# Patient Record
Sex: FEMALE | Race: BLACK OR AFRICAN AMERICAN | NOT HISPANIC OR LATINO | Employment: OTHER | URBAN - METROPOLITAN AREA
[De-identification: names, ages, dates, MRNs, and addresses within clinical notes are randomized per-mention and may not be internally consistent; named-entity substitution may affect disease eponyms.]

---

## 2023-03-06 LAB
ALBUMIN (G/DL) IN SER/PLAS: 4.3 G/DL (ref 3.4–5)
ANION GAP IN SER/PLAS: 14 MMOL/L (ref 10–20)
CALCIDIOL (25 OH VITAMIN D3) (NG/ML) IN SER/PLAS: 34 NG/ML
CALCIUM (MG/DL) IN SER/PLAS: 10.5 MG/DL (ref 8.6–10.6)
CARBON DIOXIDE, TOTAL (MMOL/L) IN SER/PLAS: 25 MMOL/L (ref 21–32)
CHLORIDE (MMOL/L) IN SER/PLAS: 103 MMOL/L (ref 98–107)
CREATININE (MG/DL) IN SER/PLAS: 0.98 MG/DL (ref 0.5–1.05)
ERYTHROCYTE DISTRIBUTION WIDTH (RATIO) BY AUTOMATED COUNT: 15 % (ref 11.5–14.5)
ERYTHROCYTE MEAN CORPUSCULAR HEMOGLOBIN CONCENTRATION (G/DL) BY AUTOMATED: 30.3 G/DL (ref 32–36)
ERYTHROCYTE MEAN CORPUSCULAR VOLUME (FL) BY AUTOMATED COUNT: 94 FL (ref 80–100)
ERYTHROCYTES (10*6/UL) IN BLOOD BY AUTOMATED COUNT: 5 X10E12/L (ref 4–5.2)
GFR FEMALE: 62 ML/MIN/1.73M2
GLUCOSE (MG/DL) IN SER/PLAS: 136 MG/DL (ref 74–99)
HEMATOCRIT (%) IN BLOOD BY AUTOMATED COUNT: 47.2 % (ref 36–46)
HEMOGLOBIN (G/DL) IN BLOOD: 14.3 G/DL (ref 12–16)
LEUKOCYTES (10*3/UL) IN BLOOD BY AUTOMATED COUNT: 6.3 X10E9/L (ref 4.4–11.3)
MAGNESIUM (MG/DL) IN SER/PLAS: 1.9 MG/DL (ref 1.6–2.4)
NRBC (PER 100 WBCS) BY AUTOMATED COUNT: 0 /100 WBC (ref 0–0)
PARATHYRIN INTACT (PG/ML) IN SER/PLAS: 225.7 PG/ML (ref 18.5–88)
PHOSPHATE (MG/DL) IN SER/PLAS: 3.4 MG/DL (ref 2.5–4.9)
PLATELETS (10*3/UL) IN BLOOD AUTOMATED COUNT: 234 X10E9/L (ref 150–450)
POTASSIUM (MMOL/L) IN SER/PLAS: 5 MMOL/L (ref 3.5–5.3)
SODIUM (MMOL/L) IN SER/PLAS: 137 MMOL/L (ref 136–145)
TACROLIMUS (NG/ML) IN BLOOD: 7.1 NG/ML (ref 2–15)
UREA NITROGEN (MG/DL) IN SER/PLAS: 20 MG/DL (ref 6–23)

## 2023-03-07 LAB
CYTOMEGALOVIRUS DNA, PCR COMMENT: NORMAL
CYTOMEGALOVIRUS DNA, PCR IU/ML: NOT DETECTED IU/ML
CYTOMEGALOVIRUS DNA, PCR LOG IU/ML: NORMAL LOG IU/ML
EBV PCR PLASMA LOG IU/ML: NORMAL LOG IU/ML
EBV PCR, QUANT, PLASMA: NOT DETECTED IU/ML

## 2023-03-09 LAB
ALBUMIN (G/DL) IN SER/PLAS: 4.2 G/DL (ref 3.4–5)
ANION GAP IN SER/PLAS: 9 MMOL/L (ref 10–20)
CALCIUM (MG/DL) IN SER/PLAS: 9.4 MG/DL (ref 8.6–10.3)
CARBON DIOXIDE, TOTAL (MMOL/L) IN SER/PLAS: 26 MMOL/L (ref 21–32)
CHLORIDE (MMOL/L) IN SER/PLAS: 103 MMOL/L (ref 98–107)
CREATININE (MG/DL) IN SER/PLAS: 0.95 MG/DL (ref 0.5–1.05)
GFR FEMALE: 64 ML/MIN/1.73M2
GLUCOSE (MG/DL) IN SER/PLAS: 166 MG/DL (ref 74–99)
MAGNESIUM (MG/DL) IN SER/PLAS: 1.8 MG/DL (ref 1.6–2.4)
PHOSPHATE (MG/DL) IN SER/PLAS: 2.8 MG/DL (ref 2.5–4.9)
POTASSIUM (MMOL/L) IN SER/PLAS: 4.4 MMOL/L (ref 3.5–5.3)
SODIUM (MMOL/L) IN SER/PLAS: 134 MMOL/L (ref 136–145)
TACROLIMUS (NG/ML) IN BLOOD: 15.6 NG/ML (ref 2–15)
UREA NITROGEN (MG/DL) IN SER/PLAS: 15 MG/DL (ref 6–23)

## 2023-03-20 LAB
ALBUMIN (G/DL) IN SER/PLAS: 4 G/DL (ref 3.4–5)
ANION GAP IN SER/PLAS: 10 MMOL/L (ref 10–20)
CALCIUM (MG/DL) IN SER/PLAS: 9.5 MG/DL (ref 8.6–10.3)
CARBON DIOXIDE, TOTAL (MMOL/L) IN SER/PLAS: 27 MMOL/L (ref 21–32)
CHLORIDE (MMOL/L) IN SER/PLAS: 102 MMOL/L (ref 98–107)
CREATININE (MG/DL) IN SER/PLAS: 0.83 MG/DL (ref 0.5–1.05)
ERYTHROCYTE DISTRIBUTION WIDTH (RATIO) BY AUTOMATED COUNT: 14.8 % (ref 11.5–14.5)
ERYTHROCYTE MEAN CORPUSCULAR HEMOGLOBIN CONCENTRATION (G/DL) BY AUTOMATED: 30.5 G/DL (ref 32–36)
ERYTHROCYTE MEAN CORPUSCULAR VOLUME (FL) BY AUTOMATED COUNT: 95 FL (ref 80–100)
ERYTHROCYTES (10*6/UL) IN BLOOD BY AUTOMATED COUNT: 4.64 X10E12/L (ref 4–5.2)
GFR FEMALE: 75 ML/MIN/1.73M2
GLUCOSE (MG/DL) IN SER/PLAS: 191 MG/DL (ref 74–99)
HEMATOCRIT (%) IN BLOOD BY AUTOMATED COUNT: 44.3 % (ref 36–46)
HEMOGLOBIN (G/DL) IN BLOOD: 13.5 G/DL (ref 12–16)
LEUKOCYTES (10*3/UL) IN BLOOD BY AUTOMATED COUNT: 5.5 X10E9/L (ref 4.4–11.3)
MAGNESIUM (MG/DL) IN SER/PLAS: 1.6 MG/DL (ref 1.6–2.4)
PHOSPHATE (MG/DL) IN SER/PLAS: 1.9 MG/DL (ref 2.5–4.9)
PLATELETS (10*3/UL) IN BLOOD AUTOMATED COUNT: 166 X10E9/L (ref 150–450)
POTASSIUM (MMOL/L) IN SER/PLAS: 4.2 MMOL/L (ref 3.5–5.3)
SODIUM (MMOL/L) IN SER/PLAS: 135 MMOL/L (ref 136–145)
TACROLIMUS (NG/ML) IN BLOOD: 6.4 NG/ML (ref 2–15)
UREA NITROGEN (MG/DL) IN SER/PLAS: 14 MG/DL (ref 6–23)

## 2023-03-21 LAB
BK VIRUS LOG PCR: NORMAL LOG IU/ML
BK VIRUS PCR QUANT: NOT DETECTED IU/ML
CYTOMEGALOVIRUS DNA, PCR COMMENT: ABNORMAL
CYTOMEGALOVIRUS DNA, PCR IU/ML: ABNORMAL IU/ML
CYTOMEGALOVIRUS DNA, PCR LOG IU/ML: ABNORMAL LOG IU/ML
EBV PCR PLASMA LOG IU/ML: NORMAL LOG IU/ML
EBV PCR, QUANT, PLASMA: NOT DETECTED IU/ML

## 2023-03-23 DIAGNOSIS — I70.8 ATHEROSCLEROSIS OF AORTIC BIFURCATION AND COMMON ILIAC ARTERIES (CMS-HCC): Primary | ICD-10-CM

## 2023-03-23 DIAGNOSIS — I70.0 ATHEROSCLEROSIS OF AORTIC BIFURCATION AND COMMON ILIAC ARTERIES (CMS-HCC): Primary | ICD-10-CM

## 2023-03-23 RX ORDER — AMLODIPINE BESYLATE 10 MG/1
1 TABLET ORAL DAILY
COMMUNITY
Start: 2022-08-17 | End: 2023-11-07 | Stop reason: SDUPTHER

## 2023-03-23 RX ORDER — LOVASTATIN 40 MG/1
40 TABLET ORAL NIGHTLY
COMMUNITY
End: 2023-03-23 | Stop reason: SDUPTHER

## 2023-03-23 RX ORDER — LOVASTATIN 40 MG/1
40 TABLET ORAL NIGHTLY
Qty: 90 TABLET | Refills: 3 | Status: SHIPPED | OUTPATIENT
Start: 2023-03-23 | End: 2023-04-03 | Stop reason: SDUPTHER

## 2023-03-23 NOTE — TELEPHONE ENCOUNTER
Patient called the refill line requesting the pended med. She was last seen in clinic 03/01/23 and has a scheduled appt 04/03/23

## 2023-04-02 PROBLEM — M25.561 BILATERAL ANTERIOR KNEE PAIN: Status: ACTIVE | Noted: 2023-04-02

## 2023-04-02 PROBLEM — E11.69 COMBINED HYPERLIPIDEMIA ASSOCIATED WITH TYPE 2 DIABETES MELLITUS (MULTI): Status: ACTIVE | Noted: 2023-04-02

## 2023-04-02 PROBLEM — D64.9 ANEMIA: Status: ACTIVE | Noted: 2023-04-02

## 2023-04-02 PROBLEM — M25.562 PAIN IN BOTH KNEES: Status: ACTIVE | Noted: 2023-04-02

## 2023-04-02 PROBLEM — H57.89 SCLERAL INJECTION: Status: ACTIVE | Noted: 2023-04-02

## 2023-04-02 PROBLEM — E78.2 COMBINED HYPERLIPIDEMIA ASSOCIATED WITH TYPE 2 DIABETES MELLITUS (MULTI): Status: ACTIVE | Noted: 2023-04-02

## 2023-04-02 PROBLEM — E79.0 HYPERURICEMIA: Status: ACTIVE | Noted: 2023-04-02

## 2023-04-02 PROBLEM — S82.832D CLOSED FRACTURE OF DISTAL END OF LEFT FIBULA WITH ROUTINE HEALING: Status: ACTIVE | Noted: 2023-04-02

## 2023-04-02 PROBLEM — I70.1 RENAL ARTERY STENOSIS (CMS-HCC): Status: ACTIVE | Noted: 2023-04-02

## 2023-04-02 PROBLEM — R60.0 EDEMA OF LEFT LOWER EXTREMITY: Status: ACTIVE | Noted: 2023-04-02

## 2023-04-02 PROBLEM — H02.883 MEIBOMIAN GLAND DYSFUNCTION (MGD) OF BOTH EYES: Status: ACTIVE | Noted: 2023-04-02

## 2023-04-02 PROBLEM — H35.82: Status: ACTIVE | Noted: 2023-04-02

## 2023-04-02 PROBLEM — N25.0 RENAL OSTEODYSTROPHY: Status: ACTIVE | Noted: 2023-04-02

## 2023-04-02 PROBLEM — H52.7 REFRACTION ERROR: Status: ACTIVE | Noted: 2023-04-02

## 2023-04-02 PROBLEM — I13.11: Status: ACTIVE | Noted: 2023-04-02

## 2023-04-02 PROBLEM — N18.5 CKD (CHRONIC KIDNEY DISEASE), STAGE V (MULTI): Status: ACTIVE | Noted: 2023-04-02

## 2023-04-02 PROBLEM — G89.18 POST-OPERATIVE PAIN: Status: ACTIVE | Noted: 2023-04-02

## 2023-04-02 PROBLEM — R09.89 BILATERAL CAROTID BRUITS: Status: ACTIVE | Noted: 2023-04-02

## 2023-04-02 PROBLEM — E11.3299 DIABETIC RETINOPATHY, NONPROLIFERATIVE, MILD (MULTI): Status: ACTIVE | Noted: 2023-04-02

## 2023-04-02 PROBLEM — I73.9: Status: ACTIVE | Noted: 2023-04-02

## 2023-04-02 PROBLEM — H52.00 HYPEROPIA: Status: ACTIVE | Noted: 2023-04-02

## 2023-04-02 PROBLEM — N18.6: Status: ACTIVE | Noted: 2023-04-02

## 2023-04-02 PROBLEM — M25.561 PAIN IN BOTH KNEES: Status: ACTIVE | Noted: 2023-04-02

## 2023-04-02 PROBLEM — H52.4 MYOPIA WITH PRESBYOPIA: Status: ACTIVE | Noted: 2023-04-02

## 2023-04-02 PROBLEM — M25.571 RIGHT ANKLE PAIN: Status: ACTIVE | Noted: 2023-04-02

## 2023-04-02 PROBLEM — M25.562 LEFT KNEE PAIN: Status: ACTIVE | Noted: 2023-04-02

## 2023-04-02 PROBLEM — I10 BENIGN HYPERTENSION: Status: ACTIVE | Noted: 2023-04-02

## 2023-04-02 PROBLEM — T82.9XXA COMPLICATION OF ARTERIOVENOUS DIALYSIS FISTULA: Status: ACTIVE | Noted: 2023-04-02

## 2023-04-02 PROBLEM — H25.813 COMBINED FORM OF AGE-RELATED CATARACT, BOTH EYES: Status: ACTIVE | Noted: 2023-04-02

## 2023-04-02 PROBLEM — E87.5 HYPERKALEMIA: Status: ACTIVE | Noted: 2023-04-02

## 2023-04-02 PROBLEM — D12.6 ADENOMATOUS POLYP OF COLON: Status: ACTIVE | Noted: 2023-04-02

## 2023-04-02 PROBLEM — N25.81 HYPERPARATHYROIDISM, SECONDARY (MULTI): Status: ACTIVE | Noted: 2023-04-02

## 2023-04-02 PROBLEM — S82.831A CLOSED FRACTURE OF DISTAL END OF RIGHT FIBULA, INITIAL ENCOUNTER: Status: ACTIVE | Noted: 2023-04-02

## 2023-04-02 PROBLEM — K21.9 GASTROESOPHAGEAL REFLUX DISEASE: Status: ACTIVE | Noted: 2023-04-02

## 2023-04-02 PROBLEM — R68.89 COLD INTOLERANCE: Status: ACTIVE | Noted: 2023-04-02

## 2023-04-02 PROBLEM — H52.10 MYOPIA WITH PRESBYOPIA: Status: ACTIVE | Noted: 2023-04-02

## 2023-04-02 PROBLEM — R73.9 STEROID-INDUCED HYPERGLYCEMIA: Status: ACTIVE | Noted: 2023-04-02

## 2023-04-02 PROBLEM — Z04.9 CONDITION NOT FOUND: Status: ACTIVE | Noted: 2023-04-02

## 2023-04-02 PROBLEM — E55.9 VITAMIN D INSUFFICIENCY: Status: ACTIVE | Noted: 2023-04-02

## 2023-04-02 PROBLEM — E87.70 HYPERVOLEMIA: Status: ACTIVE | Noted: 2023-04-02

## 2023-04-02 PROBLEM — L85.3 DRY SKIN: Status: ACTIVE | Noted: 2023-04-02

## 2023-04-02 PROBLEM — R80.9 PROTEINURIA: Status: ACTIVE | Noted: 2023-04-02

## 2023-04-02 PROBLEM — B25.9 CMV (CYTOMEGALOVIRUS) (MULTI): Status: ACTIVE | Noted: 2023-04-02

## 2023-04-02 PROBLEM — E78.5 HLD (HYPERLIPIDEMIA): Status: ACTIVE | Noted: 2023-04-02

## 2023-04-02 PROBLEM — E11.3293: Status: ACTIVE | Noted: 2023-04-02

## 2023-04-02 PROBLEM — M25.551 BILATERAL HIP PAIN: Status: ACTIVE | Noted: 2023-04-02

## 2023-04-02 PROBLEM — D84.9 IMMUNOSUPPRESSION (MULTI): Status: ACTIVE | Noted: 2023-04-02

## 2023-04-02 PROBLEM — H04.123 DRY EYE SYNDROME OF BOTH EYES: Status: ACTIVE | Noted: 2023-04-02

## 2023-04-02 PROBLEM — D12.6 TUBULAR ADENOMA OF COLON: Status: ACTIVE | Noted: 2023-04-02

## 2023-04-02 PROBLEM — R92.8 ABNORMAL MAMMOGRAM: Status: ACTIVE | Noted: 2023-04-02

## 2023-04-02 PROBLEM — M17.10 ARTHRITIS OF KNEE: Status: ACTIVE | Noted: 2023-04-02

## 2023-04-02 PROBLEM — R60.0 EDEMA OF RIGHT LOWER EXTREMITY: Status: ACTIVE | Noted: 2023-04-02

## 2023-04-02 PROBLEM — R04.0 EPISTAXIS: Status: ACTIVE | Noted: 2023-04-02

## 2023-04-02 PROBLEM — M17.0 OSTEOARTHRITIS OF PATELLOFEMORAL JOINTS OF BOTH KNEES: Status: ACTIVE | Noted: 2023-04-02

## 2023-04-02 PROBLEM — D72.810 ACQUIRED LYMPHOPENIA: Status: ACTIVE | Noted: 2023-04-02

## 2023-04-02 PROBLEM — M10.9 GOUT: Status: ACTIVE | Noted: 2023-04-02

## 2023-04-02 PROBLEM — H02.886 MEIBOMIAN GLAND DYSFUNCTION (MGD) OF BOTH EYES: Status: ACTIVE | Noted: 2023-04-02

## 2023-04-02 PROBLEM — H52.209 ASTIGMATISM: Status: ACTIVE | Noted: 2023-04-02

## 2023-04-02 PROBLEM — E11.319 DIABETIC RETINOPATHY (MULTI): Status: ACTIVE | Noted: 2023-04-02

## 2023-04-02 PROBLEM — H10.023 PINK EYE DISEASE OF BOTH EYES: Status: ACTIVE | Noted: 2023-04-02

## 2023-04-02 PROBLEM — T38.0X5A STEROID-INDUCED HYPERGLYCEMIA: Status: ACTIVE | Noted: 2023-04-02

## 2023-04-02 PROBLEM — R23.3 EASY BRUISING: Status: ACTIVE | Noted: 2023-04-02

## 2023-04-02 PROBLEM — I77.0 AVF (ARTERIOVENOUS FISTULA) (CMS-HCC): Status: ACTIVE | Noted: 2023-04-02

## 2023-04-02 PROBLEM — I70.90 CALCIFICATION OF ARTERY: Status: ACTIVE | Noted: 2023-04-02

## 2023-04-02 PROBLEM — Z94.0 KIDNEY REPLACED BY TRANSPLANT (HHS-HCC): Status: ACTIVE | Noted: 2023-04-02

## 2023-04-02 PROBLEM — D72.819 LEUKOPENIA: Status: ACTIVE | Noted: 2023-04-02

## 2023-04-02 PROBLEM — M25.552 BILATERAL HIP PAIN: Status: ACTIVE | Noted: 2023-04-02

## 2023-04-02 PROBLEM — M25.562 BILATERAL ANTERIOR KNEE PAIN: Status: ACTIVE | Noted: 2023-04-02

## 2023-04-02 RX ORDER — CLINDAMYCIN PHOSPHATE 10 UG/ML
1 LOTION TOPICAL 2 TIMES DAILY
COMMUNITY
Start: 2023-03-23 | End: 2024-05-05 | Stop reason: WASHOUT

## 2023-04-02 RX ORDER — LANCETS
EACH MISCELLANEOUS
COMMUNITY
End: 2024-05-05 | Stop reason: WASHOUT

## 2023-04-02 RX ORDER — TENOFOVIR ALAFENAMIDE 25 MG/1
1 TABLET ORAL DAILY
COMMUNITY
Start: 2022-08-15 | End: 2023-11-21 | Stop reason: ALTCHOICE

## 2023-04-02 RX ORDER — HYDRALAZINE HYDROCHLORIDE 100 MG/1
1 TABLET, FILM COATED ORAL 3 TIMES DAILY
COMMUNITY
Start: 2023-03-20 | End: 2024-01-16 | Stop reason: SDUPTHER

## 2023-04-02 RX ORDER — ACETAMINOPHEN 500 MG
1 TABLET ORAL DAILY
COMMUNITY
Start: 2016-11-30 | End: 2024-05-23 | Stop reason: WASHOUT

## 2023-04-02 RX ORDER — DAPAGLIFLOZIN 10 MG/1
1 TABLET, FILM COATED ORAL DAILY
COMMUNITY
Start: 2023-03-01 | End: 2023-10-29 | Stop reason: SDUPTHER

## 2023-04-02 RX ORDER — MYCOPHENOLATE MOFETIL 250 MG/1
2 CAPSULE ORAL 2 TIMES DAILY
COMMUNITY
Start: 2022-08-15 | End: 2023-11-21 | Stop reason: WASHOUT

## 2023-04-02 RX ORDER — ASPIRIN 325 MG
1 TABLET, DELAYED RELEASE (ENTERIC COATED) ORAL EVERY MORNING
COMMUNITY
End: 2023-10-16 | Stop reason: WASHOUT

## 2023-04-02 RX ORDER — INSULIN GLARGINE 100 [IU]/ML
20 INJECTION, SOLUTION SUBCUTANEOUS EVERY MORNING
COMMUNITY
Start: 2022-08-17 | End: 2023-11-21 | Stop reason: WASHOUT

## 2023-04-02 RX ORDER — KETOCONAZOLE 20 MG/ML
SHAMPOO, SUSPENSION TOPICAL
COMMUNITY
Start: 2023-03-19 | End: 2024-05-05 | Stop reason: WASHOUT

## 2023-04-02 RX ORDER — PREDNISONE 5 MG/1
1 TABLET ORAL DAILY
COMMUNITY
Start: 2022-08-15 | End: 2023-10-16 | Stop reason: WASHOUT

## 2023-04-02 RX ORDER — BLOOD SUGAR DIAGNOSTIC
STRIP MISCELLANEOUS 4 TIMES DAILY
COMMUNITY
End: 2024-05-05 | Stop reason: WASHOUT

## 2023-04-02 RX ORDER — SEMAGLUTIDE 1.34 MG/ML
1 INJECTION, SOLUTION SUBCUTANEOUS
COMMUNITY
Start: 2023-01-24 | End: 2023-10-16 | Stop reason: WASHOUT

## 2023-04-02 RX ORDER — LANOLIN ALCOHOL/MO/W.PET/CERES
400 CREAM (GRAM) TOPICAL DAILY
COMMUNITY
Start: 2022-08-22 | End: 2024-01-16 | Stop reason: SDUPTHER

## 2023-04-02 RX ORDER — TACROLIMUS 0.5 MG/1
1 CAPSULE ORAL 2 TIMES DAILY
COMMUNITY
Start: 2023-03-20 | End: 2023-10-16 | Stop reason: WASHOUT

## 2023-04-02 RX ORDER — TACROLIMUS 1 MG/1
1 CAPSULE ORAL EVERY 12 HOURS
COMMUNITY
Start: 2022-08-15 | End: 2023-11-21 | Stop reason: SDUPTHER

## 2023-04-02 RX ORDER — LANCETS 33 GAUGE
EACH MISCELLANEOUS
COMMUNITY
Start: 2022-11-18 | End: 2024-05-05 | Stop reason: WASHOUT

## 2023-04-02 RX ORDER — DIBASIC SODIUM PHOSPHATE, MONOBASIC POTASSIUM PHOSPHATE AND MONOBASIC SODIUM PHOSPHATE 852; 155; 130 MG/1; MG/1; MG/1
1 TABLET ORAL EVERY 12 HOURS
COMMUNITY
Start: 2023-02-26 | End: 2023-10-16 | Stop reason: WASHOUT

## 2023-04-02 RX ORDER — INSULIN LISPRO 100 [IU]/ML
8 INJECTION, SOLUTION INTRAVENOUS; SUBCUTANEOUS
COMMUNITY
Start: 2022-08-25 | End: 2023-10-11 | Stop reason: SDUPTHER

## 2023-04-02 RX ORDER — CARVEDILOL 25 MG/1
2 TABLET ORAL 2 TIMES DAILY
COMMUNITY
Start: 2019-05-24 | End: 2024-03-18 | Stop reason: SDUPTHER

## 2023-04-03 ENCOUNTER — APPOINTMENT (OUTPATIENT)
Dept: LAB | Facility: LAB | Age: 73
End: 2023-04-03
Payer: COMMERCIAL

## 2023-04-03 ENCOUNTER — OFFICE VISIT (OUTPATIENT)
Dept: PRIMARY CARE | Facility: CLINIC | Age: 73
End: 2023-04-03
Payer: COMMERCIAL

## 2023-04-03 VITALS
WEIGHT: 218 LBS | SYSTOLIC BLOOD PRESSURE: 173 MMHG | HEART RATE: 93 BPM | DIASTOLIC BLOOD PRESSURE: 83 MMHG | TEMPERATURE: 98.1 F | BODY MASS INDEX: 36.32 KG/M2 | OXYGEN SATURATION: 99 % | HEIGHT: 65 IN

## 2023-04-03 DIAGNOSIS — I70.0 ATHEROSCLEROSIS OF AORTIC BIFURCATION AND COMMON ILIAC ARTERIES (CMS-HCC): ICD-10-CM

## 2023-04-03 DIAGNOSIS — I70.8 ATHEROSCLEROSIS OF AORTIC BIFURCATION AND COMMON ILIAC ARTERIES (CMS-HCC): ICD-10-CM

## 2023-04-03 LAB
ALBUMIN (G/DL) IN SER/PLAS: 4.3 G/DL (ref 3.4–5)
ANION GAP IN SER/PLAS: 13 MMOL/L (ref 10–20)
CALCIUM (MG/DL) IN SER/PLAS: 10.4 MG/DL (ref 8.6–10.6)
CARBON DIOXIDE, TOTAL (MMOL/L) IN SER/PLAS: 25 MMOL/L (ref 21–32)
CHLORIDE (MMOL/L) IN SER/PLAS: 104 MMOL/L (ref 98–107)
CREATININE (MG/DL) IN SER/PLAS: 1.11 MG/DL (ref 0.5–1.05)
ERYTHROCYTE DISTRIBUTION WIDTH (RATIO) BY AUTOMATED COUNT: 14.9 % (ref 11.5–14.5)
ERYTHROCYTE MEAN CORPUSCULAR HEMOGLOBIN CONCENTRATION (G/DL) BY AUTOMATED: 30.7 G/DL (ref 32–36)
ERYTHROCYTE MEAN CORPUSCULAR VOLUME (FL) BY AUTOMATED COUNT: 96 FL (ref 80–100)
ERYTHROCYTES (10*6/UL) IN BLOOD BY AUTOMATED COUNT: 4.72 X10E12/L (ref 4–5.2)
GFR FEMALE: 53 ML/MIN/1.73M2
GLUCOSE (MG/DL) IN SER/PLAS: 150 MG/DL (ref 74–99)
HEMATOCRIT (%) IN BLOOD BY AUTOMATED COUNT: 45.3 % (ref 36–46)
HEMOGLOBIN (G/DL) IN BLOOD: 13.9 G/DL (ref 12–16)
LEUKOCYTES (10*3/UL) IN BLOOD BY AUTOMATED COUNT: 4.3 X10E9/L (ref 4.4–11.3)
MAGNESIUM (MG/DL) IN SER/PLAS: 2.12 MG/DL (ref 1.6–2.4)
NRBC (PER 100 WBCS) BY AUTOMATED COUNT: 0 /100 WBC (ref 0–0)
PHOSPHATE (MG/DL) IN SER/PLAS: 2.9 MG/DL (ref 2.5–4.9)
PLATELETS (10*3/UL) IN BLOOD AUTOMATED COUNT: 208 X10E9/L (ref 150–450)
POTASSIUM (MMOL/L) IN SER/PLAS: 4.9 MMOL/L (ref 3.5–5.3)
SODIUM (MMOL/L) IN SER/PLAS: 137 MMOL/L (ref 136–145)
TACROLIMUS (NG/ML) IN BLOOD: 6 NG/ML (ref 2–15)
UREA NITROGEN (MG/DL) IN SER/PLAS: 17 MG/DL (ref 6–23)

## 2023-04-03 PROCEDURE — 3079F DIAST BP 80-89 MM HG: CPT | Performed by: STUDENT IN AN ORGANIZED HEALTH CARE EDUCATION/TRAINING PROGRAM

## 2023-04-03 PROCEDURE — 99213 OFFICE O/P EST LOW 20 MIN: CPT | Performed by: STUDENT IN AN ORGANIZED HEALTH CARE EDUCATION/TRAINING PROGRAM

## 2023-04-03 PROCEDURE — 3077F SYST BP >= 140 MM HG: CPT | Performed by: STUDENT IN AN ORGANIZED HEALTH CARE EDUCATION/TRAINING PROGRAM

## 2023-04-03 PROCEDURE — 3008F BODY MASS INDEX DOCD: CPT | Performed by: STUDENT IN AN ORGANIZED HEALTH CARE EDUCATION/TRAINING PROGRAM

## 2023-04-03 PROCEDURE — 1159F MED LIST DOCD IN RCRD: CPT | Performed by: STUDENT IN AN ORGANIZED HEALTH CARE EDUCATION/TRAINING PROGRAM

## 2023-04-03 PROCEDURE — 1036F TOBACCO NON-USER: CPT | Performed by: STUDENT IN AN ORGANIZED HEALTH CARE EDUCATION/TRAINING PROGRAM

## 2023-04-03 RX ORDER — LOVASTATIN 40 MG/1
40 TABLET ORAL NIGHTLY
Qty: 90 TABLET | Refills: 3 | Status: SHIPPED | OUTPATIENT
Start: 2023-04-03 | End: 2024-04-18 | Stop reason: SDUPTHER

## 2023-04-03 ASSESSMENT — PAIN SCALES - GENERAL: PAINLEVEL: 10-WORST PAIN EVER

## 2023-04-03 NOTE — PROGRESS NOTES
"Subjective   Jacklyn Villa is a 72 y.o. female with extensive medical history including s/p kidney transplant in 2022 due to longstanding cHTN and T2DM, who presents for complaint of Spasms and Med Refill.     HPI   Patient reporting muscle spasms of severe pain that occur at the sternal region and upper back. Patient presenting today because she reports her ophthalmologist told her she should see her family medicine doctor about the episodes. She reports these episodes first started during pregnancies, but would discontinue after pregnancy, but since her last pregnancy they have continued. The frequency of these episodes were previously monthly, but recently are about three times per week. She reports they appear to occur when she is stressed, upset, or nervous - denies association to activity. She reports significant elevation of stress level for the past month due to living in the same household as her son and son's girlfriend. Patient reports the girlfriend is nice to her but treats her son poorly, and this causes her a lot of stress. Patient denies the episodes being related to exertion or dehydration or meals. She reports that oral intake of liquids resolves the pain instantly. She was Rx APAP w/ codeine in the past, but denies this provided any relief. She endorses some SOB with exertion, denies orthopnea. Her last episode was this morning in the lobby that resolved by drinking water. She reports having GERD in the past that worsened with spicy foods, but thinks this sensation is different.     Review of Systems as per above.    Objective   /83 (BP Location: Right arm, Patient Position: Sitting, BP Cuff Size: Large adult)   Pulse 93   Temp 36.7 °C (98.1 °F) (Temporal)   Ht 1.651 m (5' 5\")   Wt 98.9 kg (218 lb)   SpO2 99%   BMI 36.28 kg/m²     Patient reports not taking her morning doses of medications due to lab draw this morning.    Physical Exam  CONSTITUTIONAL/GENERAL:  Well appearing. No " "acute distress. Sitting comfortably in chair.   RESPIRATION:  Breathing comfortably on room air. No hoarseness, wheezing, stridor, or other abnormality. Ausculatation of lungs clear bilaterally without crackles or rhonchi.  CARDIOVASCULAR:  Regular rate and rhythm without extra heart sounds or murmurs. No clubbing/cyanosis/edema in hands or feet.   NEURO/PSYCH:  Awake and alert. Appropriate mood and affect. Upset when discussing home living situation.      Assessment/Plan   Jacklyn Villa is a 72 y.o. female with a PMH of ESRD secondary to diabetes s/p kidney transplant (8/19/22), CMV, T2DM, HTN, who presented for complaint of muscle spasms and Med Refill.     #Muscle Spasms  - Unlikely acute coronary source due to occurrence at rest, instant relief with oral intake, recent echocardiogram without significant findings, and longstanding symptoms over many years.  - Likely GERD/esophagitis related symptoms, matching reported location of pain of midsternum and occasionally midline of upper back, relief with oral intake, and complaint of original onset of symptoms during pregnancies.   - Possible anxiety/psych etiology given increase in occurrence with recent stress.  - Patient does not want medication or social support at this time for symptoms, \"water works just fine\". Printed social support resources for patient to take home and encouraged continued hydration.     #HTN  Well controlled on current regimen  -C/w amlodipine 10mg daily, carvedilol two tablets twice daily, hydralazine 100 mg TID     #HLD  - refilled Lovastatin 40mg at bedtime     NV: f/up with  in 3 months (Patient requesting to be seen by )    Be Ku, Medical Student  Seen and discussed with resident Dr. Delphine Molina     I saw and evaluated the patient. I personally obtained the key and critical portions of the history and physical exam. I reviewed and modified the student's documentation of the HPI and discussed the patient " with the medical student. I agree with the above documentation of the HPI.    Patient discussed with attending, Dr. Marcus Molina MD  Family Medicine, PGY-2

## 2023-04-04 LAB
CYTOMEGALOVIRUS DNA, PCR COMMENT: ABNORMAL
CYTOMEGALOVIRUS DNA, PCR IU/ML: ABNORMAL IU/ML
CYTOMEGALOVIRUS DNA, PCR LOG IU/ML: ABNORMAL LOG IU/ML

## 2023-04-05 LAB
CREATININE (MG/DL) IN URINE: 114 MG/DL (ref 20–320)
PROTEIN (MG/DL) IN URINE: 13 MG/DL (ref 5–24)
PROTEIN/CREATININE (MG/MG) IN URINE: 0.11 MG/MG CREAT (ref 0–0.17)

## 2023-04-06 NOTE — PROGRESS NOTES
I reviewed the resident/fellow's documentation and discussed the patient with the resident/fellow. I agree with the resident/fellow's medical decision making as documented in the note.     Farzana Velazquez MD

## 2023-04-10 LAB
ALBUMIN (G/DL) IN SER/PLAS: 4.1 G/DL (ref 3.4–5)
ANION GAP IN SER/PLAS: 12 MMOL/L (ref 10–20)
CALCIUM (MG/DL) IN SER/PLAS: 9.6 MG/DL (ref 8.6–10.3)
CARBON DIOXIDE, TOTAL (MMOL/L) IN SER/PLAS: 25 MMOL/L (ref 21–32)
CHLORIDE (MMOL/L) IN SER/PLAS: 103 MMOL/L (ref 98–107)
CREATININE (MG/DL) IN SER/PLAS: 0.98 MG/DL (ref 0.5–1.05)
ERYTHROCYTE DISTRIBUTION WIDTH (RATIO) BY AUTOMATED COUNT: 14.9 % (ref 11.5–14.5)
ERYTHROCYTE MEAN CORPUSCULAR HEMOGLOBIN CONCENTRATION (G/DL) BY AUTOMATED: 30.3 G/DL (ref 32–36)
ERYTHROCYTE MEAN CORPUSCULAR VOLUME (FL) BY AUTOMATED COUNT: 94 FL (ref 80–100)
ERYTHROCYTES (10*6/UL) IN BLOOD BY AUTOMATED COUNT: 4.85 X10E12/L (ref 4–5.2)
GFR FEMALE: 62 ML/MIN/1.73M2
GLUCOSE (MG/DL) IN SER/PLAS: 178 MG/DL (ref 74–99)
HEMATOCRIT (%) IN BLOOD BY AUTOMATED COUNT: 45.8 % (ref 36–46)
HEMOGLOBIN (G/DL) IN BLOOD: 13.9 G/DL (ref 12–16)
LEUKOCYTES (10*3/UL) IN BLOOD BY AUTOMATED COUNT: 3.9 X10E9/L (ref 4.4–11.3)
MAGNESIUM (MG/DL) IN SER/PLAS: 1.5 MG/DL (ref 1.6–2.4)
PHOSPHATE (MG/DL) IN SER/PLAS: 2.5 MG/DL (ref 2.5–4.9)
PLATELETS (10*3/UL) IN BLOOD AUTOMATED COUNT: 207 X10E9/L (ref 150–450)
POTASSIUM (MMOL/L) IN SER/PLAS: 4.3 MMOL/L (ref 3.5–5.3)
SODIUM (MMOL/L) IN SER/PLAS: 136 MMOL/L (ref 136–145)
TACROLIMUS (NG/ML) IN BLOOD: 5.5 NG/ML (ref 2–15)
UREA NITROGEN (MG/DL) IN SER/PLAS: 19 MG/DL (ref 6–23)

## 2023-04-24 LAB
ALBUMIN (G/DL) IN SER/PLAS: 4.1 G/DL (ref 3.4–5)
ANION GAP IN SER/PLAS: 13 MMOL/L (ref 10–20)
CALCIUM (MG/DL) IN SER/PLAS: 9.6 MG/DL (ref 8.6–10.3)
CARBON DIOXIDE, TOTAL (MMOL/L) IN SER/PLAS: 27 MMOL/L (ref 21–32)
CHLORIDE (MMOL/L) IN SER/PLAS: 102 MMOL/L (ref 98–107)
CREATININE (MG/DL) IN SER/PLAS: 1.19 MG/DL (ref 0.5–1.05)
ERYTHROCYTE DISTRIBUTION WIDTH (RATIO) BY AUTOMATED COUNT: 14.6 % (ref 11.5–14.5)
ERYTHROCYTE MEAN CORPUSCULAR HEMOGLOBIN CONCENTRATION (G/DL) BY AUTOMATED: 31.1 G/DL (ref 32–36)
ERYTHROCYTE MEAN CORPUSCULAR VOLUME (FL) BY AUTOMATED COUNT: 93 FL (ref 80–100)
ERYTHROCYTES (10*6/UL) IN BLOOD BY AUTOMATED COUNT: 5.04 X10E12/L (ref 4–5.2)
GFR FEMALE: 49 ML/MIN/1.73M2
GLUCOSE (MG/DL) IN SER/PLAS: 180 MG/DL (ref 74–99)
HEMATOCRIT (%) IN BLOOD BY AUTOMATED COUNT: 47 % (ref 36–46)
HEMOGLOBIN (G/DL) IN BLOOD: 14.6 G/DL (ref 12–16)
HEPATITIS B VIRUS CORE AB (PRESENCE) IN SER/PLAS BY IMM: NONREACTIVE
HEPATITIS B VIRUS SURFACE AG PRESENCE IN SERUM: NONREACTIVE
LEUKOCYTES (10*3/UL) IN BLOOD BY AUTOMATED COUNT: 3.3 X10E9/L (ref 4.4–11.3)
MAGNESIUM (MG/DL) IN SER/PLAS: 1.8 MG/DL (ref 1.6–2.4)
PHOSPHATE (MG/DL) IN SER/PLAS: 3 MG/DL (ref 2.5–4.9)
PLATELETS (10*3/UL) IN BLOOD AUTOMATED COUNT: 219 X10E9/L (ref 150–450)
POTASSIUM (MMOL/L) IN SER/PLAS: 3.9 MMOL/L (ref 3.5–5.3)
SODIUM (MMOL/L) IN SER/PLAS: 138 MMOL/L (ref 136–145)
TACROLIMUS (NG/ML) IN BLOOD: 5 NG/ML (ref 2–15)
UREA NITROGEN (MG/DL) IN SER/PLAS: 20 MG/DL (ref 6–23)

## 2023-04-25 LAB
CYTOMEGALOVIRUS DNA, PCR COMMENT: ABNORMAL
CYTOMEGALOVIRUS DNA, PCR IU/ML: 188 IU/ML
CYTOMEGALOVIRUS DNA, PCR LOG IU/ML: 2.27 LOG IU/ML

## 2023-04-27 LAB
HBV DNA PCR COMMENT: NORMAL
HBV DNA QUANT PCR IU/ML: NOT DETECTED IU/ML
HBV DNA QUANT PCR LOG: NORMAL LOG IU/ML

## 2023-05-01 LAB
ALBUMIN (G/DL) IN SER/PLAS: 4 G/DL (ref 3.4–5)
ANION GAP IN SER/PLAS: 10 MMOL/L (ref 10–20)
CALCIUM (MG/DL) IN SER/PLAS: 10.2 MG/DL (ref 8.6–10.3)
CARBON DIOXIDE, TOTAL (MMOL/L) IN SER/PLAS: 25 MMOL/L (ref 21–32)
CHLORIDE (MMOL/L) IN SER/PLAS: 104 MMOL/L (ref 98–107)
CREATININE (MG/DL) IN SER/PLAS: 1 MG/DL (ref 0.5–1.05)
ERYTHROCYTE DISTRIBUTION WIDTH (RATIO) BY AUTOMATED COUNT: 14.5 % (ref 11.5–14.5)
ERYTHROCYTE MEAN CORPUSCULAR HEMOGLOBIN CONCENTRATION (G/DL) BY AUTOMATED: 31.4 G/DL (ref 32–36)
ERYTHROCYTE MEAN CORPUSCULAR VOLUME (FL) BY AUTOMATED COUNT: 92 FL (ref 80–100)
ERYTHROCYTES (10*6/UL) IN BLOOD BY AUTOMATED COUNT: 5.17 X10E12/L (ref 4–5.2)
GFR FEMALE: 60 ML/MIN/1.73M2
GLUCOSE (MG/DL) IN SER/PLAS: 198 MG/DL (ref 74–99)
HEMATOCRIT (%) IN BLOOD BY AUTOMATED COUNT: 47.8 % (ref 36–46)
HEMOGLOBIN (G/DL) IN BLOOD: 15 G/DL (ref 12–16)
LEUKOCYTES (10*3/UL) IN BLOOD BY AUTOMATED COUNT: 3.4 X10E9/L (ref 4.4–11.3)
MAGNESIUM (MG/DL) IN SER/PLAS: 1.9 MG/DL (ref 1.6–2.4)
PHOSPHATE (MG/DL) IN SER/PLAS: 2.2 MG/DL (ref 2.5–4.9)
PLATELETS (10*3/UL) IN BLOOD AUTOMATED COUNT: 226 X10E9/L (ref 150–450)
POTASSIUM (MMOL/L) IN SER/PLAS: 4.3 MMOL/L (ref 3.5–5.3)
SODIUM (MMOL/L) IN SER/PLAS: 135 MMOL/L (ref 136–145)
TACROLIMUS (NG/ML) IN BLOOD: 5.8 NG/ML (ref 2–15)
UREA NITROGEN (MG/DL) IN SER/PLAS: 17 MG/DL (ref 6–23)

## 2023-05-08 LAB
ALBUMIN (G/DL) IN SER/PLAS: 4.2 G/DL (ref 3.4–5)
ANION GAP IN SER/PLAS: 14 MMOL/L (ref 10–20)
CALCIUM (MG/DL) IN SER/PLAS: 10.2 MG/DL (ref 8.6–10.3)
CARBON DIOXIDE, TOTAL (MMOL/L) IN SER/PLAS: 24 MMOL/L (ref 21–32)
CHLORIDE (MMOL/L) IN SER/PLAS: 102 MMOL/L (ref 98–107)
CREATININE (MG/DL) IN SER/PLAS: 0.89 MG/DL (ref 0.5–1.05)
CREATININE (MG/DL) IN URINE: 13 MG/DL (ref 20–320)
ERYTHROCYTE DISTRIBUTION WIDTH (RATIO) BY AUTOMATED COUNT: 14.6 % (ref 11.5–14.5)
ERYTHROCYTE MEAN CORPUSCULAR HEMOGLOBIN CONCENTRATION (G/DL) BY AUTOMATED: 31.8 G/DL (ref 32–36)
ERYTHROCYTE MEAN CORPUSCULAR VOLUME (FL) BY AUTOMATED COUNT: 92 FL (ref 80–100)
ERYTHROCYTES (10*6/UL) IN BLOOD BY AUTOMATED COUNT: 5.07 X10E12/L (ref 4–5.2)
GFR FEMALE: 69 ML/MIN/1.73M2
GLUCOSE (MG/DL) IN SER/PLAS: 145 MG/DL (ref 74–99)
HEMATOCRIT (%) IN BLOOD BY AUTOMATED COUNT: 46.6 % (ref 36–46)
HEMOGLOBIN (G/DL) IN BLOOD: 14.8 G/DL (ref 12–16)
LEUKOCYTES (10*3/UL) IN BLOOD BY AUTOMATED COUNT: 4.4 X10E9/L (ref 4.4–11.3)
MAGNESIUM (MG/DL) IN SER/PLAS: 1.6 MG/DL (ref 1.6–2.4)
PHOSPHATE (MG/DL) IN SER/PLAS: 3.3 MG/DL (ref 2.5–4.9)
PLATELETS (10*3/UL) IN BLOOD AUTOMATED COUNT: 215 X10E9/L (ref 150–450)
POTASSIUM (MMOL/L) IN SER/PLAS: 4.2 MMOL/L (ref 3.5–5.3)
PROTEIN (MG/DL) IN URINE: 4 MG/DL (ref 5–24)
PROTEIN/CREATININE (MG/MG) IN URINE: 0.31 MG/MG CREAT (ref 0–0.17)
SODIUM (MMOL/L) IN SER/PLAS: 136 MMOL/L (ref 136–145)
TACROLIMUS (NG/ML) IN BLOOD: 6.4 NG/ML (ref 2–15)
UREA NITROGEN (MG/DL) IN SER/PLAS: 12 MG/DL (ref 6–23)

## 2023-05-15 LAB
ALBUMIN (G/DL) IN SER/PLAS: 4 G/DL (ref 3.4–5)
ANION GAP IN SER/PLAS: 14 MMOL/L (ref 10–20)
CALCIUM (MG/DL) IN SER/PLAS: 9.9 MG/DL (ref 8.6–10.3)
CARBON DIOXIDE, TOTAL (MMOL/L) IN SER/PLAS: 25 MMOL/L (ref 21–32)
CHLORIDE (MMOL/L) IN SER/PLAS: 102 MMOL/L (ref 98–107)
CREATININE (MG/DL) IN SER/PLAS: 0.99 MG/DL (ref 0.5–1.05)
ERYTHROCYTE DISTRIBUTION WIDTH (RATIO) BY AUTOMATED COUNT: 14.5 % (ref 11.5–14.5)
ERYTHROCYTE MEAN CORPUSCULAR HEMOGLOBIN CONCENTRATION (G/DL) BY AUTOMATED: 31.5 G/DL (ref 32–36)
ERYTHROCYTE MEAN CORPUSCULAR VOLUME (FL) BY AUTOMATED COUNT: 91 FL (ref 80–100)
ERYTHROCYTES (10*6/UL) IN BLOOD BY AUTOMATED COUNT: 5.22 X10E12/L (ref 4–5.2)
GFR FEMALE: 61 ML/MIN/1.73M2
GLUCOSE (MG/DL) IN SER/PLAS: 171 MG/DL (ref 74–99)
HEMATOCRIT (%) IN BLOOD BY AUTOMATED COUNT: 47.6 % (ref 36–46)
HEMOGLOBIN (G/DL) IN BLOOD: 15 G/DL (ref 12–16)
LEUKOCYTES (10*3/UL) IN BLOOD BY AUTOMATED COUNT: 4.6 X10E9/L (ref 4.4–11.3)
MAGNESIUM (MG/DL) IN SER/PLAS: 1.8 MG/DL (ref 1.6–2.4)
PHOSPHATE (MG/DL) IN SER/PLAS: 3 MG/DL (ref 2.5–4.9)
PLATELETS (10*3/UL) IN BLOOD AUTOMATED COUNT: 231 X10E9/L (ref 150–450)
POTASSIUM (MMOL/L) IN SER/PLAS: 4.4 MMOL/L (ref 3.5–5.3)
SODIUM (MMOL/L) IN SER/PLAS: 137 MMOL/L (ref 136–145)
TACROLIMUS (NG/ML) IN BLOOD: 13.4 NG/ML (ref 2–15)
UREA NITROGEN (MG/DL) IN SER/PLAS: 15 MG/DL (ref 6–23)

## 2023-05-17 LAB
CREATININE (MG/DL) IN URINE: 110 MG/DL (ref 20–320)
PROTEIN (MG/DL) IN URINE: 16 MG/DL (ref 5–24)
PROTEIN/CREATININE (MG/MG) IN URINE: 0.15 MG/MG CREAT (ref 0–0.17)

## 2023-05-22 LAB
ALBUMIN (G/DL) IN SER/PLAS: 4.2 G/DL (ref 3.4–5)
ANION GAP IN SER/PLAS: 12 MMOL/L (ref 10–20)
CALCIUM (MG/DL) IN SER/PLAS: 10.1 MG/DL (ref 8.6–10.3)
CARBON DIOXIDE, TOTAL (MMOL/L) IN SER/PLAS: 24 MMOL/L (ref 21–32)
CHLORIDE (MMOL/L) IN SER/PLAS: 101 MMOL/L (ref 98–107)
CREATININE (MG/DL) IN SER/PLAS: 0.97 MG/DL (ref 0.5–1.05)
ERYTHROCYTE DISTRIBUTION WIDTH (RATIO) BY AUTOMATED COUNT: 14.5 % (ref 11.5–14.5)
ERYTHROCYTE MEAN CORPUSCULAR HEMOGLOBIN CONCENTRATION (G/DL) BY AUTOMATED: 31 G/DL (ref 32–36)
ERYTHROCYTE MEAN CORPUSCULAR VOLUME (FL) BY AUTOMATED COUNT: 93 FL (ref 80–100)
ERYTHROCYTES (10*6/UL) IN BLOOD BY AUTOMATED COUNT: 5.14 X10E12/L (ref 4–5.2)
ESTIMATED AVERAGE GLUCOSE FOR HBA1C: 169 MG/DL
GFR FEMALE: 62 ML/MIN/1.73M2
GLUCOSE (MG/DL) IN SER/PLAS: 182 MG/DL (ref 74–99)
HEMATOCRIT (%) IN BLOOD BY AUTOMATED COUNT: 47.8 % (ref 36–46)
HEMOGLOBIN (G/DL) IN BLOOD: 14.8 G/DL (ref 12–16)
HEMOGLOBIN A1C/HEMOGLOBIN TOTAL IN BLOOD: 7.5 %
LEUKOCYTES (10*3/UL) IN BLOOD BY AUTOMATED COUNT: 3.6 X10E9/L (ref 4.4–11.3)
MAGNESIUM (MG/DL) IN SER/PLAS: 1.5 MG/DL (ref 1.6–2.4)
PHOSPHATE (MG/DL) IN SER/PLAS: 3.1 MG/DL (ref 2.5–4.9)
PLATELETS (10*3/UL) IN BLOOD AUTOMATED COUNT: 244 X10E9/L (ref 150–450)
POTASSIUM (MMOL/L) IN SER/PLAS: 4.4 MMOL/L (ref 3.5–5.3)
SODIUM (MMOL/L) IN SER/PLAS: 133 MMOL/L (ref 136–145)
TACROLIMUS (NG/ML) IN BLOOD: 7.6 NG/ML (ref 2–15)
UREA NITROGEN (MG/DL) IN SER/PLAS: 14 MG/DL (ref 6–23)

## 2023-05-23 LAB
CYTOMEGALOVIRUS DNA, PCR COMMENT: NORMAL
CYTOMEGALOVIRUS DNA, PCR IU/ML: NOT DETECTED IU/ML
CYTOMEGALOVIRUS DNA, PCR LOG IU/ML: NORMAL LOG IU/ML

## 2023-06-05 LAB
ALBUMIN (G/DL) IN SER/PLAS: 4.5 G/DL (ref 3.4–5)
ANION GAP IN SER/PLAS: 12 MMOL/L (ref 10–20)
CALCIUM (MG/DL) IN SER/PLAS: 9.9 MG/DL (ref 8.6–10.3)
CARBON DIOXIDE, TOTAL (MMOL/L) IN SER/PLAS: 25 MMOL/L (ref 21–32)
CHLORIDE (MMOL/L) IN SER/PLAS: 101 MMOL/L (ref 98–107)
CREATININE (MG/DL) IN SER/PLAS: 1.06 MG/DL (ref 0.5–1.05)
ERYTHROCYTE DISTRIBUTION WIDTH (RATIO) BY AUTOMATED COUNT: 14.5 % (ref 11.5–14.5)
ERYTHROCYTE MEAN CORPUSCULAR HEMOGLOBIN CONCENTRATION (G/DL) BY AUTOMATED: 31 G/DL (ref 32–36)
ERYTHROCYTE MEAN CORPUSCULAR VOLUME (FL) BY AUTOMATED COUNT: 96 FL (ref 80–100)
ERYTHROCYTES (10*6/UL) IN BLOOD BY AUTOMATED COUNT: 5.37 X10E12/L (ref 4–5.2)
GFR FEMALE: 56 ML/MIN/1.73M2
GLUCOSE (MG/DL) IN SER/PLAS: 144 MG/DL (ref 74–99)
HEMATOCRIT (%) IN BLOOD BY AUTOMATED COUNT: 51.3 % (ref 36–46)
HEMOGLOBIN (G/DL) IN BLOOD: 15.9 G/DL (ref 12–16)
LEUKOCYTES (10*3/UL) IN BLOOD BY AUTOMATED COUNT: 4.1 X10E9/L (ref 4.4–11.3)
MAGNESIUM (MG/DL) IN SER/PLAS: 2 MG/DL (ref 1.6–2.4)
PHOSPHATE (MG/DL) IN SER/PLAS: 2.9 MG/DL (ref 2.5–4.9)
PLATELETS (10*3/UL) IN BLOOD AUTOMATED COUNT: 255 X10E9/L (ref 150–450)
POTASSIUM (MMOL/L) IN SER/PLAS: 4.1 MMOL/L (ref 3.5–5.3)
SODIUM (MMOL/L) IN SER/PLAS: 134 MMOL/L (ref 136–145)
TACROLIMUS (NG/ML) IN BLOOD: 9.5 NG/ML (ref 2–15)
UREA NITROGEN (MG/DL) IN SER/PLAS: 21 MG/DL (ref 6–23)

## 2023-06-07 ENCOUNTER — OFFICE VISIT (OUTPATIENT)
Dept: PRIMARY CARE | Facility: CLINIC | Age: 73
End: 2023-06-07
Payer: COMMERCIAL

## 2023-06-07 VITALS
OXYGEN SATURATION: 96 % | DIASTOLIC BLOOD PRESSURE: 73 MMHG | WEIGHT: 215.6 LBS | HEART RATE: 81 BPM | HEIGHT: 65 IN | SYSTOLIC BLOOD PRESSURE: 147 MMHG | TEMPERATURE: 97.5 F | BODY MASS INDEX: 35.92 KG/M2

## 2023-06-07 DIAGNOSIS — Z09 FOLLOW UP: Primary | ICD-10-CM

## 2023-06-07 PROCEDURE — 3077F SYST BP >= 140 MM HG: CPT

## 2023-06-07 PROCEDURE — 1159F MED LIST DOCD IN RCRD: CPT

## 2023-06-07 PROCEDURE — 3078F DIAST BP <80 MM HG: CPT

## 2023-06-07 PROCEDURE — 1160F RVW MEDS BY RX/DR IN RCRD: CPT

## 2023-06-07 PROCEDURE — 1036F TOBACCO NON-USER: CPT

## 2023-06-07 PROCEDURE — 3051F HG A1C>EQUAL 7.0%<8.0%: CPT

## 2023-06-07 PROCEDURE — 3008F BODY MASS INDEX DOCD: CPT

## 2023-06-07 PROCEDURE — 99213 OFFICE O/P EST LOW 20 MIN: CPT

## 2023-06-07 ASSESSMENT — ENCOUNTER SYMPTOMS
SHORTNESS OF BREATH: 0
HEMATURIA: 0
EYE ITCHING: 0
PALPITATIONS: 0
APPETITE CHANGE: 0
FEVER: 0
NAUSEA: 0
UNEXPECTED WEIGHT CHANGE: 0
JOINT SWELLING: 0
SLEEP DISTURBANCE: 0
DYSURIA: 0
WHEEZING: 0
MYALGIAS: 0
CHILLS: 0
VOMITING: 0
RHINORRHEA: 0
FREQUENCY: 0
LIGHT-HEADEDNESS: 0
CONSTIPATION: 0
CONFUSION: 0
DIZZINESS: 0
COUGH: 0
DIARRHEA: 0
WOUND: 0
HEADACHES: 0
EYE DISCHARGE: 0
ABDOMINAL PAIN: 0

## 2023-06-07 ASSESSMENT — PAIN SCALES - GENERAL: PAINLEVEL: 0-NO PAIN

## 2023-06-07 NOTE — PROGRESS NOTES
Subjective   Patient ID: Jacklyn Villa is a 73 y.o. female who presents for Follow-up.    HPI     Ms. Jacklyn Villa has no concerns today, therefore we reviewed the following:    HTN  Patient has not been recording her BP at home because she did not have the log sheet and request a new one. She currently takes amlodipine 10 mg daily carvedilol 2 tablets twice daily and hydralazine 100 mg 3 times daily.  Denies dizziness lightheadedness.    DMT2  Patient follows with endocrinology this a.m. and reduced her glargine from 20 units down to 15 units every morning    Lifestyle Modifications  Patient reports she has not walked on her treadmill or to her sisters as discussed in last visit.  She reports she lacks motivation.  She does may get out of the house twice a week and walks at the pharmacy.  Of note her sister is out of the hospital now and at home and since that summer she is willing to try to walk to her house again.  Of note she has had decrease in lower extremity swelling since getting compression socks.    Review of Systems   Constitutional:  Negative for appetite change, chills, fever and unexpected weight change.   HENT:  Negative for congestion, ear discharge, rhinorrhea and sneezing.    Eyes:  Negative for discharge, itching and visual disturbance.   Respiratory:  Negative for cough, shortness of breath and wheezing.    Cardiovascular:  Negative for chest pain, palpitations and leg swelling.   Gastrointestinal:  Negative for abdominal pain, constipation, diarrhea, nausea and vomiting.   Endocrine: Negative for cold intolerance and heat intolerance.   Genitourinary:  Negative for dysuria, frequency, hematuria and urgency.   Musculoskeletal:  Negative for joint swelling and myalgias.   Skin:  Negative for rash and wound.   Neurological:  Negative for dizziness, light-headedness and headaches.   Psychiatric/Behavioral:  Negative for confusion, sleep disturbance and suicidal ideas.        Objective   /73  "(BP Location: Right arm, Patient Position: Sitting, BP Cuff Size: Adult long)   Pulse 81   Temp 36.4 °C (97.5 °F) (Temporal)   Ht 1.651 m (5' 5\")   Wt 97.8 kg (215 lb 9.6 oz)   SpO2 96%   BMI 35.88 kg/m²     Physical Exam  Constitutional:       Appearance: Normal appearance.   HENT:      Head: Normocephalic and atraumatic.      Nose: Nose normal.   Eyes:      Extraocular Movements: Extraocular movements intact.      Conjunctiva/sclera: Conjunctivae normal.   Cardiovascular:      Rate and Rhythm: Normal rate and regular rhythm.   Pulmonary:      Effort: Pulmonary effort is normal.      Breath sounds: Normal breath sounds.   Abdominal:      Palpations: Abdomen is soft.   Musculoskeletal:      Right lower leg: No edema.      Left lower leg: No edema.   Skin:     General: Skin is warm and dry.   Neurological:      Mental Status: She is alert.   Psychiatric:         Mood and Affect: Mood normal.         Assessment/Plan        Jacklyn Villa is a 71 YO F with a PMHx of ESRD secondary to diabetes s/p kidney transplant (8/19/22), CMV, T2DM, HTN, who presents to the clinic  for general follow up.     #Lifestyle Medicine    -Ecouraged physical activity: Establish a goal that patient will walk one mile a day on treadmill or outside to her sisters house now that the weather is nice.      #HTN  - IO recordings: 147/73  -Provided 2 sheets of documentation to log BP at home. Will bring to next visit  -C/w amlodipine 10mg daily, carvedilol two tablets twice daily, hydralazine 100 mg TID       #DMT2  -C/w close follow up with Endo and recs  -C/w  Farxiga 10mg daily today  -C/w OZEMPIC 1.0mg once a week, LANTUS (reduced to) 15 units once every morning, and lispro at meals    RTC in 3 months for HCM visit; patient due for mammogram and lipid panel    D/w Dr. Romulo Mayo, DO PGY1    "

## 2023-06-07 NOTE — PATIENT INSTRUCTIONS
It was so great to see you today Jacklyn Villa  . Today we discussed:    -Physical Activity Goal: Walk to your sisters house twice a week and walk on treadmill for 1 mile or for 30minutes for twice a week  -Schedule in 3 months for yearly Adult visit        Call [phone]  For Care if you need to schedule appointment with specialist or images.    Follow up in       You were see by:    Dr. Joann Mayo D.O.  [facility]   Phone: [phone]

## 2023-06-07 NOTE — PROGRESS NOTES
I reviewed with the resident the medical history and the resident’s findings on physical examination.  I discussed with the resident the patient’s diagnosis and concur with the treatment plan as documented in the resident note.     Felicitas Sebastian MD

## 2023-06-19 LAB
ALBUMIN (G/DL) IN SER/PLAS: 4.3 G/DL (ref 3.4–5)
ANION GAP IN SER/PLAS: 12 MMOL/L (ref 10–20)
CALCIUM (MG/DL) IN SER/PLAS: 10.1 MG/DL (ref 8.6–10.3)
CARBON DIOXIDE, TOTAL (MMOL/L) IN SER/PLAS: 26 MMOL/L (ref 21–32)
CHLORIDE (MMOL/L) IN SER/PLAS: 103 MMOL/L (ref 98–107)
CREATININE (MG/DL) IN SER/PLAS: 0.88 MG/DL (ref 0.5–1.05)
ERYTHROCYTE DISTRIBUTION WIDTH (RATIO) BY AUTOMATED COUNT: 14.7 % (ref 11.5–14.5)
ERYTHROCYTE MEAN CORPUSCULAR HEMOGLOBIN CONCENTRATION (G/DL) BY AUTOMATED: 31.7 G/DL (ref 32–36)
ERYTHROCYTE MEAN CORPUSCULAR VOLUME (FL) BY AUTOMATED COUNT: 93 FL (ref 80–100)
ERYTHROCYTES (10*6/UL) IN BLOOD BY AUTOMATED COUNT: 5.13 X10E12/L (ref 4–5.2)
GFR FEMALE: 70 ML/MIN/1.73M2
GLUCOSE (MG/DL) IN SER/PLAS: 164 MG/DL (ref 74–99)
HEMATOCRIT (%) IN BLOOD BY AUTOMATED COUNT: 47.6 % (ref 36–46)
HEMOGLOBIN (G/DL) IN BLOOD: 15.1 G/DL (ref 12–16)
LEUKOCYTES (10*3/UL) IN BLOOD BY AUTOMATED COUNT: 4.1 X10E9/L (ref 4.4–11.3)
MAGNESIUM (MG/DL) IN SER/PLAS: 1.9 MG/DL (ref 1.6–2.4)
PHOSPHATE (MG/DL) IN SER/PLAS: 2.7 MG/DL (ref 2.5–4.9)
PLATELETS (10*3/UL) IN BLOOD AUTOMATED COUNT: 218 X10E9/L (ref 150–450)
POTASSIUM (MMOL/L) IN SER/PLAS: 4 MMOL/L (ref 3.5–5.3)
SODIUM (MMOL/L) IN SER/PLAS: 137 MMOL/L (ref 136–145)
TACROLIMUS (NG/ML) IN BLOOD: 6.3 NG/ML (ref 2–15)
UREA NITROGEN (MG/DL) IN SER/PLAS: 12 MG/DL (ref 6–23)

## 2023-06-20 LAB
BK VIRUS LOG PCR: NORMAL LOG IU/ML
BK VIRUS PCR QUANT: NOT DETECTED IU/ML
CYTOMEGALOVIRUS DNA, PCR COMMENT: NORMAL
CYTOMEGALOVIRUS DNA, PCR IU/ML: NOT DETECTED IU/ML
CYTOMEGALOVIRUS DNA, PCR LOG IU/ML: NORMAL LOG IU/ML
EBV PCR PLASMA LOG IU/ML: NORMAL LOG IU/ML
EBV PCR, QUANT, PLASMA: NOT DETECTED IU/ML

## 2023-07-03 LAB
ALBUMIN (G/DL) IN SER/PLAS: 4.4 G/DL (ref 3.4–5)
ANION GAP IN SER/PLAS: 11 MMOL/L (ref 10–20)
CALCIUM (MG/DL) IN SER/PLAS: 10.4 MG/DL (ref 8.6–10.3)
CARBON DIOXIDE, TOTAL (MMOL/L) IN SER/PLAS: 26 MMOL/L (ref 21–32)
CHLORIDE (MMOL/L) IN SER/PLAS: 102 MMOL/L (ref 98–107)
CREATININE (MG/DL) IN SER/PLAS: 0.97 MG/DL (ref 0.5–1.05)
ERYTHROCYTE DISTRIBUTION WIDTH (RATIO) BY AUTOMATED COUNT: 15 % (ref 11.5–14.5)
ERYTHROCYTE MEAN CORPUSCULAR HEMOGLOBIN CONCENTRATION (G/DL) BY AUTOMATED: 31.3 G/DL (ref 32–36)
ERYTHROCYTE MEAN CORPUSCULAR VOLUME (FL) BY AUTOMATED COUNT: 96 FL (ref 80–100)
ERYTHROCYTES (10*6/UL) IN BLOOD BY AUTOMATED COUNT: 5.21 X10E12/L (ref 4–5.2)
GFR FEMALE: 62 ML/MIN/1.73M2
GLUCOSE (MG/DL) IN SER/PLAS: 151 MG/DL (ref 74–99)
HEMATOCRIT (%) IN BLOOD BY AUTOMATED COUNT: 49.8 % (ref 36–46)
HEMOGLOBIN (G/DL) IN BLOOD: 15.6 G/DL (ref 12–16)
LEUKOCYTES (10*3/UL) IN BLOOD BY AUTOMATED COUNT: 4.2 X10E9/L (ref 4.4–11.3)
MAGNESIUM (MG/DL) IN SER/PLAS: 1.7 MG/DL (ref 1.6–2.4)
PHOSPHATE (MG/DL) IN SER/PLAS: 2.9 MG/DL (ref 2.5–4.9)
PLATELETS (10*3/UL) IN BLOOD AUTOMATED COUNT: 240 X10E9/L (ref 150–450)
POTASSIUM (MMOL/L) IN SER/PLAS: 4 MMOL/L (ref 3.5–5.3)
SODIUM (MMOL/L) IN SER/PLAS: 135 MMOL/L (ref 136–145)
TACROLIMUS (NG/ML) IN BLOOD: 9.5 NG/ML (ref 2–15)
UREA NITROGEN (MG/DL) IN SER/PLAS: 17 MG/DL (ref 6–23)

## 2023-07-17 LAB
ALBUMIN (G/DL) IN SER/PLAS: 4.1 G/DL (ref 3.4–5)
ANION GAP IN SER/PLAS: 14 MMOL/L (ref 10–20)
CALCIUM (MG/DL) IN SER/PLAS: 8.3 MG/DL (ref 8.6–10.3)
CARBON DIOXIDE, TOTAL (MMOL/L) IN SER/PLAS: 25 MMOL/L (ref 21–32)
CHLORIDE (MMOL/L) IN SER/PLAS: 102 MMOL/L (ref 98–107)
CREATININE (MG/DL) IN SER/PLAS: 0.91 MG/DL (ref 0.5–1.05)
ERYTHROCYTE DISTRIBUTION WIDTH (RATIO) BY AUTOMATED COUNT: 15.2 % (ref 11.5–14.5)
ERYTHROCYTE MEAN CORPUSCULAR HEMOGLOBIN CONCENTRATION (G/DL) BY AUTOMATED: 31.4 G/DL (ref 32–36)
ERYTHROCYTE MEAN CORPUSCULAR VOLUME (FL) BY AUTOMATED COUNT: 96 FL (ref 80–100)
ERYTHROCYTES (10*6/UL) IN BLOOD BY AUTOMATED COUNT: 4.95 X10E12/L (ref 4–5.2)
GFR FEMALE: 67 ML/MIN/1.73M2
GLUCOSE (MG/DL) IN SER/PLAS: 139 MG/DL (ref 74–99)
HEMATOCRIT (%) IN BLOOD BY AUTOMATED COUNT: 47.4 % (ref 36–46)
HEMOGLOBIN (G/DL) IN BLOOD: 14.9 G/DL (ref 12–16)
HEPATITIS B VIRUS CORE AB (PRESENCE) IN SER/PLAS BY IMM: NONREACTIVE
HEPATITIS B VIRUS SURFACE AG PRESENCE IN SERUM: NONREACTIVE
LEUKOCYTES (10*3/UL) IN BLOOD BY AUTOMATED COUNT: 5 X10E9/L (ref 4.4–11.3)
MAGNESIUM (MG/DL) IN SER/PLAS: 1.7 MG/DL (ref 1.6–2.4)
PHOSPHATE (MG/DL) IN SER/PLAS: 3.3 MG/DL (ref 2.5–4.9)
PLATELETS (10*3/UL) IN BLOOD AUTOMATED COUNT: 233 X10E9/L (ref 150–450)
POTASSIUM (MMOL/L) IN SER/PLAS: 3.9 MMOL/L (ref 3.5–5.3)
SODIUM (MMOL/L) IN SER/PLAS: 137 MMOL/L (ref 136–145)
TACROLIMUS (NG/ML) IN BLOOD: 10.1 NG/ML (ref 2–15)
UREA NITROGEN (MG/DL) IN SER/PLAS: 22 MG/DL (ref 6–23)

## 2023-07-31 LAB
ALBUMIN (G/DL) IN SER/PLAS: 4.2 G/DL (ref 3.4–5)
ANION GAP IN SER/PLAS: 11 MMOL/L (ref 10–20)
CALCIUM (MG/DL) IN SER/PLAS: 8.9 MG/DL (ref 8.6–10.3)
CARBON DIOXIDE, TOTAL (MMOL/L) IN SER/PLAS: 27 MMOL/L (ref 21–32)
CHLORIDE (MMOL/L) IN SER/PLAS: 104 MMOL/L (ref 98–107)
CREATININE (MG/DL) IN SER/PLAS: 0.91 MG/DL (ref 0.5–1.05)
ERYTHROCYTE DISTRIBUTION WIDTH (RATIO) BY AUTOMATED COUNT: 15.1 % (ref 11.5–14.5)
ERYTHROCYTE MEAN CORPUSCULAR HEMOGLOBIN CONCENTRATION (G/DL) BY AUTOMATED: 31.2 G/DL (ref 32–36)
ERYTHROCYTE MEAN CORPUSCULAR VOLUME (FL) BY AUTOMATED COUNT: 97 FL (ref 80–100)
ERYTHROCYTES (10*6/UL) IN BLOOD BY AUTOMATED COUNT: 5.01 X10E12/L (ref 4–5.2)
GFR FEMALE: 67 ML/MIN/1.73M2
GLUCOSE (MG/DL) IN SER/PLAS: 132 MG/DL (ref 74–99)
HEMATOCRIT (%) IN BLOOD BY AUTOMATED COUNT: 48.4 % (ref 36–46)
HEMOGLOBIN (G/DL) IN BLOOD: 15.1 G/DL (ref 12–16)
LEUKOCYTES (10*3/UL) IN BLOOD BY AUTOMATED COUNT: 4.1 X10E9/L (ref 4.4–11.3)
MAGNESIUM (MG/DL) IN SER/PLAS: 1.9 MG/DL (ref 1.6–2.4)
PHOSPHATE (MG/DL) IN SER/PLAS: 3.1 MG/DL (ref 2.5–4.9)
PLATELETS (10*3/UL) IN BLOOD AUTOMATED COUNT: 242 X10E9/L (ref 150–450)
POTASSIUM (MMOL/L) IN SER/PLAS: 4 MMOL/L (ref 3.5–5.3)
SODIUM (MMOL/L) IN SER/PLAS: 138 MMOL/L (ref 136–145)
TACROLIMUS (NG/ML) IN BLOOD: 3.6 NG/ML (ref 2–15)
UREA NITROGEN (MG/DL) IN SER/PLAS: 13 MG/DL (ref 6–23)

## 2023-08-14 LAB
ALBUMIN (G/DL) IN SER/PLAS: 4.1 G/DL (ref 3.4–5)
ANION GAP IN SER/PLAS: 12 MMOL/L (ref 10–20)
CALCIUM (MG/DL) IN SER/PLAS: 8.8 MG/DL (ref 8.6–10.3)
CARBON DIOXIDE, TOTAL (MMOL/L) IN SER/PLAS: 27 MMOL/L (ref 21–32)
CHLORIDE (MMOL/L) IN SER/PLAS: 100 MMOL/L (ref 98–107)
CREATININE (MG/DL) IN SER/PLAS: 0.83 MG/DL (ref 0.5–1.05)
CREATININE (MG/DL) IN URINE: 15.6 MG/DL (ref 20–320)
ERYTHROCYTE DISTRIBUTION WIDTH (RATIO) BY AUTOMATED COUNT: 14.8 % (ref 11.5–14.5)
ERYTHROCYTE MEAN CORPUSCULAR HEMOGLOBIN CONCENTRATION (G/DL) BY AUTOMATED: 31.6 G/DL (ref 32–36)
ERYTHROCYTE MEAN CORPUSCULAR VOLUME (FL) BY AUTOMATED COUNT: 96 FL (ref 80–100)
ERYTHROCYTES (10*6/UL) IN BLOOD BY AUTOMATED COUNT: 4.83 X10E12/L (ref 4–5.2)
GFR FEMALE: 75 ML/MIN/1.73M2
GLUCOSE (MG/DL) IN SER/PLAS: 136 MG/DL (ref 74–99)
HEMATOCRIT (%) IN BLOOD BY AUTOMATED COUNT: 46.5 % (ref 36–46)
HEMOGLOBIN (G/DL) IN BLOOD: 14.7 G/DL (ref 12–16)
LEUKOCYTES (10*3/UL) IN BLOOD BY AUTOMATED COUNT: 4 X10E9/L (ref 4.4–11.3)
MAGNESIUM (MG/DL) IN SER/PLAS: 1.9 MG/DL (ref 1.6–2.4)
PHOSPHATE (MG/DL) IN SER/PLAS: 3.6 MG/DL (ref 2.5–4.9)
PLATELETS (10*3/UL) IN BLOOD AUTOMATED COUNT: 193 X10E9/L (ref 150–450)
POTASSIUM (MMOL/L) IN SER/PLAS: 4.1 MMOL/L (ref 3.5–5.3)
PROTEIN (MG/DL) IN URINE: <4 MG/DL (ref 5–24)
PROTEIN/CREATININE (MG/MG) IN URINE: ABNORMAL MG/MG CREAT (ref 0–0.17)
SODIUM (MMOL/L) IN SER/PLAS: 135 MMOL/L (ref 136–145)
TACROLIMUS (NG/ML) IN BLOOD: 4.7 NG/ML (ref 2–15)
UREA NITROGEN (MG/DL) IN SER/PLAS: 18 MG/DL (ref 6–23)

## 2023-09-14 LAB
ALBUMIN (G/DL) IN SER/PLAS: 4.4 G/DL (ref 3.4–5)
ANION GAP IN SER/PLAS: 10 MMOL/L (ref 10–20)
CALCIUM (MG/DL) IN SER/PLAS: 9.9 MG/DL (ref 8.6–10.3)
CARBON DIOXIDE, TOTAL (MMOL/L) IN SER/PLAS: 28 MMOL/L (ref 21–32)
CHLORIDE (MMOL/L) IN SER/PLAS: 102 MMOL/L (ref 98–107)
CREATININE (MG/DL) IN SER/PLAS: 0.98 MG/DL (ref 0.5–1.05)
ERYTHROCYTE DISTRIBUTION WIDTH (RATIO) BY AUTOMATED COUNT: 13.5 % (ref 11.5–14.5)
ERYTHROCYTE MEAN CORPUSCULAR HEMOGLOBIN CONCENTRATION (G/DL) BY AUTOMATED: 32.2 G/DL (ref 32–36)
ERYTHROCYTE MEAN CORPUSCULAR VOLUME (FL) BY AUTOMATED COUNT: 95 FL (ref 80–100)
ERYTHROCYTES (10*6/UL) IN BLOOD BY AUTOMATED COUNT: 5.05 X10E12/L (ref 4–5.2)
GFR FEMALE: 62 ML/MIN/1.73M2
GLUCOSE (MG/DL) IN SER/PLAS: 148 MG/DL (ref 74–99)
HEMATOCRIT (%) IN BLOOD BY AUTOMATED COUNT: 47.8 % (ref 36–46)
HEMOGLOBIN (G/DL) IN BLOOD: 15.4 G/DL (ref 12–16)
LEUKOCYTES (10*3/UL) IN BLOOD BY AUTOMATED COUNT: 4.5 X10E9/L (ref 4.4–11.3)
MAGNESIUM (MG/DL) IN SER/PLAS: 1.9 MG/DL (ref 1.6–2.4)
PHOSPHATE (MG/DL) IN SER/PLAS: 2.8 MG/DL (ref 2.5–4.9)
PLATELETS (10*3/UL) IN BLOOD AUTOMATED COUNT: 185 X10E9/L (ref 150–450)
POTASSIUM (MMOL/L) IN SER/PLAS: 4.1 MMOL/L (ref 3.5–5.3)
SODIUM (MMOL/L) IN SER/PLAS: 136 MMOL/L (ref 136–145)
TACROLIMUS (NG/ML) IN BLOOD: 5.8 NG/ML (ref 2–15)
UREA NITROGEN (MG/DL) IN SER/PLAS: 16 MG/DL (ref 6–23)

## 2023-10-02 ENCOUNTER — LAB (OUTPATIENT)
Dept: LAB | Facility: LAB | Age: 73
End: 2023-10-02
Payer: COMMERCIAL

## 2023-10-02 DIAGNOSIS — B25.9 CYTOMEGALOVIRAL DISEASE, UNSPECIFIED (MULTI): ICD-10-CM

## 2023-10-02 DIAGNOSIS — D84.9 IMMUNODEFICIENCY, UNSPECIFIED (MULTI): Primary | ICD-10-CM

## 2023-10-02 DIAGNOSIS — Z94.0 KIDNEY TRANSPLANT STATUS (HHS-HCC): ICD-10-CM

## 2023-10-02 PROCEDURE — 36415 COLL VENOUS BLD VENIPUNCTURE: CPT

## 2023-10-03 LAB — PTH-INTACT SERPL-MCNC: 130.3 PG/ML (ref 18.5–88)

## 2023-10-04 LAB
CMV DNA SERPL NAA+PROBE-LOG IU: ABNORMAL {LOG_IU}/ML
LABORATORY COMMENT REPORT: ABNORMAL

## 2023-10-06 ENCOUNTER — TELEPHONE (OUTPATIENT)
Dept: TRANSPLANT | Facility: HOSPITAL | Age: 73
End: 2023-10-06
Payer: COMMERCIAL

## 2023-10-06 DIAGNOSIS — Z94.0 KIDNEY REPLACED BY TRANSPLANT (HHS-HCC): ICD-10-CM

## 2023-10-11 ENCOUNTER — SPECIALTY PHARMACY (OUTPATIENT)
Dept: PHARMACY | Facility: CLINIC | Age: 73
End: 2023-10-11

## 2023-10-11 ENCOUNTER — PHARMACY VISIT (OUTPATIENT)
Dept: PHARMACY | Facility: CLINIC | Age: 73
End: 2023-10-11
Payer: COMMERCIAL

## 2023-10-11 DIAGNOSIS — E11.9 TYPE 2 DIABETES MELLITUS WITHOUT COMPLICATION, WITH LONG-TERM CURRENT USE OF INSULIN (MULTI): ICD-10-CM

## 2023-10-11 DIAGNOSIS — Z79.4 TYPE 2 DIABETES MELLITUS WITHOUT COMPLICATION, WITH LONG-TERM CURRENT USE OF INSULIN (MULTI): ICD-10-CM

## 2023-10-11 PROCEDURE — RXMED WILLOW AMBULATORY MEDICATION CHARGE

## 2023-10-11 RX ORDER — INSULIN LISPRO 100 [IU]/ML
8 INJECTION, SOLUTION INTRAVENOUS; SUBCUTANEOUS
Qty: 30 ML | Refills: 0 | Status: SHIPPED | OUTPATIENT
Start: 2023-10-11 | End: 2024-06-06 | Stop reason: SDUPTHER

## 2023-10-11 NOTE — PROGRESS NOTES
Deliver 10-12-23 Asa, MMF, Ozempic, Tacrolimus 0.5mg & 1mg.  Has too much pred on hand.  [facility] to transfer Lispro script into Epic, Patient requested to refill.

## 2023-10-13 ENCOUNTER — SPECIALTY PHARMACY (OUTPATIENT)
Dept: PHARMACY | Facility: CLINIC | Age: 73
End: 2023-10-13

## 2023-10-13 ENCOUNTER — PHARMACY VISIT (OUTPATIENT)
Dept: PHARMACY | Facility: CLINIC | Age: 73
End: 2023-10-13
Payer: COMMERCIAL

## 2023-10-13 PROCEDURE — RXMED WILLOW AMBULATORY MEDICATION CHARGE

## 2023-10-13 RX ORDER — INSULIN LISPRO 100 [IU]/ML
INJECTION, SOLUTION INTRAVENOUS; SUBCUTANEOUS
Qty: 30 ML | Refills: 0 | OUTPATIENT
Start: 2023-10-11 | End: 2023-12-08 | Stop reason: WASHOUT

## 2023-10-16 ENCOUNTER — OFFICE VISIT (OUTPATIENT)
Dept: PRIMARY CARE | Facility: CLINIC | Age: 73
End: 2023-10-16
Payer: COMMERCIAL

## 2023-10-16 VITALS
HEART RATE: 89 BPM | WEIGHT: 214.1 LBS | SYSTOLIC BLOOD PRESSURE: 153 MMHG | TEMPERATURE: 97.9 F | HEIGHT: 66 IN | BODY MASS INDEX: 34.41 KG/M2 | OXYGEN SATURATION: 98 % | DIASTOLIC BLOOD PRESSURE: 78 MMHG

## 2023-10-16 DIAGNOSIS — Z12.31 ENCOUNTER FOR SCREENING MAMMOGRAM FOR MALIGNANT NEOPLASM OF BREAST: ICD-10-CM

## 2023-10-16 DIAGNOSIS — Z00.00 HEALTH CARE MAINTENANCE: Primary | ICD-10-CM

## 2023-10-16 PROCEDURE — 3051F HG A1C>EQUAL 7.0%<8.0%: CPT

## 2023-10-16 PROCEDURE — 87624 HPV HI-RISK TYP POOLED RSLT: CPT

## 2023-10-16 PROCEDURE — 3078F DIAST BP <80 MM HG: CPT

## 2023-10-16 PROCEDURE — 88175 CYTOPATH C/V AUTO FLUID REDO: CPT

## 2023-10-16 PROCEDURE — 1159F MED LIST DOCD IN RCRD: CPT

## 2023-10-16 PROCEDURE — 99397 PER PM REEVAL EST PAT 65+ YR: CPT

## 2023-10-16 PROCEDURE — 3048F LDL-C <100 MG/DL: CPT

## 2023-10-16 PROCEDURE — 3077F SYST BP >= 140 MM HG: CPT

## 2023-10-16 PROCEDURE — 3008F BODY MASS INDEX DOCD: CPT

## 2023-10-16 PROCEDURE — 1160F RVW MEDS BY RX/DR IN RCRD: CPT

## 2023-10-16 PROCEDURE — 1126F AMNT PAIN NOTED NONE PRSNT: CPT

## 2023-10-16 PROCEDURE — 1036F TOBACCO NON-USER: CPT

## 2023-10-16 ASSESSMENT — PAIN SCALES - GENERAL: PAINLEVEL: 0-NO PAIN

## 2023-10-16 ASSESSMENT — ENCOUNTER SYMPTOMS: DEPRESSION: 0

## 2023-10-16 NOTE — PATIENT INSTRUCTIONS
It was so great to see you today Jacklyn Villa  . Today we discussed:    -Get flu and covid shot at pharmacy  -Ordered Colonoscopy Screening & Mammogram. Call number below to schedule  -Did pap smear today  -Obtain blood draw lipid panel     Call [phone]  For Care if you need to schedule appointment with specialist or images.    Follow up in       You were see by:    Dr. Joann Mayo D.O.  [facility]   Phone: [phone]

## 2023-10-16 NOTE — PROGRESS NOTES
Subjective   Patient ID: Jacklyn is a 73 y.o. 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    # Health Maintenance  - Patient's rating of their own health: Good  - Dental Care: last prior dental visit false teeth, due, need to establich care  // brushes teeth every night// denies current tooth pain  - Vision: last prior ophtho visit couples// corrective devices: glasses//   - Hearing: denies recent hearing loss, slight hearing loss in left ear  - Diet: eats red meat daily, eat spinach broccoli, green beans, not too many fruits, rye bread  - Exercise: not able to exercise because fatigue with few paces, out of breath    - Weight:  fluctuates with what I eat , 211-213bls  - Smoking: ex-smoker quit 2016  - EtOH: Alcohol Use: No, patient does not drink alcohol.  - Illicit substances: denied.  - Employment: Retired  - Living Situation: Currently son and GF but will live alone  - Colon CA: last prior screening Colonoscopy in 2019// family h/o colon ca? No  -Colonoscopy: 12/5/2019 - Preparation of the colon was fair.                         - Hemorrhoids found on perianal exam.                         - Perianal skin tags found on perianal                          - Repeat colonoscopy in 3 years because the bowel                          preparation was suboptimal and for     WOMEN  - Menstrual Status: Menopausal  - No LMP recorded.  - Pregnancy history: No obstetric history on file.  - Bladder dysfunction? has not had any episodes of incontinence  - Cervical CA: last prior pap 5/21/2018// h/o abnormal pap? No  - Breast CA: last prior mammo 7/26/22 // h/o abnormal mammo? No    ROS  10pt ROS reviewed and negative    Current Outpatient Medications   Medication Instructions    amLODIPine (Norvasc) 10 mg tablet 1 tablet, oral, Daily    amLODIPine (Norvasc) 10 mg tablet TAKE ONE (1) TABLET BY MOUTH ONCE DAILY.    aspirin 325 mg EC tablet 1 tablet,  "oral, Every morning    aspirin 325 mg EC tablet TAKE 1 TABLET DAILY AT 9 AM    aspirin 325 mg EC tablet TAKE ONE (1) TABLET BY MOUTH ONCE DAILY AT 9AM    blood-glucose sensor (DEXCOM G6 SENSOR MISC) miscellaneous, Change sensor every 10 days as directed    blood-glucose transmitter (DEXCOM G6 TRANSMITTER MISC) miscellaneous, Every 3 months, Replace transmitter    carvedilol (Coreg) 25 mg tablet 2 tablets, oral, 2 times daily    cholecalciferol (Vitamin D-3) 50 mcg (2,000 unit) capsule 1 capsule, oral, Daily    clindamycin (Cleocin T) 1 % lotion 1 Application, Topical, 2 times daily, To face    Farxiga 10 mg 1 tablet, oral, Daily    hydrALAZINE (Apresoline) 100 mg tablet 1 tablet, oral, 3 times daily    hydrALAZINE (Apresoline) 100 mg tablet TAKE ONE (1) TABLET BY MOUTH 3 TIMES DAILY    insulin glargine (Lantus) 100 unit/mL (3 mL) pen PLEASE INJECT 20 UNITS UNDER THE SKIN ONCE EVERY MORNING    insulin glargine (LANTUS) 20 Units, subcutaneous, Every morning    insulin lispro (HumaLOG) 100 unit/mL injection Inject 8 units under the skin 3 times daily with meals and sliding scale. Up to 40 units daily.    insulin lispro (HUMALOG) 8 Units, subcutaneous, 3 times daily with meals, with meals plus sliding scale, expect up to 40 units daily    ketoconazole (NIZOral) 2 % shampoo Topical    lancets misc miscellaneous, Use as directed    lovastatin (MEVACOR) 40 mg, oral, Nightly    magnesium oxide (Mag-Ox) 400 mg (241.3 mg magnesium) tablet 2 tablets, oral, 2 times daily    mycophenolate (Cellcept) 250 mg capsule 2 capsules, oral, 2 times daily    mycophenolate (Cellcept) 250 mg capsule TAKE THREE (3) CAPSULES BY MOUTH TWICE A DAY    OneTouch Delica Plus Lancet 33 gauge misc Use as directed. Please dispense any brand covered by insurance and compatible with her device    Ozempic 1 mg, subcutaneous, Weekly    pen needle, diabetic 32 gauge x 3/16\" needle miscellaneous, 4 times daily, Inject insulin    pen needle, diabetic 32 " "gauge x 3/16\" needle USE AS DIRECTED TO INJECT INSULIN FOUR TIMES DAILY    Phospha 250 Neutral tablet 1 tablet, oral, Every 12 hours    predniSONE (Deltasone) 5 mg tablet 1 tablet, oral, Daily    predniSONE (Deltasone) 5 mg tablet TAKE ONE (1) TABLET BY MOUTH ONCE DAILY.    semaglutide 2 mg/dose (8 mg/3 mL) pen injector INJECT 2MG UNDER THE SKIN ONCE WEEKLY AS DIRECTED.    tacrolimus (Prograf) 0.5 mg capsule 1 capsule, oral, 2 times daily, At 9am and 9pm. On draw days, take dose after your blood has been drawn    tacrolimus (Prograf) 0.5 mg capsule TAKE ONE (1) CAPSULE BY MOUTH TWICE DAILY. TAKE AT 9AM AND 9PM. ON LAB DRAW DAYS, TAKE DOSE AFTER YOUR BLOOD HAS BEEN DRAWN.    tacrolimus (Prograf) 1 mg capsule 1 capsule, oral, Every 12 hours    tacrolimus (Prograf) 1 mg capsule TAKE ONE (1) CAPSULE BY MOUTH EVERY 12 HOURS    tenofovir alafenamide (Vemlidy) 25 mg tablet tablet 1 tablet, oral, Daily       Objective     /78 (BP Location: Right arm, Patient Position: Sitting)   Pulse 89   Temp 36.6 °C (97.9 °F) (Temporal)   Ht 1.664 m (5' 5.5\")   Wt 97.1 kg (214 lb 1.6 oz)   SpO2 98%   BMI 35.09 kg/m²      Physical Exam  Constitutional:       General: She is not in acute distress.     Appearance: Normal appearance. She is not ill-appearing.   HENT:      Head: Normocephalic and atraumatic.      Nose: Nose normal.      Mouth/Throat:      Mouth: Mucous membranes are moist.      Comments: Dentures  Eyes:      Extraocular Movements: Extraocular movements intact.      Conjunctiva/sclera: Conjunctivae normal.      Comments: Wears glasses   Cardiovascular:      Rate and Rhythm: Normal rate and regular rhythm.      Heart sounds: No murmur heard.  Pulmonary:      Effort: No respiratory distress.      Breath sounds: Normal breath sounds.   Abdominal:      General: There is no distension.      Palpations: Abdomen is soft.      Tenderness: There is no abdominal tenderness.   Genitourinary:     General: Normal vulva.      " Exam position: Lithotomy position.      Labia:         Right: No rash, tenderness or lesion.       Vagina: No vaginal discharge.      Comments: Normal cervix  Musculoskeletal:      Cervical back: Normal range of motion.   Skin:     General: Skin is warm and dry.      Capillary Refill: Capillary refill takes 2 to 3 seconds.   Neurological:      General: No focal deficit present.      Mental Status: She is alert and oriented to person, place, and time.   Psychiatric:         Mood and Affect: Mood normal.         Behavior: Behavior normal.         Assessment/Plan     # Routine Health Maintenance  Immunization History   Administered Date(s) Administered    Influenza, seasonal, injectable 10/12/2022    Moderna SARS-CoV-2 Vaccination 02/08/2021, 03/08/2021, 12/17/2021, 10/17/2022    Pneumococcal conjugate vaccine, 13-valent (PREVNAR 13) 11/10/2016    Pneumococcal polysaccharide vaccine, 23-valent, age 2 years and older (PNEUMOVAX 23) 12/06/2018    Tdap vaccine, age 7 year and older (BOOSTRIX) 02/09/2017    Zoster vaccine, recombinant, adult (SHINGRIX) 03/19/2020, 08/10/2020    Zoster, live 02/09/2017     - Flu vaccine: recommended annually, will get at pharmacy  - COVID vaccine: recommended completion of primary series and recommended boosters,   - Lipid Panel (35M,45F): ordered  - DM screening:   Lab Results   Component Value Date    HGBA1C 7.5 (A) 05/22/2023    ---Continue to follow up Endocrinology  - HTN screening: Home BP log in media, average within goal systolic's 130s  - Last Dental: recommended follow up every 6mo   - Last Eye exam: recommended follow up annually   - Colonoscopy (45-75): Due placed order  - Pap smear (21-65F): performed today  - Breast CA screening (40-74F): ordered mammogram  - Osteoporosis (65+F): Consider dexa at next visit    Problem List Items Addressed This Visit    None  Visit Diagnoses       Health care maintenance    -  Primary            Attending Supervision: discussed with attending  physician (cosigner listed on this note).    RTC in 6 months, or earlier as needed.    Reviewed and approved by ISIDRO ROB on 10/16/23 at 8:06 AM.

## 2023-10-20 ENCOUNTER — LAB (OUTPATIENT)
Dept: LAB | Facility: LAB | Age: 73
End: 2023-10-20
Payer: COMMERCIAL

## 2023-10-20 ENCOUNTER — HOSPITAL ENCOUNTER (OUTPATIENT)
Dept: RADIOLOGY | Facility: HOSPITAL | Age: 73
Discharge: HOME | End: 2023-10-20
Payer: COMMERCIAL

## 2023-10-20 DIAGNOSIS — Z00.00 HEALTH CARE MAINTENANCE: ICD-10-CM

## 2023-10-20 DIAGNOSIS — Z94.0 KIDNEY REPLACED BY TRANSPLANT (HHS-HCC): ICD-10-CM

## 2023-10-20 DIAGNOSIS — Z12.31 ENCOUNTER FOR SCREENING MAMMOGRAM FOR MALIGNANT NEOPLASM OF BREAST: ICD-10-CM

## 2023-10-20 LAB
CHOLEST SERPL-MCNC: 150 MG/DL (ref 0–199)
CHOLESTEROL/HDL RATIO: 2.9
HDLC SERPL-MCNC: 51.6 MG/DL
LDLC SERPL CALC-MCNC: 80 MG/DL
NON HDL CHOLESTEROL: 98 MG/DL (ref 0–149)
TRIGL SERPL-MCNC: 94 MG/DL (ref 0–149)
VLDL: 19 MG/DL (ref 0–40)

## 2023-10-20 PROCEDURE — 77067 SCR MAMMO BI INCL CAD: CPT

## 2023-10-20 PROCEDURE — 77067 SCR MAMMO BI INCL CAD: CPT | Performed by: RADIOLOGY

## 2023-10-20 PROCEDURE — 80061 LIPID PANEL: CPT

## 2023-10-20 PROCEDURE — 36415 COLL VENOUS BLD VENIPUNCTURE: CPT

## 2023-10-20 PROCEDURE — 77063 BREAST TOMOSYNTHESIS BI: CPT | Performed by: RADIOLOGY

## 2023-10-22 LAB
CMV DNA SERPL NAA+PROBE-ACNC: 36 IU/ML
CMV DNA SERPL NAA+PROBE-LOG IU: 1.56 LOG IU/ML
LABORATORY COMMENT REPORT: DETECTED

## 2023-10-23 ENCOUNTER — TELEPHONE (OUTPATIENT)
Dept: TRANSPLANT | Facility: HOSPITAL | Age: 73
End: 2023-10-23
Payer: COMMERCIAL

## 2023-10-23 RX ORDER — AMLODIPINE BESYLATE 10 MG/1
10 TABLET ORAL DAILY
Qty: 90 TABLET | Refills: 3 | Status: CANCELLED | OUTPATIENT
Start: 2023-10-23 | End: 2024-10-21

## 2023-10-23 NOTE — TELEPHONE ENCOUNTER
Labs reviewed with Dr. Moreno  CMV 36 (NQ, NQ)  On  tac 1.5 BID  PLAN: Decrease .  Discussed with plan with patient.  She will have her labs redrawn first week of November.

## 2023-10-24 NOTE — PROGRESS NOTES
I reviewed the resident's documentation and discussed the patient with the resident. I agree with the resident's medical decision making as documented in the note.     Farzana Velazquez MD

## 2023-10-25 DIAGNOSIS — E11.65 TYPE 2 DIABETES MELLITUS WITH HYPERGLYCEMIA, UNSPECIFIED WHETHER LONG TERM INSULIN USE (MULTI): Primary | ICD-10-CM

## 2023-10-29 RX ORDER — DAPAGLIFLOZIN 10 MG/1
10 TABLET, FILM COATED ORAL DAILY
Qty: 30 TABLET | Refills: 0 | Status: SHIPPED | OUTPATIENT
Start: 2023-10-29 | End: 2023-10-30 | Stop reason: SDUPTHER

## 2023-10-30 DIAGNOSIS — E11.65 TYPE 2 DIABETES MELLITUS WITH HYPERGLYCEMIA, UNSPECIFIED WHETHER LONG TERM INSULIN USE (MULTI): ICD-10-CM

## 2023-10-30 RX ORDER — DAPAGLIFLOZIN 10 MG/1
10 TABLET, FILM COATED ORAL DAILY
Qty: 90 TABLET | Refills: 1 | Status: SHIPPED | OUTPATIENT
Start: 2023-10-30 | End: 2023-12-08 | Stop reason: SDUPTHER

## 2023-10-31 LAB
CYTOLOGY CMNT CVX/VAG CYTO-IMP: NORMAL
HPV HR GENOTYPES PNL CVX NAA+PROBE: NEGATIVE
HPV HR GENOTYPES PNL CVX NAA+PROBE: NEGATIVE
HPV16 DNA SPEC QL NAA+PROBE: NEGATIVE
HPV18 DNA SPEC QL NAA+PROBE: NEGATIVE
LAB AP HPV GENOTYPE QUESTION: YES
LAB AP HPV HR: NORMAL
LABORATORY COMMENT REPORT: NORMAL
MENSTRUAL HX REPORTED: NORMAL
PATH REPORT.TOTAL CANCER: NORMAL

## 2023-11-03 ENCOUNTER — LAB (OUTPATIENT)
Dept: LAB | Facility: LAB | Age: 73
End: 2023-11-03
Payer: COMMERCIAL

## 2023-11-03 DIAGNOSIS — Z94.0 KIDNEY REPLACED BY TRANSPLANT (HHS-HCC): ICD-10-CM

## 2023-11-03 LAB
ALBUMIN SERPL BCP-MCNC: 4.3 G/DL (ref 3.4–5)
ANION GAP SERPL CALC-SCNC: 15 MMOL/L (ref 10–20)
BUN SERPL-MCNC: 17 MG/DL (ref 6–23)
CALCIUM SERPL-MCNC: 9.6 MG/DL (ref 8.6–10.3)
CHLORIDE SERPL-SCNC: 99 MMOL/L (ref 98–107)
CO2 SERPL-SCNC: 23 MMOL/L (ref 21–32)
CREAT SERPL-MCNC: 0.99 MG/DL (ref 0.5–1.05)
CREAT UR-MCNC: 33.3 MG/DL (ref 20–320)
ERYTHROCYTE [DISTWIDTH] IN BLOOD BY AUTOMATED COUNT: 12.7 % (ref 11.5–14.5)
GFR SERPL CREATININE-BSD FRML MDRD: 61 ML/MIN/1.73M*2
GLUCOSE SERPL-MCNC: 119 MG/DL (ref 74–99)
HCT VFR BLD AUTO: 46.9 % (ref 36–46)
HGB BLD-MCNC: 15 G/DL (ref 12–16)
MAGNESIUM SERPL-MCNC: 1.6 MG/DL (ref 1.6–2.4)
MCH RBC QN AUTO: 29.4 PG (ref 26–34)
MCHC RBC AUTO-ENTMCNC: 32 G/DL (ref 32–36)
MCV RBC AUTO: 92 FL (ref 80–100)
NRBC BLD-RTO: 0 /100 WBCS (ref 0–0)
PHOSPHATE SERPL-MCNC: 2.9 MG/DL (ref 2.5–4.9)
PLATELET # BLD AUTO: 186 X10*3/UL (ref 150–450)
POTASSIUM SERPL-SCNC: 3.8 MMOL/L (ref 3.5–5.3)
PROT UR-ACNC: <4 MG/DL (ref 5–24)
PROT/CREAT UR: ABNORMAL MG/G{CREAT}
RBC # BLD AUTO: 5.1 X10*6/UL (ref 4–5.2)
SODIUM SERPL-SCNC: 133 MMOL/L (ref 136–145)
TACROLIMUS BLD-MCNC: 7.5 NG/ML
WBC # BLD AUTO: 4.1 X10*3/UL (ref 4.4–11.3)

## 2023-11-03 PROCEDURE — 36415 COLL VENOUS BLD VENIPUNCTURE: CPT

## 2023-11-03 PROCEDURE — 80069 RENAL FUNCTION PANEL: CPT

## 2023-11-03 PROCEDURE — 87799 DETECT AGENT NOS DNA QUANT: CPT

## 2023-11-03 PROCEDURE — 83735 ASSAY OF MAGNESIUM: CPT

## 2023-11-03 PROCEDURE — 80197 ASSAY OF TACROLIMUS: CPT

## 2023-11-03 PROCEDURE — 82570 ASSAY OF URINE CREATININE: CPT

## 2023-11-03 PROCEDURE — 84156 ASSAY OF PROTEIN URINE: CPT

## 2023-11-03 PROCEDURE — 85027 COMPLETE CBC AUTOMATED: CPT

## 2023-11-05 LAB
BKV DNA SERPL NAA+PROBE-LOG#: NORMAL {LOG_COPIES}/ML
LABORATORY COMMENT REPORT: NOT DETECTED

## 2023-11-06 ENCOUNTER — PHARMACY VISIT (OUTPATIENT)
Dept: PHARMACY | Facility: CLINIC | Age: 73
End: 2023-11-06
Payer: COMMERCIAL

## 2023-11-06 DIAGNOSIS — I10 HYPERTENSION, UNSPECIFIED TYPE: Primary | ICD-10-CM

## 2023-11-06 DIAGNOSIS — E11.65 TYPE 2 DIABETES MELLITUS WITH HYPERGLYCEMIA, UNSPECIFIED WHETHER LONG TERM INSULIN USE (MULTI): ICD-10-CM

## 2023-11-06 PROCEDURE — RXMED WILLOW AMBULATORY MEDICATION CHARGE

## 2023-11-07 ENCOUNTER — PREP FOR PROCEDURE (OUTPATIENT)
Dept: GASTROENTEROLOGY | Facility: HOSPITAL | Age: 73
End: 2023-11-07
Payer: COMMERCIAL

## 2023-11-07 RX ORDER — AMLODIPINE BESYLATE 10 MG/1
10 TABLET ORAL DAILY
Qty: 90 TABLET | Refills: 3 | Status: SHIPPED | OUTPATIENT
Start: 2023-11-07 | End: 2023-11-21 | Stop reason: ALTCHOICE

## 2023-11-11 ENCOUNTER — TELEPHONE (OUTPATIENT)
Dept: TRANSPLANT | Facility: HOSPITAL | Age: 73
End: 2023-11-11
Payer: COMMERCIAL

## 2023-11-11 NOTE — TELEPHONE ENCOUNTER
Kidney coordinator on-call:  Pt called with questions about the prep for her upcoming colonoscopy.  I advised her to call her GI doctor and that they would have someone on call.

## 2023-11-14 ENCOUNTER — HOSPITAL ENCOUNTER (OUTPATIENT)
Dept: GASTROENTEROLOGY | Facility: HOSPITAL | Age: 73
Discharge: HOME | End: 2023-11-14
Payer: COMMERCIAL

## 2023-11-14 VITALS
RESPIRATION RATE: 21 BRPM | TEMPERATURE: 97.2 F | SYSTOLIC BLOOD PRESSURE: 132 MMHG | HEART RATE: 83 BPM | OXYGEN SATURATION: 98 % | BODY MASS INDEX: 33.27 KG/M2 | HEIGHT: 66 IN | DIASTOLIC BLOOD PRESSURE: 87 MMHG | WEIGHT: 207 LBS

## 2023-11-14 DIAGNOSIS — Z86.010 HISTORY OF COLON POLYPS: ICD-10-CM

## 2023-11-14 DIAGNOSIS — Z00.00 HEALTH CARE MAINTENANCE: Primary | ICD-10-CM

## 2023-11-14 PROCEDURE — 99152 MOD SED SAME PHYS/QHP 5/>YRS: CPT | Performed by: INTERNAL MEDICINE

## 2023-11-14 PROCEDURE — 45378 DIAGNOSTIC COLONOSCOPY: CPT | Performed by: INTERNAL MEDICINE

## 2023-11-14 PROCEDURE — 3700000012 HC SEDATION LEVEL 5+ TIME - INITIAL 15 MINUTES 5/> YEARS: Performed by: INTERNAL MEDICINE

## 2023-11-14 PROCEDURE — 7100000009 HC PHASE TWO TIME - INITIAL BASE CHARGE: Performed by: INTERNAL MEDICINE

## 2023-11-14 PROCEDURE — G0105 COLORECTAL SCRN; HI RISK IND: HCPCS | Performed by: INTERNAL MEDICINE

## 2023-11-14 PROCEDURE — 7100000010 HC PHASE TWO TIME - EACH INCREMENTAL 1 MINUTE: Performed by: INTERNAL MEDICINE

## 2023-11-14 PROCEDURE — 2500000004 HC RX 250 GENERAL PHARMACY W/ HCPCS (ALT 636 FOR OP/ED): Mod: SE | Performed by: INTERNAL MEDICINE

## 2023-11-14 RX ORDER — FENTANYL CITRATE 50 UG/ML
INJECTION, SOLUTION INTRAMUSCULAR; INTRAVENOUS AS NEEDED
Status: COMPLETED | OUTPATIENT
Start: 2023-11-14 | End: 2023-11-14

## 2023-11-14 RX ORDER — MIDAZOLAM HYDROCHLORIDE 1 MG/ML
INJECTION, SOLUTION INTRAMUSCULAR; INTRAVENOUS AS NEEDED
Status: COMPLETED | OUTPATIENT
Start: 2023-11-14 | End: 2023-11-14

## 2023-11-14 RX ORDER — SODIUM CHLORIDE, SODIUM LACTATE, POTASSIUM CHLORIDE, CALCIUM CHLORIDE 600; 310; 30; 20 MG/100ML; MG/100ML; MG/100ML; MG/100ML
20 INJECTION, SOLUTION INTRAVENOUS CONTINUOUS
Status: CANCELLED | OUTPATIENT
Start: 2023-11-14

## 2023-11-14 RX ADMIN — MIDAZOLAM 2 MG: 1 INJECTION INTRAMUSCULAR; INTRAVENOUS at 11:29

## 2023-11-14 RX ADMIN — FENTANYL CITRATE 50 MCG: 50 INJECTION, SOLUTION INTRAMUSCULAR; INTRAVENOUS at 11:32

## 2023-11-14 RX ADMIN — MIDAZOLAM 2 MG: 1 INJECTION INTRAMUSCULAR; INTRAVENOUS at 11:32

## 2023-11-14 RX ADMIN — FENTANYL CITRATE 50 MCG: 50 INJECTION, SOLUTION INTRAMUSCULAR; INTRAVENOUS at 11:29

## 2023-11-14 ASSESSMENT — PAIN SCALES - GENERAL
PAINLEVEL_OUTOF10: 0 - NO PAIN

## 2023-11-14 ASSESSMENT — PAIN - FUNCTIONAL ASSESSMENT
PAIN_FUNCTIONAL_ASSESSMENT: 0-10

## 2023-11-14 NOTE — PRE-SEDATION DOCUMENTATION
Patient: Jacklyn Villa  MRN: [id number]    Pre-sedation Evaluation:  Sedation necessary for: Analgesia  Requesting service: Gastroenterology    History of Present Illness:  73 year-old female with history of precancerous polyps, ESRD s/p kidney transplant, DM, and HTN who presented for surveillance colonoscopy.  Last colonoscopy in 2019 showed sigmoid diverticulosis and fair prep; patient was recommended 3 year surveillance.  Colonoscopy in 2016 had precancerous polyps.  No GI symptoms.    Past Medical History:   Diagnosis Date    Asymptomatic menopausal state 06/30/2021    Post-menopausal    Chronic kidney disease, stage 3 unspecified (CMS/HCC) 09/16/2022    CKD (chronic kidney disease), stage III    Chronic kidney disease, stage 4 (severe) (CMS/HCC) 10/17/2019    CKD (chronic kidney disease), stage IV    Combined forms of age-related cataract, left eye 07/08/2020    Combined form of age-related cataract, left eye    Combined forms of age-related cataract, right eye 07/08/2020    Combined form of age-related cataract, right eye    Encounter for gynecological examination (general) (routine) without abnormal findings 11/08/2021    Well female exam with routine gynecological exam    Encounter for immunization 02/09/2017    Need for diphtheria-tetanus-pertussis (Tdap) vaccine    Encounter for immunization 02/09/2017    Need for shingles vaccine    Encounter for other preprocedural examination 09/28/2021    Pre-transplant evaluation for kidney transplant    Encounter for screening for malignant neoplasm of cervix 05/21/2018    Screening for cervical cancer    Encounter for screening for malignant neoplasm of colon 11/21/2019    Screening for colon cancer    Encounter for screening for respiratory tuberculosis 01/21/2021    Screening for tuberculosis    Impacted cerumen, right ear 02/11/2016    Impacted cerumen of right ear    Need for assistance at home and no other household member able to render care     Need for home  health care    Personal history of other diseases of the circulatory system     History of hypertension    Personal history of other diseases of the female genital tract 05/21/2018    History of vaginal discharge    Personal history of other diseases of the nervous system and sense organs 01/28/2020    History of viral conjunctivitis    Personal history of other diseases of the nervous system and sense organs 02/11/2016    History of impacted cerumen    Personal history of other diseases of the nervous system and sense organs 02/11/2016    History of impacted cerumen    Personal history of other diseases of the respiratory system 01/28/2020    History of paranasal sinus congestion    Personal history of other drug therapy 11/09/2017    History of influenza vaccination    Personal history of other endocrine, nutritional and metabolic disease 08/19/2022    History of diabetes mellitus    Personal history of other endocrine, nutritional and metabolic disease 09/09/2022    History of hyperlipidemia    Personal history of other endocrine, nutritional and metabolic disease 06/30/2021    History of hyperlipidemia    Personal history of other medical treatment 06/29/2021    History of screening mammography    Personal history of other medical treatment 09/12/2019    History of screening mammography    Type 1 diabetes mellitus with hypoglycemia with coma (CMS/[facility]) 05/27/2022    Type 1 diabetes mellitus with hypoglycemic coma    Type 2 diabetes mellitus without complications (CMS/[facility]) 01/16/2018    Diabetes mellitus type 2 without retinopathy       Principle problems:  Patient Active Problem List    Diagnosis Date Noted    Abnormal mammogram 04/02/2023    Acquired lymphopenia 04/02/2023    Adenomatous polyp of colon 04/02/2023    Anemia 04/02/2023    Bilateral hip pain 04/02/2023    Arthritis of knee 04/02/2023    Astigmatism 04/02/2023    Hyperopia 04/02/2023    Myopia with presbyopia 04/02/2023    Refraction error  04/02/2023    AVF (arteriovenous fistula) (CMS/HCC) 04/02/2023    Calcification of artery 04/02/2023    Benign hypertension 04/02/2023    Bilateral anterior knee pain 04/02/2023    Pain in both knees 04/02/2023    Bilateral carotid bruits 04/02/2023    CKD (chronic kidney disease), stage V (CMS/HCC) 04/02/2023    Closed fracture of distal end of left fibula with routine healing 04/02/2023    Closed fracture of distal end of right fibula, initial encounter 04/02/2023    CMV (cytomegalovirus) (CMS/HCC) 04/02/2023    Cold intolerance 04/02/2023    Combined form of age-related cataract, both eyes 04/02/2023    Combined hyperlipidemia associated with type 2 diabetes mellitus (CMS/HCC) 04/02/2023    Diabetic retinopathy (CMS/HCC) 04/02/2023    Diabetic retinopathy, nonproliferative, mild (CMS/HCC) 04/02/2023    Dry eye syndrome of both eyes 04/02/2023    Dry skin 04/02/2023    Easy bruising 04/02/2023    Epistaxis 04/02/2023    Gastroesophageal reflux disease 04/02/2023    Gout 04/02/2023    Hyperuricemia 04/02/2023    HLD (hyperlipidemia) 04/02/2023    Hyperparathyroidism, secondary (CMS/HCC) 04/02/2023    Hypervolemia 04/02/2023    Immunosuppression (CMS/HCC) 04/02/2023    Ischemia of retina of both eyes 04/02/2023    Kidney replaced by transplant 04/02/2023    Left knee pain 04/02/2023    Meibomian gland dysfunction (MGD) of both eyes 04/02/2023    Osteoarthritis of patellofemoral joints of both knees 04/02/2023    Pink eye disease of both eyes 04/02/2023    Post-operative pain 04/02/2023    Proteinuria 04/02/2023    Renal osteodystrophy 04/02/2023    Right ankle pain 04/02/2023    Scleral injection 04/02/2023    Steroid-induced hyperglycemia 04/02/2023    Tubular adenoma of colon 04/02/2023    Diabetes mellitus with both eyes affected by mild nonproliferative retinopathy without macular edema (CMS/[facility]) 04/02/2023    Vitamin D insufficiency 04/02/2023    Benign hypertensive heart and kidney disease and end stage renal  disease (CMS/[facility]) 04/02/2023    Complication of arteriovenous dialysis fistula 04/02/2023    Condition not found 04/02/2023    Edema of right lower extremity 04/02/2023    Edema of left lower extremity 04/02/2023    Hyperkalemia 04/02/2023    Leukopenia 04/02/2023    Peripheral vascular disease of extremity with claudication (CMS/HCC) 04/02/2023    Renal artery stenosis (CMS/HCC) 04/02/2023     Allergies:  No Known Allergies  PTA/Current Medications:  (Not in a hospital admission)    Current Outpatient Medications   Medication Sig Dispense Refill    amLODIPine (Norvasc) 10 mg tablet TAKE ONE (1) TABLET BY MOUTH ONCE DAILY. 90 tablet 3    aspirin 325 mg EC tablet TAKE 1 TABLET BY MOUTH DAILY AT 9 AM 30 tablet 11    carvedilol (Coreg) 25 mg tablet Take 2 tablets (50 mg) by mouth 2 times a day.      clindamycin (Cleocin T) 1 % lotion Apply 1 Application topically 2 times a day. To face      dapagliflozin propanediol (Farxiga) 10 mg Take 1 tablet (10 mg) by mouth once daily. 90 tablet 1    hydrALAZINE (Apresoline) 100 mg tablet Take 1 tablet (100 mg) by mouth 3 times a day.      insulin glargine (Lantus) 100 unit/mL (3 mL) pen PLEASE INJECT 20 UNITS UNDER THE SKIN ONCE EVERY MORNING 30 mL 3    insulin lispro (HumaLOG) 100 unit/mL injection Inject 8 Units under the skin 3 times a day with meals. with meals plus sliding scale, expect up to 40 units daily 30 mL 0    ketoconazole (NIZOral) 2 % shampoo Apply topically.      lovastatin (Mevacor) 40 mg tablet Take 1 tablet (40 mg) by mouth once daily at bedtime. 90 tablet 3    magnesium oxide (Mag-Ox) 400 mg (241.3 mg magnesium) tablet Take 2 tablets (800 mg) by mouth twice a day.      mycophenolate (Cellcept) 250 mg capsule TAKE THREE (3) CAPSULES BY MOUTH TWICE A  capsule 3    predniSONE (Deltasone) 5 mg tablet TAKE ONE (1) TABLET BY MOUTH ONCE DAILY. 30 tablet 11    semaglutide 2 mg/dose (8 mg/3 mL) pen injector INJECT 2MG UNDER THE SKIN ONCE WEEKLY AS DIRECTED. 3 mL  "4    tacrolimus (Prograf) 0.5 mg capsule TAKE ONE (1) CAPSULE BY MOUTH TWICE DAILY. TAKE AT 9AM AND 9PM. ON LAB DRAW DAYS, TAKE DOSE AFTER YOUR BLOOD HAS BEEN DRAWN. 60 capsule 11    tacrolimus (Prograf) 1 mg capsule Take 1 capsule (1 mg) by mouth every 12 hours.      blood-glucose sensor (DEXCOM G6 SENSOR MISC) Change sensor every 10 days as directed      blood-glucose transmitter (DEXCOM G6 TRANSMITTER MISC) every 3 months. Replace transmitter      cholecalciferol (Vitamin D-3) 50 mcg (2,000 unit) capsule Take 1 capsule (50 mcg) by mouth once daily.      insulin glargine (Lantus) 100 unit/mL (3 mL) pen Inject 20 Units under the skin once daily in the morning.      insulin lispro (HumaLOG) 100 unit/mL injection Inject 8 units under the skin 3 times daily with meals and sliding scale. Up to 40 units daily. (Patient not taking: Reported on 10/16/2023) 30 mL 0    lancets misc Use as directed      mycophenolate (Cellcept) 250 mg capsule Take 2 capsules (500 mg) by mouth 2 times a day.      OneTouch Delica Plus Lancet 33 gauge misc Use as directed. Please dispense any brand covered by insurance and compatible with her device      pen needle, diabetic 32 gauge x 3/16\" needle 4 times a day. Inject insulin      pen needle, diabetic 32 gauge x 3/16\" needle USE AS DIRECTED TO INJECT INSULIN FOUR TIMES DAILY 400 each 3    tenofovir alafenamide (Vemlidy) 25 mg tablet tablet Take 1 tablet (25 mg) by mouth once daily.       No current facility-administered medications for this encounter.     Past Surgical History:   has a past surgical history that includes Other surgical history (09/15/2022); Other surgical history (06/05/2020); Other surgical history (06/05/2020); and Other surgical history (10/17/2019).    Recent sedation/surgery (24 hours) Yes    Review of Systems:  Please check all that apply: Obesity    Pregnancy test completed prior to procedure on any menstruating female: none        NPO guidelines met: Yes    Physical " Exam    Airway  Mallampati: III     Cardiovascular   Rhythm: regular  Rate: normal     Dental    Pulmonary - normal exam  Breath sounds clear to auscultation         Plan    ASA 3       73 year-old female with history of precancerous polyps, ESRD s/p kidney transplant, DM, and HTN who presented for surveillance colonoscopy.  Last colonoscopy in 2019 showed sigmoid diverticulosis and fair prep; patient was recommended 3 year surveillance.  Colonoscopy in 2016 had precancerous polyps.  No GI symptoms.

## 2023-11-15 ASSESSMENT — PAIN SCALES - GENERAL: PAINLEVEL_OUTOF10: 0 - NO PAIN

## 2023-11-16 DIAGNOSIS — E11.65 TYPE 2 DIABETES MELLITUS WITH HYPERGLYCEMIA, UNSPECIFIED WHETHER LONG TERM INSULIN USE (MULTI): ICD-10-CM

## 2023-11-21 ENCOUNTER — OFFICE VISIT (OUTPATIENT)
Dept: TRANSPLANT | Facility: HOSPITAL | Age: 73
End: 2023-11-21
Payer: COMMERCIAL

## 2023-11-21 ENCOUNTER — SPECIALTY PHARMACY (OUTPATIENT)
Dept: PHARMACY | Facility: CLINIC | Age: 73
End: 2023-11-21

## 2023-11-21 ENCOUNTER — PHARMACY VISIT (OUTPATIENT)
Dept: PHARMACY | Facility: CLINIC | Age: 73
End: 2023-11-21
Payer: COMMERCIAL

## 2023-11-21 ENCOUNTER — TELEPHONE (OUTPATIENT)
Dept: TRANSPLANT | Facility: HOSPITAL | Age: 73
End: 2023-11-21

## 2023-11-21 ENCOUNTER — APPOINTMENT (OUTPATIENT)
Dept: TRANSPLANT | Facility: HOSPITAL | Age: 73
End: 2023-11-21
Payer: COMMERCIAL

## 2023-11-21 VITALS
TEMPERATURE: 97.7 F | WEIGHT: 211.5 LBS | SYSTOLIC BLOOD PRESSURE: 129 MMHG | OXYGEN SATURATION: 97 % | BODY MASS INDEX: 34.66 KG/M2 | DIASTOLIC BLOOD PRESSURE: 60 MMHG | HEART RATE: 90 BPM

## 2023-11-21 DIAGNOSIS — I10 HYPERTENSION, UNSPECIFIED TYPE: ICD-10-CM

## 2023-11-21 DIAGNOSIS — D84.9 IMMUNOSUPPRESSION (MULTI): ICD-10-CM

## 2023-11-21 DIAGNOSIS — B25.9 CYTOMEGALOVIRUS (CMV) VIREMIA (MULTI): Primary | ICD-10-CM

## 2023-11-21 DIAGNOSIS — Z94.0 KIDNEY REPLACED BY TRANSPLANT (HHS-HCC): ICD-10-CM

## 2023-11-21 PROCEDURE — 1126F AMNT PAIN NOTED NONE PRSNT: CPT | Performed by: HOSPITALIST

## 2023-11-21 PROCEDURE — 1159F MED LIST DOCD IN RCRD: CPT | Performed by: HOSPITALIST

## 2023-11-21 PROCEDURE — 99215 OFFICE O/P EST HI 40 MIN: CPT

## 2023-11-21 PROCEDURE — RXMED WILLOW AMBULATORY MEDICATION CHARGE

## 2023-11-21 PROCEDURE — 3078F DIAST BP <80 MM HG: CPT | Performed by: HOSPITALIST

## 2023-11-21 PROCEDURE — 1036F TOBACCO NON-USER: CPT | Performed by: HOSPITALIST

## 2023-11-21 PROCEDURE — 1160F RVW MEDS BY RX/DR IN RCRD: CPT | Performed by: HOSPITALIST

## 2023-11-21 PROCEDURE — 4010F ACE/ARB THERAPY RXD/TAKEN: CPT | Performed by: HOSPITALIST

## 2023-11-21 PROCEDURE — 3051F HG A1C>EQUAL 7.0%<8.0%: CPT | Performed by: HOSPITALIST

## 2023-11-21 PROCEDURE — 3074F SYST BP LT 130 MM HG: CPT | Performed by: HOSPITALIST

## 2023-11-21 PROCEDURE — 3061F NEG MICROALBUMINURIA REV: CPT | Performed by: HOSPITALIST

## 2023-11-21 ASSESSMENT — ENCOUNTER SYMPTOMS
COUGH: 0
ABDOMINAL DISTENTION: 0
FLANK PAIN: 0
POLYDIPSIA: 0
PSYCHIATRIC NEGATIVE: 1
CHEST TIGHTNESS: 0
POLYPHAGIA: 0
CARDIOVASCULAR NEGATIVE: 1
BLOOD IN STOOL: 0
NEUROLOGICAL NEGATIVE: 1
HEMATOLOGIC/LYMPHATIC NEGATIVE: 1
RESPIRATORY NEGATIVE: 1
HEMATURIA: 0
SHORTNESS OF BREATH: 0
CONSTIPATION: 0
DYSURIA: 0

## 2023-11-21 ASSESSMENT — PAIN SCALES - GENERAL: PAINLEVEL: 0-NO PAIN

## 2023-11-21 NOTE — PATIENT INSTRUCTIONS
Stop taking amlodipine  No other meds changes today  Labs monthly  Return to see us in 6 months    We re-ordered your Tac and prednisone to  specialty pharmacy    Our phone number is [phone]

## 2023-11-21 NOTE — PROGRESS NOTES
Jacklyn Villa  is a 72 year old female with a history of ESRD secondary to diabetes mellitus and hypertension who received a  donor kidney transplant on 2022 by Dr. Rivera with a KDPI of 78% and PRA of 96%. Donor was HCV -/ R - and has not met risk factors. EBV D+ / R-. Left donor kidney, transplanted to patient right pelvis. Dry weight is 94 kg (discharge weight is 100.7kg ). Had DGF post operatively needing dialysis once due to hyperkalemia with eventual renal recovery.     Subjective: Denied any complaints on today's visit.  Patient ran out of some of her medications recently like amlodipine and prednisone.  Seems blood pressures are well-controlled so we will stop amlodipine and refill rest of the medications.  Discussed with her that she needs to call us and contact us before running out of the medications.    Review of Systems   HENT: Negative.     Respiratory: Negative.  Negative for cough, chest tightness and shortness of breath.    Cardiovascular: Negative.  Negative for leg swelling.   Gastrointestinal:  Negative for abdominal distention, blood in stool and constipation.   Endocrine: Negative for polydipsia, polyphagia and polyuria.   Genitourinary:  Negative for decreased urine volume, dysuria, flank pain, hematuria and urgency.   Neurological: Negative.    Hematological: Negative.    Psychiatric/Behavioral: Negative.          Objective:  Visit Vitals  /60   Pulse 90   Temp 36.5 °C (97.7 °F) (Temporal)   Wt 95.9 kg (211 lb 8 oz)   SpO2 97%   BMI 34.66 kg/m²   OB Status Postmenopausal   Smoking Status Never   BSA 2.11 m²      Physical Exam  HENT:      Head: Normocephalic.   Eyes:      Pupils: Pupils are equal, round, and reactive to light.   Cardiovascular:      Rate and Rhythm: Normal rate and regular rhythm.   Pulmonary:      Effort: Pulmonary effort is normal. No respiratory distress.      Breath sounds: No wheezing or rales.   Abdominal:      General: There is no distension.       Tenderness: There is no abdominal tenderness. There is no rebound.   Musculoskeletal:         General: Normal range of motion.   Skin:     General: Skin is warm.      Findings: No bruising, lesion or rash.   Neurological:      General: No focal deficit present.   Psychiatric:         Mood and Affect: Mood normal.            Current Outpatient Medications:     mycophenolate (Cellcept) 250 mg capsule, Take 2 capsules (500 mg) by mouth 2 times a day., Disp: , Rfl:     amLODIPine (Norvasc) 10 mg tablet, TAKE ONE (1) TABLET BY MOUTH ONCE DAILY., Disp: 90 tablet, Rfl: 3    aspirin 325 mg EC tablet, TAKE 1 TABLET BY MOUTH DAILY AT 9 AM, Disp: 30 tablet, Rfl: 11    blood-glucose sensor (DEXCOM G6 SENSOR MISC), Change sensor every 10 days as directed, Disp: , Rfl:     blood-glucose transmitter (DEXCOM G6 TRANSMITTER MISC), every 3 months. Replace transmitter, Disp: , Rfl:     carvedilol (Coreg) 25 mg tablet, Take 2 tablets (50 mg) by mouth 2 times a day., Disp: , Rfl:     cholecalciferol (Vitamin D-3) 50 mcg (2,000 unit) capsule, Take 1 capsule (50 mcg) by mouth once daily., Disp: , Rfl:     clindamycin (Cleocin T) 1 % lotion, Apply 1 Application topically 2 times a day. To face, Disp: , Rfl:     dapagliflozin propanediol (Farxiga) 10 mg, Take 1 tablet (10 mg) by mouth once daily., Disp: 90 tablet, Rfl: 1    hydrALAZINE (Apresoline) 100 mg tablet, Take 1 tablet (100 mg) by mouth 3 times a day., Disp: , Rfl:     insulin glargine (Lantus) 100 unit/mL (3 mL) pen, Inject 20 Units under the skin once daily in the morning., Disp: , Rfl:     insulin glargine (Lantus) 100 unit/mL (3 mL) pen, PLEASE INJECT 20 UNITS UNDER THE SKIN ONCE EVERY MORNING, Disp: 30 mL, Rfl: 3    insulin lispro (HumaLOG) 100 unit/mL injection, Inject 8 Units under the skin 3 times a day with meals. with meals plus sliding scale, expect up to 40 units daily, Disp: 30 mL, Rfl: 0    insulin lispro (HumaLOG) 100 unit/mL injection, Inject 8 units under the skin 3  "times daily with meals and sliding scale. Up to 40 units daily. (Patient not taking: Reported on 10/16/2023), Disp: 30 mL, Rfl: 0    ketoconazole (NIZOral) 2 % shampoo, Apply topically., Disp: , Rfl:     lancets misc, Use as directed, Disp: , Rfl:     lovastatin (Mevacor) 40 mg tablet, Take 1 tablet (40 mg) by mouth once daily at bedtime., Disp: 90 tablet, Rfl: 3    magnesium oxide (Mag-Ox) 400 mg (241.3 mg magnesium) tablet, Take 2 tablets (800 mg) by mouth twice a day., Disp: , Rfl:     mycophenolate (Cellcept) 250 mg capsule, TAKE THREE (3) CAPSULES BY MOUTH TWICE A DAY, Disp: 540 capsule, Rfl: 3    OneTouch Delica Plus Lancet 33 gauge misc, Use as directed. Please dispense any brand covered by insurance and compatible with her device, Disp: , Rfl:     pen needle, diabetic 32 gauge x 3/16\" needle, 4 times a day. Inject insulin, Disp: , Rfl:     pen needle, diabetic 32 gauge x 3/16\" needle, USE AS DIRECTED TO INJECT INSULIN FOUR TIMES DAILY, Disp: 400 each, Rfl: 3    predniSONE (Deltasone) 5 mg tablet, TAKE ONE (1) TABLET BY MOUTH ONCE DAILY., Disp: 30 tablet, Rfl: 11    semaglutide 2 mg/dose (8 mg/3 mL) pen injector, INJECT 2MG UNDER THE SKIN ONCE WEEKLY AS DIRECTED., Disp: 3 mL, Rfl: 4    tacrolimus (Prograf) 0.5 mg capsule, TAKE ONE (1) CAPSULE BY MOUTH TWICE DAILY. TAKE AT 9AM AND 9PM. ON LAB DRAW DAYS, TAKE DOSE AFTER YOUR BLOOD HAS BEEN DRAWN., Disp: 60 capsule, Rfl: 11    tacrolimus (Prograf) 1 mg capsule, Take 1 capsule (1 mg) by mouth every 12 hours., Disp: , Rfl:     tenofovir alafenamide (Vemlidy) 25 mg tablet tablet, Take 1 tablet (25 mg) by mouth once daily., Disp: , Rfl:      [unfilled]     No images are attached to the encounter.     Assessment and Plan :Jacklyn Villa  is a 72 year old female with a history of ESRD secondary to diabetes mellitus and hypertension who received a  donor kidney transplant on 2022 by Dr. Rivera with a KDPI of 78% and PRA of 96%. Donor was HCV " -/ R - and has not met risk factors. EBV D+ / R-. Left donor kidney, transplanted to patient right pelvis. Dry weight is 94 kg (discharge weight is 100.7kg ). Had DGF post operatively needing dialysis once due to hyperkalemia with eventual renal recovery.     Allograft function: Stable creatinine in the range of 5.9, stable electrolytes.  Last UPC less than 4.  AlloSure result less than 0.12 as of May 2023.    Immunosuppression continue with the current dose of prednisone, patient is on low-dose of mycophenolate because of the CMV viremia continue with the 500 twice daily as the CMV is still detected, continue with the tacrolimus 1.5 twice daily recent tacrolimus levels are around 7.5 Aim levels of 5-8.    Hemodynamics: Blood pressures currently well-controlled patient ran out of amlodipine almost a month so we will stop amlodipine and just continue with carvedilol.    Anemia/leukopenia: Currently stable no indication for KLAUDIA.    Bone mineral disease: Calcium and phosphorus levels are optimal continue with the current management.    General health care: Recommended age-appropriate screening.  Patient had a colonoscopy recently-seems to have scattered diverticula otherwise having some hemorrhoids.  Recommended flu shot and COVID shots.    Labs monthly and follow-up in 6 months.        Martin Yee MD

## 2023-11-22 ENCOUNTER — PHARMACY VISIT (OUTPATIENT)
Dept: PHARMACY | Facility: CLINIC | Age: 73
End: 2023-11-22
Payer: COMMERCIAL

## 2023-11-22 PROCEDURE — RXMED WILLOW AMBULATORY MEDICATION CHARGE

## 2023-11-22 RX ORDER — PREDNISONE 5 MG/1
5 TABLET ORAL DAILY
Qty: 30 TABLET | Refills: 11 | Status: SHIPPED | OUTPATIENT
Start: 2023-11-22 | End: 2024-01-22 | Stop reason: SDUPTHER

## 2023-11-22 RX ORDER — TACROLIMUS 0.5 MG/1
CAPSULE ORAL
Qty: 60 CAPSULE | Refills: 11 | Status: SHIPPED | OUTPATIENT
Start: 2023-11-22 | End: 2024-01-22 | Stop reason: SDUPTHER

## 2023-11-22 RX ORDER — TACROLIMUS 1 MG/1
1 CAPSULE ORAL EVERY 12 HOURS
Qty: 60 CAPSULE | Refills: 11 | Status: SHIPPED | OUTPATIENT
Start: 2023-11-22 | End: 2024-01-22 | Stop reason: SDUPTHER

## 2023-11-27 ENCOUNTER — SPECIALTY PHARMACY (OUTPATIENT)
Dept: PHARMACY | Facility: CLINIC | Age: 73
End: 2023-11-27

## 2023-12-04 ENCOUNTER — LAB (OUTPATIENT)
Dept: LAB | Facility: LAB | Age: 73
End: 2023-12-04
Payer: COMMERCIAL

## 2023-12-04 DIAGNOSIS — Z94.0 KIDNEY TRANSPLANT STATUS (HHS-HCC): Primary | ICD-10-CM

## 2023-12-04 DIAGNOSIS — Z94.0 KIDNEY REPLACED BY TRANSPLANT (HHS-HCC): ICD-10-CM

## 2023-12-04 LAB
ALBUMIN SERPL BCP-MCNC: 3.9 G/DL (ref 3.4–5)
ANION GAP SERPL CALC-SCNC: 15 MMOL/L (ref 10–20)
BUN SERPL-MCNC: 20 MG/DL (ref 6–23)
CALCIUM SERPL-MCNC: 9.4 MG/DL (ref 8.6–10.3)
CHLORIDE SERPL-SCNC: 102 MMOL/L (ref 98–107)
CO2 SERPL-SCNC: 23 MMOL/L (ref 21–32)
CREAT SERPL-MCNC: 0.98 MG/DL (ref 0.5–1.05)
CREAT UR-MCNC: 15.7 MG/DL (ref 20–320)
ERYTHROCYTE [DISTWIDTH] IN BLOOD BY AUTOMATED COUNT: 13.2 % (ref 11.5–14.5)
GFR SERPL CREATININE-BSD FRML MDRD: 62 ML/MIN/1.73M*2
GLUCOSE SERPL-MCNC: 129 MG/DL (ref 74–99)
HCT VFR BLD AUTO: 46.5 % (ref 36–46)
HGB BLD-MCNC: 14.5 G/DL (ref 12–16)
MAGNESIUM SERPL-MCNC: 1.5 MG/DL (ref 1.6–2.4)
MCH RBC QN AUTO: 28.7 PG (ref 26–34)
MCHC RBC AUTO-ENTMCNC: 31.2 G/DL (ref 32–36)
MCV RBC AUTO: 92 FL (ref 80–100)
NRBC BLD-RTO: 0 /100 WBCS (ref 0–0)
PHOSPHATE SERPL-MCNC: 2.8 MG/DL (ref 2.5–4.9)
PLATELET # BLD AUTO: 209 X10*3/UL (ref 150–450)
POTASSIUM SERPL-SCNC: 4 MMOL/L (ref 3.5–5.3)
PROT UR-ACNC: <4 MG/DL (ref 5–24)
PROT/CREAT UR: ABNORMAL MG/G{CREAT}
RBC # BLD AUTO: 5.06 X10*6/UL (ref 4–5.2)
SODIUM SERPL-SCNC: 136 MMOL/L (ref 136–145)
TACROLIMUS BLD-MCNC: 7.6 NG/ML
WBC # BLD AUTO: 3.6 X10*3/UL (ref 4.4–11.3)

## 2023-12-04 PROCEDURE — 83735 ASSAY OF MAGNESIUM: CPT

## 2023-12-04 PROCEDURE — 85027 COMPLETE CBC AUTOMATED: CPT

## 2023-12-04 PROCEDURE — 82570 ASSAY OF URINE CREATININE: CPT

## 2023-12-04 PROCEDURE — 87799 DETECT AGENT NOS DNA QUANT: CPT

## 2023-12-04 PROCEDURE — 80197 ASSAY OF TACROLIMUS: CPT

## 2023-12-04 PROCEDURE — 80069 RENAL FUNCTION PANEL: CPT

## 2023-12-04 PROCEDURE — 36415 COLL VENOUS BLD VENIPUNCTURE: CPT

## 2023-12-04 PROCEDURE — 84156 ASSAY OF PROTEIN URINE: CPT

## 2023-12-05 LAB
BKV DNA SERPL NAA+PROBE-LOG#: NORMAL {LOG_COPIES}/ML
LABORATORY COMMENT REPORT: NOT DETECTED

## 2023-12-07 LAB
ALLOSURE SCORE - KIDNEY: 0.12 %
CAREDX_ORDER_ID: NORMAL
CENTER_ORDER_ID: NORMAL
CLIENT SPECIMEN ID - ALLOSURE: NORMAL
DONOR RELATION - ALLOSURE: NORMAL
NOTES - ALLOSURE: NORMAL
RELATIVE CHANGE VALUE - KIDNEY: NORMAL %
TEST COMMENTS - ALLOSURE: NORMAL
TIME POST TX - ALLOSURE: NORMAL
TRANSPLANTED ORGAN - ALLOSURE: NORMAL
TX DATE - ALLOSURE/ALLOMAP: NORMAL
WP_ORDER_ID: NORMAL

## 2023-12-08 ENCOUNTER — OFFICE VISIT (OUTPATIENT)
Dept: ENDOCRINOLOGY | Facility: CLINIC | Age: 73
End: 2023-12-08
Payer: COMMERCIAL

## 2023-12-08 VITALS
DIASTOLIC BLOOD PRESSURE: 65 MMHG | HEART RATE: 92 BPM | BODY MASS INDEX: 33.75 KG/M2 | SYSTOLIC BLOOD PRESSURE: 149 MMHG | HEIGHT: 66 IN | WEIGHT: 210 LBS

## 2023-12-08 DIAGNOSIS — E11.65 TYPE 2 DIABETES MELLITUS WITH HYPERGLYCEMIA, WITH LONG-TERM CURRENT USE OF INSULIN (MULTI): Primary | ICD-10-CM

## 2023-12-08 DIAGNOSIS — Z79.4 TYPE 2 DIABETES MELLITUS WITH HYPERGLYCEMIA, WITH LONG-TERM CURRENT USE OF INSULIN (MULTI): Primary | ICD-10-CM

## 2023-12-08 PROCEDURE — 3048F LDL-C <100 MG/DL: CPT | Performed by: STUDENT IN AN ORGANIZED HEALTH CARE EDUCATION/TRAINING PROGRAM

## 2023-12-08 PROCEDURE — 99214 OFFICE O/P EST MOD 30 MIN: CPT | Performed by: STUDENT IN AN ORGANIZED HEALTH CARE EDUCATION/TRAINING PROGRAM

## 2023-12-08 PROCEDURE — 1159F MED LIST DOCD IN RCRD: CPT | Performed by: STUDENT IN AN ORGANIZED HEALTH CARE EDUCATION/TRAINING PROGRAM

## 2023-12-08 PROCEDURE — 1126F AMNT PAIN NOTED NONE PRSNT: CPT | Performed by: STUDENT IN AN ORGANIZED HEALTH CARE EDUCATION/TRAINING PROGRAM

## 2023-12-08 PROCEDURE — 3062F POS MACROALBUMINURIA REV: CPT | Performed by: STUDENT IN AN ORGANIZED HEALTH CARE EDUCATION/TRAINING PROGRAM

## 2023-12-08 PROCEDURE — 3051F HG A1C>EQUAL 7.0%<8.0%: CPT | Performed by: STUDENT IN AN ORGANIZED HEALTH CARE EDUCATION/TRAINING PROGRAM

## 2023-12-08 PROCEDURE — 3078F DIAST BP <80 MM HG: CPT | Performed by: STUDENT IN AN ORGANIZED HEALTH CARE EDUCATION/TRAINING PROGRAM

## 2023-12-08 PROCEDURE — 95251 CONT GLUC MNTR ANALYSIS I&R: CPT | Performed by: STUDENT IN AN ORGANIZED HEALTH CARE EDUCATION/TRAINING PROGRAM

## 2023-12-08 PROCEDURE — 1160F RVW MEDS BY RX/DR IN RCRD: CPT | Performed by: STUDENT IN AN ORGANIZED HEALTH CARE EDUCATION/TRAINING PROGRAM

## 2023-12-08 PROCEDURE — 3077F SYST BP >= 140 MM HG: CPT | Performed by: STUDENT IN AN ORGANIZED HEALTH CARE EDUCATION/TRAINING PROGRAM

## 2023-12-08 PROCEDURE — 1036F TOBACCO NON-USER: CPT | Performed by: STUDENT IN AN ORGANIZED HEALTH CARE EDUCATION/TRAINING PROGRAM

## 2023-12-08 RX ORDER — DAPAGLIFLOZIN 10 MG/1
10 TABLET, FILM COATED ORAL DAILY
Qty: 90 TABLET | Refills: 1 | Status: SHIPPED | OUTPATIENT
Start: 2023-12-08

## 2023-12-08 RX ORDER — AMLODIPINE BESYLATE 10 MG/1
10 TABLET ORAL DAILY
COMMUNITY
Start: 2022-08-17 | End: 2024-05-05 | Stop reason: ALTCHOICE

## 2023-12-08 RX ORDER — INSULIN GLARGINE 100 [IU]/ML
INJECTION, SOLUTION SUBCUTANEOUS
Qty: 15 ML | Refills: 5 | Status: ON HOLD | OUTPATIENT
Start: 2023-12-08 | End: 2024-05-09

## 2023-12-08 RX ORDER — AMMONIUM LACTATE 12 G/100G
LOTION TOPICAL AS NEEDED
Qty: 396 G | Refills: 3 | Status: SHIPPED | OUTPATIENT
Start: 2023-12-08 | End: 2024-12-07

## 2023-12-08 ASSESSMENT — PAIN SCALES - GENERAL: PAINLEVEL: 0-NO PAIN

## 2023-12-08 NOTE — PATIENT INSTRUCTIONS
-you are doing great  You can continue the lantus 15   Sliding scale with meals-follow the sheet I gave you  Ozempic 2mg   Farxiga    Get your labs done before the next appointment    Follow up in about 5 months    Brenda Casey MD  Divison of Endocrinology   [facility]   Phone: [phone]    option 4, then option 1  Fax: [phone]

## 2023-12-08 NOTE — PROGRESS NOTES
73 F PMH: HLD, HTN, ESRD from htn and DM s/p kidney transplan in 2022, diverticulosis      following up for diabetes     on chronic prednisone 5mg daily      Diabetes History  DM diagnosed > 5 years ago   Complications Micro and Macro-ESRD s/p transplant in 2022, neuropathy   Last A1c 7.5% in may 2023    Regimen   farxiga 10mg   ozempic 2mg -on higher dose for about a month   lantus 15   Lisorp sliding sacle 2:50 >150-->requires about 2 units for lunch      SMBG -Dexcom G6   Hypoglycemia -none      Diet:cutting down on meal portions     Comorbidities and Screening  Eye Exam -4/2023 Mild NPDR   Foot exam -12/8/23   LDL 80  TG 94 lovastatin 40mg   Cr and albuminuria -normal  ACE/ARB-none      PMH; Famhx; Social reviewed and pertinents updated on HPI     Some weight loss   Less appetite   ROS reviewed and is negative except for pertinent findings noted on HPI      Physical Exam  Constitutional:       Appearance: Normal appearance.   Cardiovascular:      Pulses:           Dorsalis pedis pulses are 2+ on the right side and 2+ on the left side.        Posterior tibial pulses are 2+ on the right side and 2+ on the left side.   Abdominal:      Comments: Abdominal obesity no lipodystrophy   Musculoskeletal:         General: No swelling or tenderness.   Feet:      Comments: Dry skin with non discrimination between sharp and dull sensation   Neurological:      General: No focal deficit present.      Mental Status: She is alert and oriented to person, place, and time.   Psychiatric:         Mood and Affect: Mood normal.         Behavior: Behavior normal.         labs and imaging reviewed, pertinent findings listed on HPI and Impression    Problem List Items Addressed This Visit       Type 2 diabetes mellitus with hyperglycemia, with long-term current use of insulin (CMS/[facility]) - Primary    Relevant Medications    semaglutide 2 mg/dose (8 mg/3 mL) pen injector    dapagliflozin propanediol (Farxiga) 10 mg    insulin glargine  (Lantus) 100 unit/mL (3 mL) pen    ammonium lactate (Lac-Hydrin) 12 % lotion    Other Relevant Orders    Hemoglobin A1C    Renal Function Panel    Albumin , Urine Random     CGM reviewed, minimum of 72 hrs of data reviewed, CGM data reviewed to influence glucose treatment plan: 77% in range 22% high no lows     Iss 2:50 >200  Lantus 15  Continue farxiga   Continue ozempic   Lac hydrin lotion to soles of feet     Labs prior to next visit  Foot exam done today     Follow up in 5-6 months

## 2023-12-12 ENCOUNTER — SPECIALTY PHARMACY (OUTPATIENT)
Dept: PHARMACY | Facility: CLINIC | Age: 73
End: 2023-12-12

## 2023-12-12 PROCEDURE — RXMED WILLOW AMBULATORY MEDICATION CHARGE

## 2023-12-13 ENCOUNTER — PHARMACY VISIT (OUTPATIENT)
Dept: PHARMACY | Facility: CLINIC | Age: 73
End: 2023-12-13
Payer: COMMERCIAL

## 2023-12-20 ENCOUNTER — SPECIALTY PHARMACY (OUTPATIENT)
Dept: PHARMACY | Facility: CLINIC | Age: 73
End: 2023-12-20

## 2023-12-20 PROCEDURE — RXMED WILLOW AMBULATORY MEDICATION CHARGE

## 2023-12-21 ENCOUNTER — PHARMACY VISIT (OUTPATIENT)
Dept: PHARMACY | Facility: CLINIC | Age: 73
End: 2023-12-21
Payer: COMMERCIAL

## 2024-01-10 DIAGNOSIS — E11.65 TYPE 2 DIABETES MELLITUS WITH HYPERGLYCEMIA, WITH LONG-TERM CURRENT USE OF INSULIN (MULTI): ICD-10-CM

## 2024-01-10 DIAGNOSIS — Z79.4 TYPE 2 DIABETES MELLITUS WITH HYPERGLYCEMIA, WITH LONG-TERM CURRENT USE OF INSULIN (MULTI): ICD-10-CM

## 2024-01-11 ENCOUNTER — LAB (OUTPATIENT)
Dept: LAB | Facility: LAB | Age: 74
End: 2024-01-11
Payer: COMMERCIAL

## 2024-01-11 DIAGNOSIS — E11.65 TYPE 2 DIABETES MELLITUS WITH HYPERGLYCEMIA, WITH LONG-TERM CURRENT USE OF INSULIN (MULTI): ICD-10-CM

## 2024-01-11 DIAGNOSIS — Z79.4 TYPE 2 DIABETES MELLITUS WITH HYPERGLYCEMIA, WITH LONG-TERM CURRENT USE OF INSULIN (MULTI): ICD-10-CM

## 2024-01-11 DIAGNOSIS — Z94.0 KIDNEY REPLACED BY TRANSPLANT (HHS-HCC): ICD-10-CM

## 2024-01-11 LAB
ALBUMIN SERPL BCP-MCNC: 3.9 G/DL (ref 3.4–5)
ANION GAP SERPL CALC-SCNC: 14 MMOL/L (ref 10–20)
BUN SERPL-MCNC: 17 MG/DL (ref 6–23)
CALCIUM SERPL-MCNC: 9.8 MG/DL (ref 8.6–10.3)
CHLORIDE SERPL-SCNC: 102 MMOL/L (ref 98–107)
CO2 SERPL-SCNC: 26 MMOL/L (ref 21–32)
CREAT SERPL-MCNC: 0.99 MG/DL (ref 0.5–1.05)
CREAT UR-MCNC: 55.6 MG/DL (ref 20–320)
EGFRCR SERPLBLD CKD-EPI 2021: 61 ML/MIN/1.73M*2
ERYTHROCYTE [DISTWIDTH] IN BLOOD BY AUTOMATED COUNT: 13.4 % (ref 11.5–14.5)
EST. AVERAGE GLUCOSE BLD GHB EST-MCNC: 154 MG/DL
GLUCOSE SERPL-MCNC: 139 MG/DL (ref 74–99)
HBA1C MFR BLD: 7 %
HCT VFR BLD AUTO: 50.5 % (ref 36–46)
HGB BLD-MCNC: 15.2 G/DL (ref 12–16)
MAGNESIUM SERPL-MCNC: 1.8 MG/DL (ref 1.6–2.4)
MCH RBC QN AUTO: 27.8 PG (ref 26–34)
MCHC RBC AUTO-ENTMCNC: 30.1 G/DL (ref 32–36)
MCV RBC AUTO: 92 FL (ref 80–100)
NRBC BLD-RTO: 0 /100 WBCS (ref 0–0)
PHOSPHATE SERPL-MCNC: 3.3 MG/DL (ref 2.5–4.9)
PLATELET # BLD AUTO: 188 X10*3/UL (ref 150–450)
POTASSIUM SERPL-SCNC: 4.3 MMOL/L (ref 3.5–5.3)
PROT UR-ACNC: 6 MG/DL (ref 5–24)
PROT/CREAT UR: 0.11 MG/MG CREAT (ref 0–0.17)
RBC # BLD AUTO: 5.47 X10*6/UL (ref 4–5.2)
SODIUM SERPL-SCNC: 138 MMOL/L (ref 136–145)
TACROLIMUS BLD-MCNC: 7.9 NG/ML
WBC # BLD AUTO: 3.3 X10*3/UL (ref 4.4–11.3)

## 2024-01-11 PROCEDURE — 82043 UR ALBUMIN QUANTITATIVE: CPT

## 2024-01-11 PROCEDURE — 82570 ASSAY OF URINE CREATININE: CPT

## 2024-01-11 PROCEDURE — 80069 RENAL FUNCTION PANEL: CPT

## 2024-01-11 PROCEDURE — 36415 COLL VENOUS BLD VENIPUNCTURE: CPT

## 2024-01-11 PROCEDURE — 85027 COMPLETE CBC AUTOMATED: CPT

## 2024-01-11 PROCEDURE — 83036 HEMOGLOBIN GLYCOSYLATED A1C: CPT

## 2024-01-11 PROCEDURE — 84156 ASSAY OF PROTEIN URINE: CPT

## 2024-01-11 PROCEDURE — 83735 ASSAY OF MAGNESIUM: CPT

## 2024-01-11 PROCEDURE — 80197 ASSAY OF TACROLIMUS: CPT

## 2024-01-12 ENCOUNTER — TELEPHONE (OUTPATIENT)
Dept: TRANSPLANT | Facility: HOSPITAL | Age: 74
End: 2024-01-12

## 2024-01-12 ENCOUNTER — OFFICE VISIT (OUTPATIENT)
Dept: OPHTHALMOLOGY | Facility: CLINIC | Age: 74
End: 2024-01-12
Payer: COMMERCIAL

## 2024-01-12 DIAGNOSIS — H35.09 RETINAL VASCULAR LESION: Primary | ICD-10-CM

## 2024-01-12 LAB
CREAT UR-MCNC: 54.9 MG/DL (ref 20–320)
MICROALBUMIN UR-MCNC: 16.3 MG/L
MICROALBUMIN/CREAT UR: 29.7 UG/MG CREAT

## 2024-01-12 PROCEDURE — 99214 OFFICE O/P EST MOD 30 MIN: CPT | Performed by: OPHTHALMOLOGY

## 2024-01-12 PROCEDURE — 92134 CPTRZ OPH DX IMG PST SGM RTA: CPT | Mod: BILATERAL PROCEDURE | Performed by: OPHTHALMOLOGY

## 2024-01-12 RX ORDER — LANOLIN ALCOHOL/MO/W.PET/CERES
400 CREAM (GRAM) TOPICAL DAILY
Status: CANCELLED | OUTPATIENT
Start: 2024-01-12

## 2024-01-12 RX ORDER — ACETAMINOPHEN 325 MG/1
TABLET ORAL EVERY 6 HOURS PRN
COMMUNITY
Start: 2022-05-20 | End: 2024-05-23 | Stop reason: WASHOUT

## 2024-01-12 RX ORDER — HYDRALAZINE HYDROCHLORIDE 100 MG/1
100 TABLET, FILM COATED ORAL 3 TIMES DAILY
Status: CANCELLED | OUTPATIENT
Start: 2024-01-12

## 2024-01-12 RX ORDER — TELMISARTAN 80 MG/1
TABLET ORAL
COMMUNITY
Start: 2017-02-02 | End: 2024-05-05 | Stop reason: WASHOUT

## 2024-01-12 RX ORDER — ASPIRIN 325 MG
TABLET, DELAYED RELEASE (ENTERIC COATED) ORAL
Qty: 30 TABLET | Refills: 11 | Status: CANCELLED | OUTPATIENT
Start: 2024-01-12 | End: 2025-01-11

## 2024-01-12 RX ORDER — ALLOPURINOL 100 MG/1
TABLET ORAL
COMMUNITY
Start: 2016-02-19 | End: 2024-05-05 | Stop reason: WASHOUT

## 2024-01-12 RX ORDER — ATORVASTATIN CALCIUM 20 MG/1
TABLET, FILM COATED ORAL
COMMUNITY
Start: 2022-08-19 | End: 2024-04-11 | Stop reason: SDUPTHER

## 2024-01-12 RX ORDER — VALGANCICLOVIR 450 MG/1
900 TABLET, FILM COATED ORAL 2 TIMES DAILY
COMMUNITY
Start: 2023-07-01 | End: 2024-05-09 | Stop reason: HOSPADM

## 2024-01-12 RX ORDER — FLUTICASONE PROPIONATE 50 MCG
1 SPRAY, SUSPENSION (ML) NASAL DAILY PRN
COMMUNITY
Start: 2020-01-28 | End: 2024-05-23 | Stop reason: WASHOUT

## 2024-01-12 ASSESSMENT — REFRACTION_WEARINGRX
OD_SPHERE: +1.00
OD_AXIS: 080
OD_ADD: +2.50
OS_AXIS: 090
OD_CYLINDER: -1.00
OS_SPHERE: +1.75
OS_CYLINDER: -1.75
OS_ADD: +2.50

## 2024-01-12 ASSESSMENT — SLIT LAMP EXAM - LIDS
COMMENTS: GOOD POSITION
COMMENTS: GOOD POSITION

## 2024-01-12 ASSESSMENT — ENCOUNTER SYMPTOMS
NEUROLOGICAL NEGATIVE: 0
ROS GI COMMENTS: HEP C
MUSCULOSKELETAL NEGATIVE: 0
CONSTITUTIONAL NEGATIVE: 0
RESPIRATORY NEGATIVE: 0
EYES NEGATIVE: 0
ALLERGIC/IMMUNOLOGIC NEGATIVE: 0
CARDIOVASCULAR NEGATIVE: 0
HEMATOLOGIC/LYMPHATIC NEGATIVE: 0
ENDOCRINE NEGATIVE: 0
GASTROINTESTINAL NEGATIVE: 0
PSYCHIATRIC NEGATIVE: 0

## 2024-01-12 ASSESSMENT — VISUAL ACUITY
OS_CC+: -3
OD_PH_CC: 20/25
OD_CC: 20/40
OD_PH_CC+: -2
METHOD: SNELLEN - LINEAR
CORRECTION_TYPE: GLASSES
OS_CC: 20/25

## 2024-01-12 ASSESSMENT — TONOMETRY
OS_IOP_MMHG: 17
OD_IOP_MMHG: 15
IOP_METHOD: GOLDMANN APPLANATION

## 2024-01-12 ASSESSMENT — CONF VISUAL FIELD
OS_INFERIOR_TEMPORAL_RESTRICTION: 0
OS_SUPERIOR_NASAL_RESTRICTION: 0
OS_SUPERIOR_TEMPORAL_RESTRICTION: 0
OS_INFERIOR_NASAL_RESTRICTION: 0

## 2024-01-12 ASSESSMENT — CUP TO DISC RATIO
OS_RATIO: .3
OD_RATIO: .3

## 2024-01-12 ASSESSMENT — EXTERNAL EXAM - LEFT EYE: OS_EXAM: NORMAL

## 2024-01-12 ASSESSMENT — EXTERNAL EXAM - RIGHT EYE: OD_EXAM: NORMAL

## 2024-01-12 NOTE — TELEPHONE ENCOUNTER
Labs reviewed with Dr. Tobias  WBC: 3.3 (3.6, 4.1) MMF 3    CMV: 36 (<35, NQ) tac 7.9 on 1.5  PLAN decrease MMF to 500  Pt was already taking MMF at 500 mg daily, monitor labs

## 2024-01-12 NOTE — PROGRESS NOTES
Impression    1 E11.3293 Diabetes mellitus with both eyes affected by mild nonproliferative retinopathy without macular edema-Improving  2 E11.3219 Background diabetic macular edema with mild nonproliferative retinopathy associated with type 2 diabetes mellitus-Stable  3 H25.813 Combined form of age-related cataract, both eyes-Worsening  4 H04.123 Dry eye syndrome of both eyes-Stable  5 H02.89 Meibomian gland dysfunction (mgd) of both eyes-Stable  6 H52.00 Hyperopia-Stable  7 H52.209 Astigmatism-Stable  8 H52.4 Presbyopia-Stable  9 H35.82 Ischemia of retina of both eyes-Stable         Discussion    thi spatient has not been tested for sickle cell  it may be worthwhile to test in future as well as repeat FA  in 6 months  she had Hep C from donor that may explain issues retina      today no DME OU         Hi quality OCT  scans obtained  signal good    OCT OD - Normal Foveal Contour, No Edema, IS/OS Junction Normal  OCT OS - Normal Foveal Contour, No Edema, IS/OS Junction Normal    additional commnents:        Plan            TODAY   (OU)   - OCT Macula By:Nick Campoverde  History  Retinal vascular [id number]  - Fibrovascular stalk extending into vitreous at 3:00 near equator in the left eye  - Possibly NVE but associated with additional tissue not typical of diabetic NVE or vitreous traction  - Recommend evaluation with Dr. Echegaray    Diabetes mellitus with both eyes affected by mild nonproliferative retinopathy without macular [id number]  - The patient has diabetes with evidence of retinopathy.    - The patient was advised to maintain tight glucose control, tight blood pressure control, and favorable levels of cholesterol, low density lipoprotein, and high density lipoproteins.      Combined form of age-related cataract, both eyesH25.813  See Dr Shea          Peripheral ischemia OS with NVE   schemia with vascular proliferation at 3 o clock. Per pt no hx of sickle cell or sickle cell trait in herself or  family. hx DM but retinopathy overall mild.   Prior FA showed peripheral nonperufsion with vascular remodeling.       Good laser uptake and coverage OU     She has had enough diabetic disease to warrant a renal transplant. In this situation, probably wise to consider diabetes itself as the  of the ischemia.

## 2024-01-12 NOTE — TELEPHONE ENCOUNTER
Pt called requesting refill of ASA 325mg, magnesium oxide 400mg, and hydralazine 100mg. Orders pended and routed to provider.

## 2024-01-16 ENCOUNTER — TELEPHONE (OUTPATIENT)
Dept: TRANSPLANT | Facility: HOSPITAL | Age: 74
End: 2024-01-16
Payer: COMMERCIAL

## 2024-01-16 DIAGNOSIS — I15.9 SECONDARY HYPERTENSION: ICD-10-CM

## 2024-01-16 DIAGNOSIS — R79.0 LOW MAGNESIUM LEVEL: ICD-10-CM

## 2024-01-16 DIAGNOSIS — Z94.0 KIDNEY REPLACED BY TRANSPLANT (HHS-HCC): ICD-10-CM

## 2024-01-16 RX ORDER — ASPIRIN 325 MG
325 TABLET, DELAYED RELEASE (ENTERIC COATED) ORAL DAILY
Qty: 90 TABLET | Refills: 3 | Status: SHIPPED | OUTPATIENT
Start: 2024-01-16 | End: 2025-01-15

## 2024-01-16 RX ORDER — LANOLIN ALCOHOL/MO/W.PET/CERES
400 CREAM (GRAM) TOPICAL DAILY
Qty: 90 TABLET | Refills: 3 | Status: SHIPPED | OUTPATIENT
Start: 2024-01-16 | End: 2025-01-15

## 2024-01-16 RX ORDER — HYDRALAZINE HYDROCHLORIDE 100 MG/1
100 TABLET, FILM COATED ORAL 3 TIMES DAILY
Qty: 270 TABLET | Refills: 3 | Status: SHIPPED | OUTPATIENT
Start: 2024-01-16 | End: 2025-01-15

## 2024-01-16 NOTE — TELEPHONE ENCOUNTER
VM from 1/10 at 5:19 pm - she needs Rxs for Aspirin 325 mg, MgOxide 400 mg and Hydralazine 100 mg. Her number is [phone]

## 2024-01-16 NOTE — TELEPHONE ENCOUNTER
Returned call to patient, verified meds needing refilled with dosing  Sent to Dr. Oviedo to be filled.

## 2024-01-18 ENCOUNTER — TELEPHONE (OUTPATIENT)
Dept: TRANSPLANT | Facility: HOSPITAL | Age: 74
End: 2024-01-18
Payer: COMMERCIAL

## 2024-01-18 NOTE — TELEPHONE ENCOUNTER
VM on 1/12/24 at 10:00 am  - PT needs Aspirin 325 and Hydralazine 100 mg. Has been without for a week. [phone]

## 2024-01-22 ENCOUNTER — TELEPHONE (OUTPATIENT)
Dept: TRANSPLANT | Facility: HOSPITAL | Age: 74
End: 2024-01-22
Payer: COMMERCIAL

## 2024-01-22 DIAGNOSIS — Z94.0 KIDNEY REPLACED BY TRANSPLANT (HHS-HCC): ICD-10-CM

## 2024-01-22 RX ORDER — TACROLIMUS 1 MG/1
1 CAPSULE ORAL EVERY 12 HOURS
Qty: 180 CAPSULE | Refills: 3 | Status: SHIPPED | OUTPATIENT
Start: 2024-01-22 | End: 2024-01-29 | Stop reason: SDUPTHER

## 2024-01-22 RX ORDER — PREDNISONE 5 MG/1
5 TABLET ORAL DAILY
Qty: 90 TABLET | Refills: 3 | Status: SHIPPED | OUTPATIENT
Start: 2024-01-22 | End: 2024-02-21 | Stop reason: SDUPTHER

## 2024-01-22 RX ORDER — TACROLIMUS 0.5 MG/1
0.5 CAPSULE ORAL 2 TIMES DAILY
Qty: 180 CAPSULE | Refills: 3 | Status: SHIPPED | OUTPATIENT
Start: 2024-01-22 | End: 2024-01-29 | Stop reason: SDUPTHER

## 2024-01-26 ENCOUNTER — TELEPHONE (OUTPATIENT)
Dept: TRANSPLANT | Facility: HOSPITAL | Age: 74
End: 2024-01-26
Payer: COMMERCIAL

## 2024-01-26 DIAGNOSIS — Z94.0 KIDNEY REPLACED BY TRANSPLANT (HHS-HCC): ICD-10-CM

## 2024-01-29 RX ORDER — TACROLIMUS 1 MG/1
1 CAPSULE ORAL EVERY 12 HOURS
Qty: 180 CAPSULE | Refills: 3 | Status: SHIPPED | OUTPATIENT
Start: 2024-01-29 | End: 2024-05-09 | Stop reason: HOSPADM

## 2024-01-29 RX ORDER — TACROLIMUS 0.5 MG/1
0.5 CAPSULE ORAL 2 TIMES DAILY
Qty: 180 CAPSULE | Refills: 3 | Status: SHIPPED | OUTPATIENT
Start: 2024-01-29 | End: 2024-05-09 | Stop reason: HOSPADM

## 2024-02-02 ENCOUNTER — LAB (OUTPATIENT)
Dept: LAB | Facility: LAB | Age: 74
End: 2024-02-02
Payer: COMMERCIAL

## 2024-02-02 DIAGNOSIS — Z94.0 KIDNEY REPLACED BY TRANSPLANT (HHS-HCC): ICD-10-CM

## 2024-02-02 DIAGNOSIS — Z94.0 KIDNEY TRANSPLANT STATUS (HHS-HCC): Primary | ICD-10-CM

## 2024-02-02 DIAGNOSIS — Z00.5 ENCOUNTER FOR EXAMINATION OF POTENTIAL DONOR OF ORGAN AND TISSUE: ICD-10-CM

## 2024-02-02 LAB
ALBUMIN SERPL BCP-MCNC: 3.7 G/DL (ref 3.4–5)
ALP SERPL-CCNC: 89 U/L (ref 33–136)
ALT SERPL W P-5'-P-CCNC: 12 U/L (ref 7–45)
ANION GAP SERPL CALC-SCNC: 13 MMOL/L (ref 10–20)
AST SERPL W P-5'-P-CCNC: 17 U/L (ref 9–39)
BILIRUB DIRECT SERPL-MCNC: 0.1 MG/DL (ref 0–0.3)
BILIRUB SERPL-MCNC: 0.5 MG/DL (ref 0–1.2)
BUN SERPL-MCNC: 20 MG/DL (ref 6–23)
CALCIUM SERPL-MCNC: 9.7 MG/DL (ref 8.6–10.3)
CHLORIDE SERPL-SCNC: 103 MMOL/L (ref 98–107)
CO2 SERPL-SCNC: 24 MMOL/L (ref 21–32)
CREAT SERPL-MCNC: 1.02 MG/DL (ref 0.5–1.05)
CREAT UR-MCNC: 105.7 MG/DL (ref 20–320)
EGFRCR SERPLBLD CKD-EPI 2021: 59 ML/MIN/1.73M*2
ERYTHROCYTE [DISTWIDTH] IN BLOOD BY AUTOMATED COUNT: 13.7 % (ref 11.5–14.5)
GLUCOSE SERPL-MCNC: 114 MG/DL (ref 74–99)
HCT VFR BLD AUTO: 45.2 % (ref 36–46)
HGB BLD-MCNC: 14.2 G/DL (ref 12–16)
MAGNESIUM SERPL-MCNC: 1.7 MG/DL (ref 1.6–2.4)
MCH RBC QN AUTO: 28 PG (ref 26–34)
MCHC RBC AUTO-ENTMCNC: 31.4 G/DL (ref 32–36)
MCV RBC AUTO: 89 FL (ref 80–100)
NRBC BLD-RTO: 0 /100 WBCS (ref 0–0)
PHOSPHATE SERPL-MCNC: 3.2 MG/DL (ref 2.5–4.9)
PLATELET # BLD AUTO: 203 X10*3/UL (ref 150–450)
POTASSIUM SERPL-SCNC: 4.4 MMOL/L (ref 3.5–5.3)
PROT SERPL-MCNC: 6.1 G/DL (ref 6.4–8.2)
PROT UR-ACNC: 13 MG/DL (ref 5–24)
PROT/CREAT UR: 0.12 MG/MG CREAT (ref 0–0.17)
RBC # BLD AUTO: 5.07 X10*6/UL (ref 4–5.2)
SODIUM SERPL-SCNC: 136 MMOL/L (ref 136–145)
TACROLIMUS BLD-MCNC: 6.5 NG/ML
WBC # BLD AUTO: 3.1 X10*3/UL (ref 4.4–11.3)

## 2024-02-02 PROCEDURE — 80197 ASSAY OF TACROLIMUS: CPT

## 2024-02-02 PROCEDURE — 87799 DETECT AGENT NOS DNA QUANT: CPT

## 2024-02-02 PROCEDURE — 36415 COLL VENOUS BLD VENIPUNCTURE: CPT

## 2024-02-02 PROCEDURE — 84156 ASSAY OF PROTEIN URINE: CPT

## 2024-02-02 PROCEDURE — 85027 COMPLETE CBC AUTOMATED: CPT

## 2024-02-02 PROCEDURE — 83735 ASSAY OF MAGNESIUM: CPT

## 2024-02-02 PROCEDURE — 82248 BILIRUBIN DIRECT: CPT

## 2024-02-02 PROCEDURE — 80053 COMPREHEN METABOLIC PANEL: CPT

## 2024-02-02 PROCEDURE — 84100 ASSAY OF PHOSPHORUS: CPT

## 2024-02-02 PROCEDURE — 82570 ASSAY OF URINE CREATININE: CPT

## 2024-02-04 LAB
BKV DNA SERPL NAA+PROBE-LOG#: NORMAL {LOG_COPIES}/ML
LABORATORY COMMENT REPORT: NOT DETECTED

## 2024-02-12 ENCOUNTER — TELEPHONE (OUTPATIENT)
Dept: TRANSPLANT | Facility: HOSPITAL | Age: 74
End: 2024-02-12
Payer: COMMERCIAL

## 2024-02-12 DIAGNOSIS — Z94.0 KIDNEY REPLACED BY TRANSPLANT (HHS-HCC): ICD-10-CM

## 2024-02-12 RX ORDER — MYCOPHENOLATE MOFETIL 250 MG/1
750 CAPSULE ORAL 2 TIMES DAILY
Qty: 540 CAPSULE | Refills: 3 | Status: SHIPPED | OUTPATIENT
Start: 2024-02-12 | End: 2024-05-09 | Stop reason: HOSPADM

## 2024-02-20 ENCOUNTER — TELEPHONE (OUTPATIENT)
Dept: TRANSPLANT | Facility: HOSPITAL | Age: 74
End: 2024-02-20
Payer: COMMERCIAL

## 2024-02-20 DIAGNOSIS — Z94.0 KIDNEY REPLACED BY TRANSPLANT (HHS-HCC): ICD-10-CM

## 2024-02-21 RX ORDER — PREDNISONE 5 MG/1
5 TABLET ORAL DAILY
Qty: 90 TABLET | Refills: 3 | Status: SHIPPED | OUTPATIENT
Start: 2024-02-21 | End: 2025-02-20

## 2024-02-21 NOTE — TELEPHONE ENCOUNTER
Return phone call to patient. Refill sent for Prednisone to [facility] per patient request. Script pending Dr. Salazar signature.

## 2024-03-11 ENCOUNTER — LAB (OUTPATIENT)
Dept: LAB | Facility: LAB | Age: 74
End: 2024-03-11
Payer: COMMERCIAL

## 2024-03-11 DIAGNOSIS — Z94.0 KIDNEY REPLACED BY TRANSPLANT (HHS-HCC): ICD-10-CM

## 2024-03-11 DIAGNOSIS — Z94.0 KIDNEY TRANSPLANT STATUS (HHS-HCC): Primary | ICD-10-CM

## 2024-03-11 DIAGNOSIS — Z00.5 ENCOUNTER FOR EXAMINATION OF POTENTIAL DONOR OF ORGAN AND TISSUE: ICD-10-CM

## 2024-03-11 LAB
ALBUMIN SERPL BCP-MCNC: 4 G/DL (ref 3.4–5)
ALP SERPL-CCNC: 100 U/L (ref 33–136)
ALT SERPL W P-5'-P-CCNC: 10 U/L (ref 7–45)
ANION GAP SERPL CALC-SCNC: 13 MMOL/L (ref 10–20)
AST SERPL W P-5'-P-CCNC: 14 U/L (ref 9–39)
BILIRUB DIRECT SERPL-MCNC: 0.1 MG/DL (ref 0–0.3)
BILIRUB SERPL-MCNC: 0.5 MG/DL (ref 0–1.2)
BUN SERPL-MCNC: 16 MG/DL (ref 6–23)
CALCIUM SERPL-MCNC: 10.2 MG/DL (ref 8.6–10.3)
CHLORIDE SERPL-SCNC: 102 MMOL/L (ref 98–107)
CO2 SERPL-SCNC: 23 MMOL/L (ref 21–32)
CREAT SERPL-MCNC: 0.98 MG/DL (ref 0.5–1.05)
CREAT UR-MCNC: 17.6 MG/DL (ref 20–320)
EGFRCR SERPLBLD CKD-EPI 2021: 62 ML/MIN/1.73M*2
ERYTHROCYTE [DISTWIDTH] IN BLOOD BY AUTOMATED COUNT: 13.4 % (ref 11.5–14.5)
GLUCOSE SERPL-MCNC: 136 MG/DL (ref 74–99)
HCT VFR BLD AUTO: 46.3 % (ref 36–46)
HGB BLD-MCNC: 14.2 G/DL (ref 12–16)
MAGNESIUM SERPL-MCNC: 1.6 MG/DL (ref 1.6–2.4)
MCH RBC QN AUTO: 27.4 PG (ref 26–34)
MCHC RBC AUTO-ENTMCNC: 30.7 G/DL (ref 32–36)
MCV RBC AUTO: 89 FL (ref 80–100)
NRBC BLD-RTO: 0 /100 WBCS (ref 0–0)
PHOSPHATE SERPL-MCNC: 3.4 MG/DL (ref 2.5–4.9)
PLATELET # BLD AUTO: 214 X10*3/UL (ref 150–450)
POTASSIUM SERPL-SCNC: 4.4 MMOL/L (ref 3.5–5.3)
PROT SERPL-MCNC: 6.2 G/DL (ref 6.4–8.2)
PROT UR-ACNC: <4 MG/DL (ref 5–24)
PROT/CREAT UR: ABNORMAL MG/G{CREAT}
RBC # BLD AUTO: 5.18 X10*6/UL (ref 4–5.2)
SODIUM SERPL-SCNC: 134 MMOL/L (ref 136–145)
TACROLIMUS BLD-MCNC: 9.2 NG/ML
WBC # BLD AUTO: 4.1 X10*3/UL (ref 4.4–11.3)

## 2024-03-11 PROCEDURE — 82570 ASSAY OF URINE CREATININE: CPT

## 2024-03-11 PROCEDURE — 85027 COMPLETE CBC AUTOMATED: CPT

## 2024-03-11 PROCEDURE — 82248 BILIRUBIN DIRECT: CPT

## 2024-03-11 PROCEDURE — 84100 ASSAY OF PHOSPHORUS: CPT

## 2024-03-11 PROCEDURE — 80197 ASSAY OF TACROLIMUS: CPT

## 2024-03-11 PROCEDURE — 87799 DETECT AGENT NOS DNA QUANT: CPT

## 2024-03-11 PROCEDURE — 36415 COLL VENOUS BLD VENIPUNCTURE: CPT

## 2024-03-11 PROCEDURE — 80053 COMPREHEN METABOLIC PANEL: CPT

## 2024-03-11 PROCEDURE — 84156 ASSAY OF PROTEIN URINE: CPT

## 2024-03-11 PROCEDURE — 83735 ASSAY OF MAGNESIUM: CPT

## 2024-03-12 LAB
BKV DNA SERPL NAA+PROBE-LOG#: NORMAL {LOG_COPIES}/ML
LABORATORY COMMENT REPORT: NOT DETECTED

## 2024-03-14 DIAGNOSIS — B19.10 HBV POSITIVE IN CADAVERIC DONOR: Primary | ICD-10-CM

## 2024-03-14 DIAGNOSIS — Z00.5 HBV POSITIVE IN CADAVERIC DONOR: Primary | ICD-10-CM

## 2024-03-14 LAB
ALLOSURE SCORE - KIDNEY: 0.09 %
CAREDX_ORDER_ID: NORMAL
CENTER_ORDER_ID: NORMAL
CLIENT SPECIMEN ID - ALLOSURE: NORMAL
DONOR RELATION - ALLOSURE: NORMAL
NOTES - ALLOSURE: NORMAL
RELATIVE CHANGE VALUE - KIDNEY: NORMAL %
TEST COMMENTS - ALLOSURE: NORMAL
TIME POST TX - ALLOSURE: NORMAL
TRANSPLANTED ORGAN - ALLOSURE: NORMAL
TX DATE - ALLOSURE/ALLOMAP: NORMAL
WP_ORDER_ID: NORMAL

## 2024-03-18 ENCOUNTER — APPOINTMENT (OUTPATIENT)
Dept: PRIMARY CARE | Facility: CLINIC | Age: 74
End: 2024-03-18
Payer: COMMERCIAL

## 2024-03-18 ENCOUNTER — TELEPHONE (OUTPATIENT)
Dept: TRANSPLANT | Facility: HOSPITAL | Age: 74
End: 2024-03-18

## 2024-03-18 DIAGNOSIS — Z94.0 KIDNEY REPLACED BY TRANSPLANT (HHS-HCC): ICD-10-CM

## 2024-03-18 RX ORDER — CARVEDILOL 25 MG/1
50 TABLET ORAL 2 TIMES DAILY
Qty: 120 TABLET | Refills: 11 | Status: SHIPPED | OUTPATIENT
Start: 2024-03-18 | End: 2025-03-18

## 2024-04-08 ENCOUNTER — LAB (OUTPATIENT)
Dept: LAB | Facility: LAB | Age: 74
End: 2024-04-08
Payer: COMMERCIAL

## 2024-04-08 ENCOUNTER — TELEPHONE (OUTPATIENT)
Dept: TRANSPLANT | Facility: HOSPITAL | Age: 74
End: 2024-04-08

## 2024-04-08 DIAGNOSIS — Z00.5 ENCOUNTER FOR EXAMINATION OF POTENTIAL DONOR OF ORGAN AND TISSUE: ICD-10-CM

## 2024-04-08 DIAGNOSIS — Z94.0 KIDNEY TRANSPLANT STATUS (HHS-HCC): ICD-10-CM

## 2024-04-08 DIAGNOSIS — B25.9 CYTOMEGALOVIRUS (CMV) VIREMIA (MULTI): ICD-10-CM

## 2024-04-08 DIAGNOSIS — Z94.0 KIDNEY REPLACED BY TRANSPLANT (HHS-HCC): ICD-10-CM

## 2024-04-08 LAB
ALBUMIN SERPL BCP-MCNC: 4.4 G/DL (ref 3.4–5)
ALP SERPL-CCNC: 105 U/L (ref 33–136)
ALT SERPL W P-5'-P-CCNC: 9 U/L (ref 7–45)
AST SERPL W P-5'-P-CCNC: 15 U/L (ref 9–39)
BILIRUB DIRECT SERPL-MCNC: 0.1 MG/DL (ref 0–0.3)
BILIRUB SERPL-MCNC: 0.6 MG/DL (ref 0–1.2)
ERYTHROCYTE [DISTWIDTH] IN BLOOD BY AUTOMATED COUNT: 13.2 % (ref 11.5–14.5)
HCT VFR BLD AUTO: 46.5 % (ref 36–46)
HGB BLD-MCNC: 14.3 G/DL (ref 12–16)
MAGNESIUM SERPL-MCNC: 1.5 MG/DL (ref 1.6–2.4)
MCH RBC QN AUTO: 27.6 PG (ref 26–34)
MCHC RBC AUTO-ENTMCNC: 30.8 G/DL (ref 32–36)
MCV RBC AUTO: 90 FL (ref 80–100)
NRBC BLD-RTO: 0 /100 WBCS (ref 0–0)
PLATELET # BLD AUTO: 216 X10*3/UL (ref 150–450)
PROT SERPL-MCNC: 6.8 G/DL (ref 6.4–8.2)
RBC # BLD AUTO: 5.19 X10*6/UL (ref 4–5.2)
TACROLIMUS BLD-MCNC: 10.9 NG/ML
WBC # BLD AUTO: 3.2 X10*3/UL (ref 4.4–11.3)

## 2024-04-08 PROCEDURE — 85027 COMPLETE CBC AUTOMATED: CPT

## 2024-04-08 PROCEDURE — 83735 ASSAY OF MAGNESIUM: CPT

## 2024-04-08 PROCEDURE — 80076 HEPATIC FUNCTION PANEL: CPT

## 2024-04-08 PROCEDURE — 87799 DETECT AGENT NOS DNA QUANT: CPT

## 2024-04-08 PROCEDURE — 80197 ASSAY OF TACROLIMUS: CPT

## 2024-04-08 PROCEDURE — 36415 COLL VENOUS BLD VENIPUNCTURE: CPT

## 2024-04-08 NOTE — TELEPHONE ENCOUNTER
Returned call to patient, we do not manage lovastatin, already ordered as well.   Patient will call ordering provider.

## 2024-04-09 LAB
BKV DNA SERPL NAA+PROBE-LOG#: NORMAL {LOG_COPIES}/ML
CMV DNA SERPL NAA+PROBE-LOG IU: NORMAL {LOG_IU}/ML
LABORATORY COMMENT REPORT: NOT DETECTED
LABORATORY COMMENT REPORT: NOT DETECTED

## 2024-04-11 DIAGNOSIS — E78.5 HYPERLIPIDEMIA, UNSPECIFIED HYPERLIPIDEMIA TYPE: Primary | ICD-10-CM

## 2024-04-11 RX ORDER — ATORVASTATIN CALCIUM 20 MG/1
20 TABLET, FILM COATED ORAL
Qty: 90 TABLET | Refills: 3 | Status: SHIPPED | OUTPATIENT
Start: 2024-04-11 | End: 2024-04-18 | Stop reason: WASHOUT

## 2024-04-15 DIAGNOSIS — I70.8 ATHEROSCLEROSIS OF AORTIC BIFURCATION AND COMMON ILIAC ARTERIES (CMS-HCC): ICD-10-CM

## 2024-04-15 DIAGNOSIS — I70.0 ATHEROSCLEROSIS OF AORTIC BIFURCATION AND COMMON ILIAC ARTERIES (CMS-HCC): ICD-10-CM

## 2024-04-15 RX ORDER — LOVASTATIN 40 MG/1
40 TABLET ORAL NIGHTLY
Qty: 30 TABLET | Refills: 0 | Status: CANCELLED | OUTPATIENT
Start: 2024-04-15

## 2024-04-18 ENCOUNTER — APPOINTMENT (OUTPATIENT)
Dept: PRIMARY CARE | Facility: CLINIC | Age: 74
End: 2024-04-18
Payer: COMMERCIAL

## 2024-04-18 DIAGNOSIS — Z79.4 TYPE 2 DIABETES MELLITUS WITH HYPERGLYCEMIA, WITH LONG-TERM CURRENT USE OF INSULIN (MULTI): ICD-10-CM

## 2024-04-18 DIAGNOSIS — E11.65 TYPE 2 DIABETES MELLITUS WITH HYPERGLYCEMIA, WITH LONG-TERM CURRENT USE OF INSULIN (MULTI): ICD-10-CM

## 2024-04-18 DIAGNOSIS — I70.0 ATHEROSCLEROSIS OF AORTIC BIFURCATION AND COMMON ILIAC ARTERIES (CMS-HCC): ICD-10-CM

## 2024-04-18 DIAGNOSIS — I70.8 ATHEROSCLEROSIS OF AORTIC BIFURCATION AND COMMON ILIAC ARTERIES (CMS-HCC): ICD-10-CM

## 2024-04-18 RX ORDER — LOVASTATIN 40 MG/1
40 TABLET ORAL NIGHTLY
Qty: 90 TABLET | Refills: 3 | Status: SHIPPED | OUTPATIENT
Start: 2024-04-18

## 2024-04-19 ENCOUNTER — OFFICE VISIT (OUTPATIENT)
Dept: OPHTHALMOLOGY | Facility: CLINIC | Age: 74
End: 2024-04-19
Payer: COMMERCIAL

## 2024-04-19 DIAGNOSIS — H25.813 COMBINED FORM OF AGE-RELATED CATARACT, BOTH EYES: Primary | ICD-10-CM

## 2024-04-19 PROCEDURE — 92014 COMPRE OPH EXAM EST PT 1/>: CPT | Performed by: OPHTHALMOLOGY

## 2024-04-19 PROCEDURE — 92136 OPHTHALMIC BIOMETRY: CPT | Mod: BILATERAL PROCEDURE | Performed by: OPHTHALMOLOGY

## 2024-04-19 PROCEDURE — 92136 OPHTHALMIC BIOMETRY: CPT | Performed by: OPHTHALMOLOGY

## 2024-04-19 RX ORDER — BLOOD-GLUCOSE SENSOR
EACH MISCELLANEOUS
Qty: 3 EACH | Refills: 11 | Status: SHIPPED | OUTPATIENT
Start: 2024-04-19

## 2024-04-19 ASSESSMENT — ENCOUNTER SYMPTOMS
HEMATOLOGIC/LYMPHATIC NEGATIVE: 0
MUSCULOSKELETAL NEGATIVE: 0
CARDIOVASCULAR NEGATIVE: 0
EYES NEGATIVE: 0
PSYCHIATRIC NEGATIVE: 0
GASTROINTESTINAL NEGATIVE: 0
CONSTITUTIONAL NEGATIVE: 0
NEUROLOGICAL NEGATIVE: 0
RESPIRATORY NEGATIVE: 0
ENDOCRINE NEGATIVE: 0
ALLERGIC/IMMUNOLOGIC NEGATIVE: 0

## 2024-04-19 ASSESSMENT — REFRACTION_MANIFEST
OD_AXIS: 085
OD_ADD: +2.50
OS_AXIS: 085
OD_CYLINDER: -2.50
OD_SPHERE: +2.50
OD_AXIS: 085
OS_AXIS: 085
OS_CYLINDER: -1.75
OS_SPHERE: +2.25
OD_CYLINDER: -2.50
OD_SPHERE: +2.25
OS_ADD: +2.50
METHOD_AUTOREFRACTION: 1
OS_SPHERE: +2.25
OS_CYLINDER: -1.75

## 2024-04-19 ASSESSMENT — REFRACTION_WEARINGRX
OD_ADD: +2.50
OD_AXIS: 080
OS_AXIS: 090
OD_SPHERE: +1.00
OD_CYLINDER: -1.00
OS_CYLINDER: -1.75
OS_SPHERE: +1.75
OS_ADD: +2.50

## 2024-04-19 ASSESSMENT — SLIT LAMP EXAM - LIDS
COMMENTS: GOOD POSITION
COMMENTS: GOOD POSITION

## 2024-04-19 ASSESSMENT — VISUAL ACUITY
CORRECTION_TYPE: GLASSES
OS_BAT_MED: 20/40
OS_BAT_HIGH: 20/40
METHOD: SNELLEN - LINEAR
OD_BAT_HIGH: 20/50
OD_CC+: -2
OD_BAT_MED: 20/50
OS_CC: 20/30
OS_BAT_LOW: 20/40
OD_CC: 20/40
OD_BAT_LOW: 20/50

## 2024-04-19 ASSESSMENT — CUP TO DISC RATIO
OS_RATIO: .3
OD_RATIO: .3

## 2024-04-19 ASSESSMENT — CONF VISUAL FIELD
OD_SUPERIOR_NASAL_RESTRICTION: 0
OD_NORMAL: 1
OS_SUPERIOR_TEMPORAL_RESTRICTION: 0
OD_INFERIOR_NASAL_RESTRICTION: 0
OS_SUPERIOR_NASAL_RESTRICTION: 0
OD_INFERIOR_TEMPORAL_RESTRICTION: 0
OS_NORMAL: 1
OS_INFERIOR_TEMPORAL_RESTRICTION: 0
OS_INFERIOR_NASAL_RESTRICTION: 0
OD_SUPERIOR_TEMPORAL_RESTRICTION: 0

## 2024-04-19 ASSESSMENT — TONOMETRY
OS_IOP_MMHG: 16
IOP_METHOD: GOLDMANN APPLANATION
OD_IOP_MMHG: 16

## 2024-04-19 ASSESSMENT — EXTERNAL EXAM - RIGHT EYE: OD_EXAM: NORMAL

## 2024-04-19 ASSESSMENT — EXTERNAL EXAM - LEFT EYE: OS_EXAM: NORMAL

## 2024-04-19 NOTE — PROGRESS NOTES
Assessment/Plan   Diagnoses and all orders for this visit:  Combined form of age-related cataract, both eyes    -     IOL Biometry - OU - Both Eyes  Cataract not visually significant at this time. Discussed cataract surgery -if Dr Campoverde feels the cataracts limit ability to assess and treat the DR, will schedule  Otherwise, the vision is better with refraction and the patient is not symptomatic  Observe for progression

## 2024-04-22 ENCOUNTER — TELEMEDICINE (OUTPATIENT)
Dept: PRIMARY CARE | Facility: CLINIC | Age: 74
End: 2024-04-22
Payer: COMMERCIAL

## 2024-04-22 VITALS — HEIGHT: 66 IN | BODY MASS INDEX: 34.41 KG/M2

## 2024-04-22 DIAGNOSIS — E11.65 TYPE 2 DIABETES MELLITUS WITH HYPERGLYCEMIA, WITH LONG-TERM CURRENT USE OF INSULIN (MULTI): Primary | ICD-10-CM

## 2024-04-22 DIAGNOSIS — I10 BENIGN HYPERTENSION: ICD-10-CM

## 2024-04-22 DIAGNOSIS — Z79.4 TYPE 2 DIABETES MELLITUS WITH HYPERGLYCEMIA, WITH LONG-TERM CURRENT USE OF INSULIN (MULTI): Primary | ICD-10-CM

## 2024-04-22 PROCEDURE — 1126F AMNT PAIN NOTED NONE PRSNT: CPT

## 2024-04-22 PROCEDURE — 3062F POS MACROALBUMINURIA REV: CPT

## 2024-04-22 PROCEDURE — 1160F RVW MEDS BY RX/DR IN RCRD: CPT

## 2024-04-22 PROCEDURE — 99213 OFFICE O/P EST LOW 20 MIN: CPT

## 2024-04-22 PROCEDURE — 3051F HG A1C>EQUAL 7.0%<8.0%: CPT

## 2024-04-22 PROCEDURE — 1036F TOBACCO NON-USER: CPT

## 2024-04-22 PROCEDURE — 1159F MED LIST DOCD IN RCRD: CPT

## 2024-04-22 PROCEDURE — 4010F ACE/ARB THERAPY RXD/TAKEN: CPT

## 2024-04-22 ASSESSMENT — ENCOUNTER SYMPTOMS
DIARRHEA: 0
SHORTNESS OF BREATH: 0
FATIGUE: 0
RHINORRHEA: 0
NAUSEA: 0
FEVER: 0
COUGH: 0
APPETITE CHANGE: 0

## 2024-04-22 ASSESSMENT — PAIN SCALES - GENERAL: PAINLEVEL: 0-NO PAIN

## 2024-04-22 NOTE — PROGRESS NOTES
"Subjective   Patient ID: Jacklyn Villa is a 73 y.o. female who presents for Follow-up via phone visit.    HPI       Patient reports no concerns today. We attempted a video visit, but it would not connect so we switched to phone visit. We discussed her visits with her specilists since her last visit with me. She is understood on all her medications and does not need any refills. She had her colonoscopy and due in 5 years for final colonoscopy.     A home nurse came to visit and this was her most recent vitals and A1c:  A1c 7.2% with home nurse  /74  HR 88, wt 205 BMI 34  Lost weight with changes in portion control    Review of Systems   Constitutional:  Negative for appetite change, fatigue and fever.   HENT:  Negative for congestion, rhinorrhea and sneezing.    Respiratory:  Negative for cough and shortness of breath.    Cardiovascular:  Negative for chest pain and leg swelling.   Gastrointestinal:  Negative for diarrhea and nausea.       Objective   Ht 1.664 m (5' 5.5\")   BMI 34.41 kg/m²     Phone interview: voice nl pace, tone, & quality        Assessment/Plan   Jacklyn is a 73 y.o. 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 Spent most of visit reviewing visits with specialists (Neph/ ophthalmology/Endo) and follow up on preventative test. Patient chronic conditions stable. No medications changes or refills needed at this time.   Diagnoses and all orders for this visit:  Type 2 diabetes mellitus with hyperglycemia, with long-term current use of insulin (Multi)  - controlled, A1c 7.2%  -follows closely with Endocrinology, current regimen:  farxiga 10mg   ozempic 2mg -on higher dose for about a month   lantus 15   Lisorp sliding sacle 2:50 >150-->requires about 2 units for lunch  Other orders  -     Follow Up In Primary Care; Future in 3 months for Medicare Annual Wellness exam    Discussed with Dr. Marcus Mayo, DO " FM PGY2

## 2024-05-04 ENCOUNTER — HOSPITAL ENCOUNTER (INPATIENT)
Facility: HOSPITAL | Age: 74
LOS: 4 days | Discharge: HOME | DRG: 637 | End: 2024-05-09
Attending: EMERGENCY MEDICINE | Admitting: INTERNAL MEDICINE
Payer: COMMERCIAL

## 2024-05-04 ENCOUNTER — CLINICAL SUPPORT (OUTPATIENT)
Dept: EMERGENCY MEDICINE | Facility: HOSPITAL | Age: 74
DRG: 637 | End: 2024-05-04
Payer: COMMERCIAL

## 2024-05-04 ENCOUNTER — APPOINTMENT (OUTPATIENT)
Dept: RADIOLOGY | Facility: HOSPITAL | Age: 74
DRG: 637 | End: 2024-05-04
Payer: COMMERCIAL

## 2024-05-04 DIAGNOSIS — D12.6 TUBULAR ADENOMA OF COLON: ICD-10-CM

## 2024-05-04 DIAGNOSIS — R73.9 HYPERGLYCEMIA: Primary | ICD-10-CM

## 2024-05-04 DIAGNOSIS — Z79.4 TYPE 2 DIABETES MELLITUS WITHOUT COMPLICATION, WITH LONG-TERM CURRENT USE OF INSULIN (MULTI): ICD-10-CM

## 2024-05-04 DIAGNOSIS — E11.65 TYPE 2 DIABETES MELLITUS WITH HYPERGLYCEMIA, WITH LONG-TERM CURRENT USE OF INSULIN (MULTI): ICD-10-CM

## 2024-05-04 DIAGNOSIS — Z94.0 KIDNEY REPLACED BY TRANSPLANT (HHS-HCC): ICD-10-CM

## 2024-05-04 DIAGNOSIS — H57.89 SCLERAL INJECTION: ICD-10-CM

## 2024-05-04 DIAGNOSIS — Z91.148 NONCOMPLIANCE WITH MEDICATION REGIMEN: ICD-10-CM

## 2024-05-04 DIAGNOSIS — N39.0 URINARY TRACT INFECTION WITHOUT HEMATURIA, SITE UNSPECIFIED: ICD-10-CM

## 2024-05-04 DIAGNOSIS — I15.9 SECONDARY HYPERTENSION: ICD-10-CM

## 2024-05-04 DIAGNOSIS — E11.9 TYPE 2 DIABETES MELLITUS WITHOUT COMPLICATION, WITH LONG-TERM CURRENT USE OF INSULIN (MULTI): ICD-10-CM

## 2024-05-04 DIAGNOSIS — R53.1 WEAKNESS: ICD-10-CM

## 2024-05-04 DIAGNOSIS — I70.1 RENAL ARTERY STENOSIS (CMS-HCC): ICD-10-CM

## 2024-05-04 DIAGNOSIS — Z79.4 TYPE 2 DIABETES MELLITUS WITH HYPERGLYCEMIA, WITH LONG-TERM CURRENT USE OF INSULIN (MULTI): ICD-10-CM

## 2024-05-04 DIAGNOSIS — R01.1 SYSTOLIC MURMUR: ICD-10-CM

## 2024-05-04 LAB
ALBUMIN SERPL BCP-MCNC: 3.3 G/DL (ref 3.4–5)
ALP SERPL-CCNC: 77 U/L (ref 33–136)
ALT SERPL W P-5'-P-CCNC: 42 U/L (ref 7–45)
ANION GAP BLDV CALCULATED.4IONS-SCNC: 16 MMOL/L (ref 10–25)
ANION GAP SERPL CALC-SCNC: 20 MMOL/L (ref 10–20)
APPEARANCE UR: ABNORMAL
APPEARANCE UR: ABNORMAL
AST SERPL W P-5'-P-CCNC: 51 U/L (ref 9–39)
ATRIAL RATE: 109 BPM
BACTERIA #/AREA URNS AUTO: ABNORMAL /HPF
BACTERIA #/AREA URNS AUTO: ABNORMAL /HPF
BASE EXCESS BLDV CALC-SCNC: -4.3 MMOL/L (ref -2–3)
BASOPHILS # BLD AUTO: 0.03 X10*3/UL (ref 0–0.1)
BASOPHILS NFR BLD AUTO: 0.5 %
BILIRUB DIRECT SERPL-MCNC: 0.2 MG/DL (ref 0–0.3)
BILIRUB SERPL-MCNC: 0.7 MG/DL (ref 0–1.2)
BILIRUB UR STRIP.AUTO-MCNC: NEGATIVE MG/DL
BILIRUB UR STRIP.AUTO-MCNC: NEGATIVE MG/DL
BODY TEMPERATURE: 37 DEGREES CELSIUS
BUN SERPL-MCNC: 45 MG/DL (ref 6–23)
CA-I BLDV-SCNC: 1.33 MMOL/L (ref 1.1–1.33)
CALCIUM SERPL-MCNC: 9.5 MG/DL (ref 8.6–10.6)
CHLORIDE BLDV-SCNC: 92 MMOL/L (ref 98–107)
CHLORIDE SERPL-SCNC: 92 MMOL/L (ref 98–107)
CO2 SERPL-SCNC: 20 MMOL/L (ref 21–32)
COLOR UR: YELLOW
COLOR UR: YELLOW
CREAT SERPL-MCNC: 1.68 MG/DL (ref 0.5–1.05)
EGFRCR SERPLBLD CKD-EPI 2021: 32 ML/MIN/1.73M*2
EOSINOPHIL # BLD AUTO: 0 X10*3/UL (ref 0–0.4)
EOSINOPHIL NFR BLD AUTO: 0 %
ERYTHROCYTE [DISTWIDTH] IN BLOOD BY AUTOMATED COUNT: 13.9 % (ref 11.5–14.5)
GLUCOSE BLD MANUAL STRIP-MCNC: 477 MG/DL (ref 74–99)
GLUCOSE BLD MANUAL STRIP-MCNC: 515 MG/DL (ref 74–99)
GLUCOSE BLDV-MCNC: 551 MG/DL (ref 74–99)
GLUCOSE SERPL-MCNC: 537 MG/DL (ref 74–99)
GLUCOSE UR STRIP.AUTO-MCNC: ABNORMAL MG/DL
GLUCOSE UR STRIP.AUTO-MCNC: ABNORMAL MG/DL
HCO3 BLDV-SCNC: 21.6 MMOL/L (ref 22–26)
HCT VFR BLD AUTO: 39.3 % (ref 36–46)
HCT VFR BLD EST: 40 % (ref 36–46)
HGB BLD-MCNC: 12.9 G/DL (ref 12–16)
HGB BLDV-MCNC: 13.3 G/DL (ref 12–16)
HOLD SPECIMEN: NORMAL
IMM GRANULOCYTES # BLD AUTO: 0.04 X10*3/UL (ref 0–0.5)
IMM GRANULOCYTES NFR BLD AUTO: 0.7 % (ref 0–0.9)
INHALED O2 CONCENTRATION: 21 %
INR PPP: 1.2 (ref 0.9–1.1)
KETONES UR STRIP.AUTO-MCNC: ABNORMAL MG/DL
KETONES UR STRIP.AUTO-MCNC: ABNORMAL MG/DL
LACTATE BLDV-SCNC: 1.9 MMOL/L (ref 0.4–2)
LEUKOCYTE ESTERASE UR QL STRIP.AUTO: ABNORMAL
LEUKOCYTE ESTERASE UR QL STRIP.AUTO: ABNORMAL
LIPASE SERPL-CCNC: 122 U/L (ref 9–82)
LYMPHOCYTES # BLD AUTO: 0.23 X10*3/UL (ref 0.8–3)
LYMPHOCYTES NFR BLD AUTO: 3.9 %
MCH RBC QN AUTO: 27.2 PG (ref 26–34)
MCHC RBC AUTO-ENTMCNC: 32.8 G/DL (ref 32–36)
MCV RBC AUTO: 83 FL (ref 80–100)
MONOCYTES # BLD AUTO: 1.2 X10*3/UL (ref 0.05–0.8)
MONOCYTES NFR BLD AUTO: 20.5 %
MUCOUS THREADS #/AREA URNS AUTO: ABNORMAL /LPF
MUCOUS THREADS #/AREA URNS AUTO: ABNORMAL /LPF
NEUTROPHILS # BLD AUTO: 4.35 X10*3/UL (ref 1.6–5.5)
NEUTROPHILS NFR BLD AUTO: 74.4 %
NITRITE UR QL STRIP.AUTO: NEGATIVE
NITRITE UR QL STRIP.AUTO: NEGATIVE
NRBC BLD-RTO: 0 /100 WBCS (ref 0–0)
OSMOLALITY SERPL: 315 MOSM/KG (ref 280–300)
OXYHGB MFR BLDV: 53.9 % (ref 45–75)
P AXIS: 59 DEGREES
P OFFSET: 202 MS
P ONSET: 140 MS
PCO2 BLDV: 42 MM HG (ref 41–51)
PH BLDV: 7.32 PH (ref 7.33–7.43)
PH UR STRIP.AUTO: 5.5 [PH]
PH UR STRIP.AUTO: 5.5 [PH]
PLATELET # BLD AUTO: 108 X10*3/UL (ref 150–450)
PO2 BLDV: 37 MM HG (ref 35–45)
POTASSIUM BLDV-SCNC: 5.5 MMOL/L (ref 3.5–5.3)
POTASSIUM SERPL-SCNC: 4.9 MMOL/L (ref 3.5–5.3)
PR INTERVAL: 152 MS
PROT SERPL-MCNC: 6 G/DL (ref 6.4–8.2)
PROT UR STRIP.AUTO-MCNC: ABNORMAL MG/DL
PROT UR STRIP.AUTO-MCNC: ABNORMAL MG/DL
PROTHROMBIN TIME: 13.2 SECONDS (ref 9.8–12.8)
Q ONSET: 216 MS
QRS COUNT: 18 BEATS
QRS DURATION: 78 MS
QT INTERVAL: 298 MS
QTC CALCULATION(BAZETT): 401 MS
QTC FREDERICIA: 363 MS
R AXIS: 59 DEGREES
RBC # BLD AUTO: 4.74 X10*6/UL (ref 4–5.2)
RBC # UR STRIP.AUTO: ABNORMAL /UL
RBC # UR STRIP.AUTO: ABNORMAL /UL
RBC #/AREA URNS AUTO: ABNORMAL /HPF
RBC #/AREA URNS AUTO: ABNORMAL /HPF
SAO2 % BLDV: 55 % (ref 45–75)
SODIUM BLDV-SCNC: 124 MMOL/L (ref 136–145)
SODIUM SERPL-SCNC: 127 MMOL/L (ref 136–145)
SP GR UR STRIP.AUTO: 1.02
SP GR UR STRIP.AUTO: 1.02
SQUAMOUS #/AREA URNS AUTO: ABNORMAL /HPF
SQUAMOUS #/AREA URNS AUTO: ABNORMAL /HPF
T AXIS: 16 DEGREES
T OFFSET: 365 MS
UROBILINOGEN UR STRIP.AUTO-MCNC: NORMAL MG/DL
UROBILINOGEN UR STRIP.AUTO-MCNC: NORMAL MG/DL
VENTRICULAR RATE: 109 BPM
WBC # BLD AUTO: 5.9 X10*3/UL (ref 4.4–11.3)
WBC #/AREA URNS AUTO: ABNORMAL /HPF
WBC #/AREA URNS AUTO: ABNORMAL /HPF
YEAST BUDDING #/AREA UR COMP ASSIST: PRESENT /HPF

## 2024-05-04 PROCEDURE — 36415 COLL VENOUS BLD VENIPUNCTURE: CPT | Performed by: STUDENT IN AN ORGANIZED HEALTH CARE EDUCATION/TRAINING PROGRAM

## 2024-05-04 PROCEDURE — 82248 BILIRUBIN DIRECT: CPT | Performed by: EMERGENCY MEDICINE

## 2024-05-04 PROCEDURE — 36415 COLL VENOUS BLD VENIPUNCTURE: CPT

## 2024-05-04 PROCEDURE — 2500000004 HC RX 250 GENERAL PHARMACY W/ HCPCS (ALT 636 FOR OP/ED): Mod: SE

## 2024-05-04 PROCEDURE — 85652 RBC SED RATE AUTOMATED: CPT | Performed by: STUDENT IN AN ORGANIZED HEALTH CARE EDUCATION/TRAINING PROGRAM

## 2024-05-04 PROCEDURE — 85025 COMPLETE CBC W/AUTO DIFF WBC: CPT | Performed by: STUDENT IN AN ORGANIZED HEALTH CARE EDUCATION/TRAINING PROGRAM

## 2024-05-04 PROCEDURE — 85610 PROTHROMBIN TIME: CPT

## 2024-05-04 PROCEDURE — 84132 ASSAY OF SERUM POTASSIUM: CPT | Mod: 91 | Performed by: STUDENT IN AN ORGANIZED HEALTH CARE EDUCATION/TRAINING PROGRAM

## 2024-05-04 PROCEDURE — 87086 URINE CULTURE/COLONY COUNT: CPT | Performed by: EMERGENCY MEDICINE

## 2024-05-04 PROCEDURE — 93005 ELECTROCARDIOGRAM TRACING: CPT

## 2024-05-04 PROCEDURE — 99285 EMERGENCY DEPT VISIT HI MDM: CPT | Mod: 25

## 2024-05-04 PROCEDURE — 85025 COMPLETE CBC W/AUTO DIFF WBC: CPT | Performed by: EMERGENCY MEDICINE

## 2024-05-04 PROCEDURE — 96360 HYDRATION IV INFUSION INIT: CPT

## 2024-05-04 PROCEDURE — 83930 ASSAY OF BLOOD OSMOLALITY: CPT

## 2024-05-04 PROCEDURE — 82947 ASSAY GLUCOSE BLOOD QUANT: CPT

## 2024-05-04 PROCEDURE — 71045 X-RAY EXAM CHEST 1 VIEW: CPT | Performed by: STUDENT IN AN ORGANIZED HEALTH CARE EDUCATION/TRAINING PROGRAM

## 2024-05-04 PROCEDURE — 2500000002 HC RX 250 W HCPCS SELF ADMINISTERED DRUGS (ALT 637 FOR MEDICARE OP, ALT 636 FOR OP/ED): Mod: SE

## 2024-05-04 PROCEDURE — 93010 ELECTROCARDIOGRAM REPORT: CPT | Performed by: EMERGENCY MEDICINE

## 2024-05-04 PROCEDURE — 83690 ASSAY OF LIPASE: CPT

## 2024-05-04 PROCEDURE — 99285 EMERGENCY DEPT VISIT HI MDM: CPT | Performed by: EMERGENCY MEDICINE

## 2024-05-04 PROCEDURE — 81001 URINALYSIS AUTO W/SCOPE: CPT | Performed by: EMERGENCY MEDICINE

## 2024-05-04 PROCEDURE — 96361 HYDRATE IV INFUSION ADD-ON: CPT

## 2024-05-04 PROCEDURE — 84132 ASSAY OF SERUM POTASSIUM: CPT | Mod: 91 | Performed by: EMERGENCY MEDICINE

## 2024-05-04 PROCEDURE — 81003 URINALYSIS AUTO W/O SCOPE: CPT | Performed by: EMERGENCY MEDICINE

## 2024-05-04 PROCEDURE — 82248 BILIRUBIN DIRECT: CPT | Performed by: STUDENT IN AN ORGANIZED HEALTH CARE EDUCATION/TRAINING PROGRAM

## 2024-05-04 PROCEDURE — 83605 ASSAY OF LACTIC ACID: CPT | Mod: 91 | Performed by: EMERGENCY MEDICINE

## 2024-05-04 PROCEDURE — 71045 X-RAY EXAM CHEST 1 VIEW: CPT

## 2024-05-04 PROCEDURE — 83036 HEMOGLOBIN GLYCOSYLATED A1C: CPT | Performed by: STUDENT IN AN ORGANIZED HEALTH CARE EDUCATION/TRAINING PROGRAM

## 2024-05-04 RX ORDER — INSULIN GLARGINE 100 [IU]/ML
15 INJECTION, SOLUTION SUBCUTANEOUS NIGHTLY
Status: DISCONTINUED | OUTPATIENT
Start: 2024-05-04 | End: 2024-05-06

## 2024-05-04 RX ORDER — DEXTROSE 50 % IN WATER (D50W) INTRAVENOUS SYRINGE
12.5
Status: DISCONTINUED | OUTPATIENT
Start: 2024-05-04 | End: 2024-05-09 | Stop reason: HOSPADM

## 2024-05-04 RX ORDER — INSULIN LISPRO 100 [IU]/ML
8 INJECTION, SOLUTION INTRAVENOUS; SUBCUTANEOUS ONCE
Status: COMPLETED | OUTPATIENT
Start: 2024-05-04 | End: 2024-05-04

## 2024-05-04 RX ORDER — DEXTROSE 50 % IN WATER (D50W) INTRAVENOUS SYRINGE
25
Status: DISCONTINUED | OUTPATIENT
Start: 2024-05-04 | End: 2024-05-09 | Stop reason: HOSPADM

## 2024-05-04 RX ADMIN — SODIUM CHLORIDE, POTASSIUM CHLORIDE, SODIUM LACTATE AND CALCIUM CHLORIDE 1000 ML: 600; 310; 30; 20 INJECTION, SOLUTION INTRAVENOUS at 21:13

## 2024-05-04 RX ADMIN — SODIUM CHLORIDE, POTASSIUM CHLORIDE, SODIUM LACTATE AND CALCIUM CHLORIDE 1000 ML: 600; 310; 30; 20 INJECTION, SOLUTION INTRAVENOUS at 23:09

## 2024-05-04 RX ADMIN — INSULIN LISPRO 8 UNITS: 100 INJECTION, SOLUTION INTRAVENOUS; SUBCUTANEOUS at 21:17

## 2024-05-04 RX ADMIN — INSULIN GLARGINE 15 UNITS: 100 INJECTION, SOLUTION SUBCUTANEOUS at 21:18

## 2024-05-04 ASSESSMENT — COLUMBIA-SUICIDE SEVERITY RATING SCALE - C-SSRS
1. IN THE PAST MONTH, HAVE YOU WISHED YOU WERE DEAD OR WISHED YOU COULD GO TO SLEEP AND NOT WAKE UP?: NO
2. HAVE YOU ACTUALLY HAD ANY THOUGHTS OF KILLING YOURSELF?: NO
6. HAVE YOU EVER DONE ANYTHING, STARTED TO DO ANYTHING, OR PREPARED TO DO ANYTHING TO END YOUR LIFE?: NO

## 2024-05-04 NOTE — ED TRIAGE NOTES
"Pt presents to the ED for multiple medical complaints. Per son, pt been sleeping a lot, shaky, loss of appetite for the last 3 days. Son also states patient has been having diarrhea the past couple days. Pt had kidney transplant 2-3 years ago, DM (medication compliant). Pt son states she just moved from her house into his and has been fatigued since, \"slower with responses.\" Pt AxOx3,  in triage.  "

## 2024-05-05 ENCOUNTER — APPOINTMENT (OUTPATIENT)
Dept: RADIOLOGY | Facility: HOSPITAL | Age: 74
DRG: 637 | End: 2024-05-05
Payer: COMMERCIAL

## 2024-05-05 ENCOUNTER — CLINICAL SUPPORT (OUTPATIENT)
Dept: EMERGENCY MEDICINE | Facility: HOSPITAL | Age: 74
DRG: 637 | End: 2024-05-05
Payer: COMMERCIAL

## 2024-05-05 PROBLEM — R73.9 HYPERGLYCEMIA: Status: ACTIVE | Noted: 2024-05-05

## 2024-05-05 LAB
ALBUMIN SERPL BCP-MCNC: 3.1 G/DL (ref 3.4–5)
ALBUMIN SERPL BCP-MCNC: 3.2 G/DL (ref 3.4–5)
ALBUMIN SERPL BCP-MCNC: 3.5 G/DL (ref 3.4–5)
ALP SERPL-CCNC: 92 U/L (ref 33–136)
ALT SERPL W P-5'-P-CCNC: 35 U/L (ref 7–45)
ANION GAP BLDV CALCULATED.4IONS-SCNC: 12 MMOL/L (ref 10–25)
ANION GAP BLDV CALCULATED.4IONS-SCNC: 12 MMOL/L (ref 10–25)
ANION GAP SERPL CALC-SCNC: 13 MMOL/L (ref 10–20)
ANION GAP SERPL CALC-SCNC: 15 MMOL/L (ref 10–20)
ANION GAP SERPL CALC-SCNC: 20 MMOL/L (ref 10–20)
AST SERPL W P-5'-P-CCNC: 52 U/L (ref 9–39)
BASE EXCESS BLDV CALC-SCNC: -0.2 MMOL/L (ref -2–3)
BASE EXCESS BLDV CALC-SCNC: -1.6 MMOL/L (ref -2–3)
BASOPHILS # BLD AUTO: 0.03 X10*3/UL (ref 0–0.1)
BASOPHILS NFR BLD AUTO: 0.6 %
BILIRUB DIRECT SERPL-MCNC: 0.2 MG/DL (ref 0–0.3)
BILIRUB SERPL-MCNC: 0.8 MG/DL (ref 0–1.2)
BODY TEMPERATURE: 37 DEGREES CELSIUS
BODY TEMPERATURE: 37 DEGREES CELSIUS
BUN SERPL-MCNC: 37 MG/DL (ref 6–23)
BUN SERPL-MCNC: 40 MG/DL (ref 6–23)
BUN SERPL-MCNC: 41 MG/DL (ref 6–23)
CA-I BLDV-SCNC: 1.26 MMOL/L (ref 1.1–1.33)
CA-I BLDV-SCNC: 1.33 MMOL/L (ref 1.1–1.33)
CALCIUM SERPL-MCNC: 10 MG/DL (ref 8.6–10.6)
CALCIUM SERPL-MCNC: 9.3 MG/DL (ref 8.6–10.6)
CALCIUM SERPL-MCNC: 9.6 MG/DL (ref 8.6–10.6)
CHLORIDE BLDV-SCNC: 95 MMOL/L (ref 98–107)
CHLORIDE BLDV-SCNC: 96 MMOL/L (ref 98–107)
CHLORIDE SERPL-SCNC: 97 MMOL/L (ref 98–107)
CHLORIDE SERPL-SCNC: 97 MMOL/L (ref 98–107)
CHLORIDE SERPL-SCNC: 98 MMOL/L (ref 98–107)
CO2 SERPL-SCNC: 17 MMOL/L (ref 21–32)
CO2 SERPL-SCNC: 22 MMOL/L (ref 21–32)
CO2 SERPL-SCNC: 27 MMOL/L (ref 21–32)
CREAT SERPL-MCNC: 1.2 MG/DL (ref 0.5–1.05)
CREAT SERPL-MCNC: 1.53 MG/DL (ref 0.5–1.05)
CREAT SERPL-MCNC: 1.55 MG/DL (ref 0.5–1.05)
CRP SERPL-MCNC: 34.18 MG/DL
EGFRCR SERPLBLD CKD-EPI 2021: 36 ML/MIN/1.73M*2
EGFRCR SERPLBLD CKD-EPI 2021: 36 ML/MIN/1.73M*2
EGFRCR SERPLBLD CKD-EPI 2021: 48 ML/MIN/1.73M*2
EOSINOPHIL # BLD AUTO: 0 X10*3/UL (ref 0–0.4)
EOSINOPHIL NFR BLD AUTO: 0 %
ERYTHROCYTE [DISTWIDTH] IN BLOOD BY AUTOMATED COUNT: 13.8 % (ref 11.5–14.5)
ERYTHROCYTE [SEDIMENTATION RATE] IN BLOOD BY WESTERGREN METHOD: >130 MM/H (ref 0–30)
EST. AVERAGE GLUCOSE BLD GHB EST-MCNC: 148 MG/DL
GLUCOSE BLD MANUAL STRIP-MCNC: 230 MG/DL (ref 74–99)
GLUCOSE BLD MANUAL STRIP-MCNC: 234 MG/DL (ref 74–99)
GLUCOSE BLD MANUAL STRIP-MCNC: 265 MG/DL (ref 74–99)
GLUCOSE BLD MANUAL STRIP-MCNC: 364 MG/DL (ref 74–99)
GLUCOSE BLD MANUAL STRIP-MCNC: 390 MG/DL (ref 74–99)
GLUCOSE BLDV-MCNC: 376 MG/DL (ref 74–99)
GLUCOSE BLDV-MCNC: 379 MG/DL (ref 74–99)
GLUCOSE SERPL-MCNC: 284 MG/DL (ref 74–99)
GLUCOSE SERPL-MCNC: 308 MG/DL (ref 74–99)
GLUCOSE SERPL-MCNC: 349 MG/DL (ref 74–99)
HBA1C MFR BLD: 6.8 %
HCO3 BLDV-SCNC: 25.2 MMOL/L (ref 22–26)
HCO3 BLDV-SCNC: 26.4 MMOL/L (ref 22–26)
HCT VFR BLD AUTO: 36.6 % (ref 36–46)
HCT VFR BLD EST: 38 % (ref 36–46)
HCT VFR BLD EST: 38 % (ref 36–46)
HGB BLD-MCNC: 12.5 G/DL (ref 12–16)
HGB BLDV-MCNC: 12.5 G/DL (ref 12–16)
HGB BLDV-MCNC: 12.6 G/DL (ref 12–16)
HOLD SPECIMEN: NORMAL
IMM GRANULOCYTES # BLD AUTO: 0.06 X10*3/UL (ref 0–0.5)
IMM GRANULOCYTES NFR BLD AUTO: 1.1 % (ref 0–0.9)
LACTATE BLDV-SCNC: 2.2 MMOL/L (ref 0.4–2)
LACTATE BLDV-SCNC: 3 MMOL/L (ref 0.4–2)
LACTATE SERPL-SCNC: 1.6 MMOL/L (ref 0.4–2)
LYMPHOCYTES # BLD AUTO: 0.18 X10*3/UL (ref 0.8–3)
LYMPHOCYTES NFR BLD AUTO: 3.3 %
MAGNESIUM SERPL-MCNC: 2.29 MG/DL (ref 1.6–2.4)
MCH RBC QN AUTO: 27.9 PG (ref 26–34)
MCHC RBC AUTO-ENTMCNC: 34.2 G/DL (ref 32–36)
MCV RBC AUTO: 82 FL (ref 80–100)
MONOCYTES # BLD AUTO: 1.02 X10*3/UL (ref 0.05–0.8)
MONOCYTES NFR BLD AUTO: 18.8 %
NEUTROPHILS # BLD AUTO: 4.13 X10*3/UL (ref 1.6–5.5)
NEUTROPHILS NFR BLD AUTO: 76.2 %
NRBC BLD-RTO: 0 /100 WBCS (ref 0–0)
OXYHGB MFR BLDV: 29.6 % (ref 45–75)
OXYHGB MFR BLDV: 33.2 % (ref 45–75)
PCO2 BLDV: 50 MM HG (ref 41–51)
PCO2 BLDV: 50 MM HG (ref 41–51)
PH BLDV: 7.31 PH (ref 7.33–7.43)
PH BLDV: 7.33 PH (ref 7.33–7.43)
PHOSPHATE SERPL-MCNC: 2 MG/DL (ref 2.5–4.9)
PHOSPHATE SERPL-MCNC: 2.2 MG/DL (ref 2.5–4.9)
PLATELET # BLD AUTO: 104 X10*3/UL (ref 150–450)
PO2 BLDV: 25 MM HG (ref 35–45)
PO2 BLDV: 26 MM HG (ref 35–45)
POTASSIUM BLDV-SCNC: 5.9 MMOL/L (ref 3.5–5.3)
POTASSIUM BLDV-SCNC: 6.4 MMOL/L (ref 3.5–5.3)
POTASSIUM SERPL-SCNC: 4.7 MMOL/L (ref 3.5–5.3)
POTASSIUM SERPL-SCNC: 5.3 MMOL/L (ref 3.5–5.3)
POTASSIUM SERPL-SCNC: 5.9 MMOL/L (ref 3.5–5.3)
PROT SERPL-MCNC: 6.7 G/DL (ref 6.4–8.2)
RBC # BLD AUTO: 4.48 X10*6/UL (ref 4–5.2)
SAO2 % BLDV: 30 % (ref 45–75)
SAO2 % BLDV: 34 % (ref 45–75)
SODIUM BLDV-SCNC: 126 MMOL/L (ref 136–145)
SODIUM BLDV-SCNC: 128 MMOL/L (ref 136–145)
SODIUM SERPL-SCNC: 128 MMOL/L (ref 136–145)
SODIUM SERPL-SCNC: 130 MMOL/L (ref 136–145)
SODIUM SERPL-SCNC: 132 MMOL/L (ref 136–145)
TACROLIMUS BLD-MCNC: <2 NG/ML
WBC # BLD AUTO: 5.4 X10*3/UL (ref 4.4–11.3)

## 2024-05-05 PROCEDURE — 87040 BLOOD CULTURE FOR BACTERIA: CPT | Performed by: STUDENT IN AN ORGANIZED HEALTH CARE EDUCATION/TRAINING PROGRAM

## 2024-05-05 PROCEDURE — 86140 C-REACTIVE PROTEIN: CPT | Performed by: STUDENT IN AN ORGANIZED HEALTH CARE EDUCATION/TRAINING PROGRAM

## 2024-05-05 PROCEDURE — 80197 ASSAY OF TACROLIMUS: CPT | Performed by: STUDENT IN AN ORGANIZED HEALTH CARE EDUCATION/TRAINING PROGRAM

## 2024-05-05 PROCEDURE — 82248 BILIRUBIN DIRECT: CPT | Performed by: STUDENT IN AN ORGANIZED HEALTH CARE EDUCATION/TRAINING PROGRAM

## 2024-05-05 PROCEDURE — 93005 ELECTROCARDIOGRAM TRACING: CPT

## 2024-05-05 PROCEDURE — 70450 CT HEAD/BRAIN W/O DYE: CPT | Performed by: RADIOLOGY

## 2024-05-05 PROCEDURE — 84132 ASSAY OF SERUM POTASSIUM: CPT | Mod: 91

## 2024-05-05 PROCEDURE — 82947 ASSAY GLUCOSE BLOOD QUANT: CPT

## 2024-05-05 PROCEDURE — 2500000004 HC RX 250 GENERAL PHARMACY W/ HCPCS (ALT 636 FOR OP/ED): Performed by: STUDENT IN AN ORGANIZED HEALTH CARE EDUCATION/TRAINING PROGRAM

## 2024-05-05 PROCEDURE — 99223 1ST HOSP IP/OBS HIGH 75: CPT | Performed by: STUDENT IN AN ORGANIZED HEALTH CARE EDUCATION/TRAINING PROGRAM

## 2024-05-05 PROCEDURE — 82947 ASSAY GLUCOSE BLOOD QUANT: CPT | Mod: 91,MUE

## 2024-05-05 PROCEDURE — 82435 ASSAY OF BLOOD CHLORIDE: CPT | Performed by: STUDENT IN AN ORGANIZED HEALTH CARE EDUCATION/TRAINING PROGRAM

## 2024-05-05 PROCEDURE — 2500000001 HC RX 250 WO HCPCS SELF ADMINISTERED DRUGS (ALT 637 FOR MEDICARE OP): Performed by: STUDENT IN AN ORGANIZED HEALTH CARE EDUCATION/TRAINING PROGRAM

## 2024-05-05 PROCEDURE — 2500000002 HC RX 250 W HCPCS SELF ADMINISTERED DRUGS (ALT 637 FOR MEDICARE OP, ALT 636 FOR OP/ED): Performed by: STUDENT IN AN ORGANIZED HEALTH CARE EDUCATION/TRAINING PROGRAM

## 2024-05-05 PROCEDURE — 36415 COLL VENOUS BLD VENIPUNCTURE: CPT | Performed by: STUDENT IN AN ORGANIZED HEALTH CARE EDUCATION/TRAINING PROGRAM

## 2024-05-05 PROCEDURE — 82947 ASSAY GLUCOSE BLOOD QUANT: CPT | Mod: 91

## 2024-05-05 PROCEDURE — 2500000005 HC RX 250 GENERAL PHARMACY W/O HCPCS

## 2024-05-05 PROCEDURE — 99221 1ST HOSP IP/OBS SF/LOW 40: CPT | Performed by: INTERNAL MEDICINE

## 2024-05-05 PROCEDURE — 76776 US EXAM K TRANSPL W/DOPPLER: CPT | Performed by: STUDENT IN AN ORGANIZED HEALTH CARE EDUCATION/TRAINING PROGRAM

## 2024-05-05 PROCEDURE — 82435 ASSAY OF BLOOD CHLORIDE: CPT

## 2024-05-05 PROCEDURE — 83735 ASSAY OF MAGNESIUM: CPT | Performed by: STUDENT IN AN ORGANIZED HEALTH CARE EDUCATION/TRAINING PROGRAM

## 2024-05-05 PROCEDURE — 1200000002 HC GENERAL ROOM WITH TELEMETRY DAILY

## 2024-05-05 PROCEDURE — 86022 PLATELET ANTIBODIES: CPT | Performed by: STUDENT IN AN ORGANIZED HEALTH CARE EDUCATION/TRAINING PROGRAM

## 2024-05-05 PROCEDURE — 83605 ASSAY OF LACTIC ACID: CPT | Mod: MUE | Performed by: STUDENT IN AN ORGANIZED HEALTH CARE EDUCATION/TRAINING PROGRAM

## 2024-05-05 PROCEDURE — 85025 COMPLETE CBC W/AUTO DIFF WBC: CPT | Performed by: STUDENT IN AN ORGANIZED HEALTH CARE EDUCATION/TRAINING PROGRAM

## 2024-05-05 PROCEDURE — 2500000004 HC RX 250 GENERAL PHARMACY W/ HCPCS (ALT 636 FOR OP/ED)

## 2024-05-05 PROCEDURE — 76776 US EXAM K TRANSPL W/DOPPLER: CPT

## 2024-05-05 PROCEDURE — 70450 CT HEAD/BRAIN W/O DYE: CPT

## 2024-05-05 PROCEDURE — 86832 HLA CLASS I HIGH DEFIN QUAL: CPT | Performed by: STUDENT IN AN ORGANIZED HEALTH CARE EDUCATION/TRAINING PROGRAM

## 2024-05-05 PROCEDURE — 2500000006 HC RX 250 W HCPCS SELF ADMINISTERED DRUGS (ALT 637 FOR ALL PAYERS)

## 2024-05-05 PROCEDURE — 84132 ASSAY OF SERUM POTASSIUM: CPT | Mod: 91 | Performed by: STUDENT IN AN ORGANIZED HEALTH CARE EDUCATION/TRAINING PROGRAM

## 2024-05-05 RX ORDER — ASPIRIN 325 MG
325 TABLET, DELAYED RELEASE (ENTERIC COATED) ORAL DAILY
Status: DISCONTINUED | OUTPATIENT
Start: 2024-05-05 | End: 2024-05-09 | Stop reason: HOSPADM

## 2024-05-05 RX ORDER — PREDNISONE 5 MG/1
5 TABLET ORAL DAILY
Status: DISCONTINUED | OUTPATIENT
Start: 2024-05-05 | End: 2024-05-09 | Stop reason: HOSPADM

## 2024-05-05 RX ORDER — INSULIN LISPRO 100 [IU]/ML
0-5 INJECTION, SOLUTION INTRAVENOUS; SUBCUTANEOUS
Status: DISCONTINUED | OUTPATIENT
Start: 2024-05-05 | End: 2024-05-09 | Stop reason: HOSPADM

## 2024-05-05 RX ORDER — POLYETHYLENE GLYCOL 3350 17 G/17G
17 POWDER, FOR SOLUTION ORAL DAILY
Status: DISCONTINUED | OUTPATIENT
Start: 2024-05-05 | End: 2024-05-08

## 2024-05-05 RX ORDER — DAPAGLIFLOZIN 10 MG/1
10 TABLET, FILM COATED ORAL DAILY
Status: DISCONTINUED | OUTPATIENT
Start: 2024-05-05 | End: 2024-05-07

## 2024-05-05 RX ORDER — CARVEDILOL 12.5 MG/1
50 TABLET ORAL 2 TIMES DAILY
Status: DISCONTINUED | OUTPATIENT
Start: 2024-05-05 | End: 2024-05-09 | Stop reason: HOSPADM

## 2024-05-05 RX ORDER — MYCOPHENOLATE MOFETIL 250 MG/1
750 CAPSULE ORAL 2 TIMES DAILY
Status: DISCONTINUED | OUTPATIENT
Start: 2024-05-05 | End: 2024-05-05

## 2024-05-05 RX ORDER — CEFTRIAXONE 1 G/50ML
1 INJECTION, SOLUTION INTRAVENOUS EVERY 24 HOURS
Status: DISCONTINUED | OUTPATIENT
Start: 2024-05-05 | End: 2024-05-08

## 2024-05-05 RX ORDER — HEPARIN SODIUM 5000 [USP'U]/ML
5000 INJECTION, SOLUTION INTRAVENOUS; SUBCUTANEOUS EVERY 8 HOURS
Status: DISCONTINUED | OUTPATIENT
Start: 2024-05-05 | End: 2024-05-09 | Stop reason: HOSPADM

## 2024-05-05 RX ORDER — HYDRALAZINE HYDROCHLORIDE 50 MG/1
100 TABLET, FILM COATED ORAL 3 TIMES DAILY
Status: DISCONTINUED | OUTPATIENT
Start: 2024-05-05 | End: 2024-05-09 | Stop reason: HOSPADM

## 2024-05-05 RX ORDER — MYCOPHENOLATE MOFETIL 500 MG/1
250 TABLET ORAL 2 TIMES DAILY
Status: DISCONTINUED | OUTPATIENT
Start: 2024-05-05 | End: 2024-05-09 | Stop reason: HOSPADM

## 2024-05-05 RX ORDER — ATORVASTATIN CALCIUM 10 MG/1
10 TABLET, FILM COATED ORAL NIGHTLY
Status: DISCONTINUED | OUTPATIENT
Start: 2024-05-05 | End: 2024-05-09 | Stop reason: HOSPADM

## 2024-05-05 RX ORDER — CHOLECALCIFEROL (VITAMIN D3) 25 MCG
2000 TABLET ORAL DAILY
Status: DISCONTINUED | OUTPATIENT
Start: 2024-05-05 | End: 2024-05-09 | Stop reason: HOSPADM

## 2024-05-05 RX ORDER — SODIUM CHLORIDE, SODIUM LACTATE, POTASSIUM CHLORIDE, CALCIUM CHLORIDE 600; 310; 30; 20 MG/100ML; MG/100ML; MG/100ML; MG/100ML
75 INJECTION, SOLUTION INTRAVENOUS CONTINUOUS
Status: DISCONTINUED | OUTPATIENT
Start: 2024-05-05 | End: 2024-05-07

## 2024-05-05 RX ADMIN — HYDRALAZINE HYDROCHLORIDE 100 MG: 25 TABLET ORAL at 21:45

## 2024-05-05 RX ADMIN — TACROLIMUS 1.5 MG: 1 CAPSULE ORAL at 09:04

## 2024-05-05 RX ADMIN — CARVEDILOL 50 MG: 12.5 TABLET, FILM COATED ORAL at 09:04

## 2024-05-05 RX ADMIN — POLYETHYLENE GLYCOL 3350 17 G: 17 POWDER, FOR SOLUTION ORAL at 09:05

## 2024-05-05 RX ADMIN — MYCOPHENOLATE MOFETIL 750 MG: 250 CAPSULE ORAL at 09:04

## 2024-05-05 RX ADMIN — INSULIN LISPRO 5 UNITS: 100 INJECTION, SOLUTION INTRAVENOUS; SUBCUTANEOUS at 09:05

## 2024-05-05 RX ADMIN — CEFTRIAXONE SODIUM 1 G: 1 INJECTION, SOLUTION INTRAVENOUS at 10:36

## 2024-05-05 RX ADMIN — PREDNISONE 5 MG: 5 TABLET ORAL at 09:04

## 2024-05-05 RX ADMIN — SODIUM CHLORIDE, POTASSIUM CHLORIDE, SODIUM LACTATE AND CALCIUM CHLORIDE 75 ML/HR: 600; 310; 30; 20 INJECTION, SOLUTION INTRAVENOUS at 13:11

## 2024-05-05 RX ADMIN — HYDRALAZINE HYDROCHLORIDE 100 MG: 25 TABLET ORAL at 15:02

## 2024-05-05 RX ADMIN — Medication 2000 UNITS: at 09:05

## 2024-05-05 RX ADMIN — SODIUM CHLORIDE, POTASSIUM CHLORIDE, SODIUM LACTATE AND CALCIUM CHLORIDE 1000 ML: 600; 310; 30; 20 INJECTION, SOLUTION INTRAVENOUS at 04:23

## 2024-05-05 RX ADMIN — INSULIN GLARGINE 15 UNITS: 100 INJECTION, SOLUTION SUBCUTANEOUS at 21:47

## 2024-05-05 RX ADMIN — HEPARIN SODIUM 5000 UNITS: 5000 INJECTION INTRAVENOUS; SUBCUTANEOUS at 04:23

## 2024-05-05 RX ADMIN — SODIUM PHOSPHATE, MONOBASIC, MONOHYDRATE AND SODIUM PHOSPHATE, DIBASIC, ANHYDROUS 21 MMOL: 142; 276 INJECTION, SOLUTION INTRAVENOUS at 21:40

## 2024-05-05 RX ADMIN — INSULIN LISPRO 3 UNITS: 100 INJECTION, SOLUTION INTRAVENOUS; SUBCUTANEOUS at 12:34

## 2024-05-05 RX ADMIN — ATORVASTATIN CALCIUM 10 MG: 20 TABLET, FILM COATED ORAL at 21:45

## 2024-05-05 RX ADMIN — CARVEDILOL 50 MG: 12.5 TABLET, FILM COATED ORAL at 21:45

## 2024-05-05 RX ADMIN — INSULIN HUMAN 10 UNITS: 100 INJECTION, SUSPENSION SUBCUTANEOUS at 10:36

## 2024-05-05 RX ADMIN — SODIUM ZIRCONIUM CYCLOSILICATE 10 G: 10 POWDER, FOR SUSPENSION ORAL at 06:38

## 2024-05-05 RX ADMIN — INSULIN LISPRO 2 UNITS: 100 INJECTION, SOLUTION INTRAVENOUS; SUBCUTANEOUS at 18:33

## 2024-05-05 NOTE — CONSULTS
Reason For Consult  S/p DDKT, SHWETA, immunosuppression management    History Of Present Illness  Jacklyn Villa is a 73 y.o. 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 Rivera, KDPI 78%, PRA 96%. HCV -/ R -, HBcAb D+/R HBsAb+ (on Vemlidy, never seroconverted), CMV D+/R+, EBV D+ / R-). Left donor kidney, transplanted to patient right pelvis. H/o DGF post operatively needing dialysis once due to hyperkalemia with eventual renal recovery. Other PMH: hyperlipidemia, diverticulosis    Post-txp course notable for CMV viremia, on Valcyte treatment dose. Recent CMV PCR neg 4/8/23 (from 36 10/2023). Peak CMV  int units/ml 1/2023.     Last seen in txp clinic 11/2023.     Presented to ER 5/4 pm with lethargy, generalized weakness, loss of appetite, and diarrhea. Labs at presentation with severe hyperglycemia, elevated anion gap, possible DKA, SHWTEA with Cr 1.68 (baseline Cr 0.9-1, last stable 3/11/24).     UA with microscopic hematuria, mod pyuria, 2+ bacteruria, 2+ pro. Ucx, blood cx pending. Started on ceftriaxone this am.     Unable to obtain complete history from pt as she remains confused. No abd imaging ordered. Fk level undetectable (<2) this am.    Pt reports she stopped taking all her meds for 'few weeks' as she has not been feeling well. Unable to corroborate as no family at bedside.    Serum Cr trending down with IV fluids and glycemic control.     Dapa on hold. On IVF LR 75 ml/h    Tac 1.5mg q12, pred 5mg/d resumed. MMF reduced to 250mg bid (home dose 500mg bid)    Unable to obtain ROS sec to clinical status.    Past Medical History  She has a past medical history of Asymptomatic menopausal state (06/30/2021), Chronic kidney disease, stage 3 unspecified (Multi) (09/16/2022), Chronic kidney disease, stage 4 (severe) (Multi) (10/17/2019), Combined forms of age-related cataract, left eye (07/08/2020), Combined forms of age-related cataract, right eye  (07/08/2020), Encounter for gynecological examination (general) (routine) without abnormal findings (11/08/2021), Encounter for immunization (02/09/2017), Encounter for immunization (02/09/2017), Encounter for other preprocedural examination (09/28/2021), Encounter for screening for malignant neoplasm of cervix (05/21/2018), Encounter for screening for malignant neoplasm of colon (11/21/2019), Encounter for screening for respiratory tuberculosis (01/21/2021), Impacted cerumen, right ear (02/11/2016), Need for assistance at home and no other household member able to render care, Personal history of other diseases of the circulatory system, Personal history of other diseases of the female genital tract (05/21/2018), Personal history of other diseases of the nervous system and sense organs (01/28/2020), Personal history of other diseases of the nervous system and sense organs (02/11/2016), Personal history of other diseases of the nervous system and sense organs (02/11/2016), Personal history of other diseases of the respiratory system (01/28/2020), Personal history of other drug therapy (11/09/2017), Personal history of other endocrine, nutritional and metabolic disease (08/19/2022), Personal history of other endocrine, nutritional and metabolic disease (09/09/2022), Personal history of other endocrine, nutritional and metabolic disease (06/30/2021), Personal history of other medical treatment (06/29/2021), Personal history of other medical treatment (09/12/2019), Type 1 diabetes mellitus with hypoglycemia with coma (Multi) (05/27/2022), and Type 2 diabetes mellitus without complications (Multi) (01/16/2018).    Surgical History  She has a past surgical history that includes Other surgical history (09/15/2022); Other surgical history (06/05/2020); Other surgical history (06/05/2020); and Other surgical history (10/17/2019).     Social History  She reports that she has never smoked. She has never used smokeless  "tobacco. She reports that she does not drink alcohol and does not use drugs.    Family History  Family History   Problem Relation Name Age of Onset    Ovarian cancer Mother      Hypertension Sister          Allergies  Adhesive tape-silicones and Amoxicillin    Scheduled medications  [Held by provider] aspirin, 325 mg, oral, Daily  atorvastatin, 10 mg, oral, Nightly  carvedilol, 50 mg, oral, BID  cefTRIAXone, 1 g, intravenous, q24h  cholecalciferol, 2,000 Units, oral, Daily  [Held by provider] dapagliflozin propanediol, 10 mg, oral, Daily  [Held by provider] heparin (porcine), 5,000 Units, subcutaneous, q8h  hydrALAZINE, 100 mg, oral, TID  insulin glargine, 15 Units, subcutaneous, Nightly  insulin lispro, 0-5 Units, subcutaneous, TID with meals  mycophenolate, 250 mg, oral, BID  polyethylene glycol, 17 g, oral, Daily  potassium phosphate, 21 mmol, intravenous, Once  predniSONE, 5 mg, oral, Daily  tacrolimus, 1.5 mg, oral, q12h      Continuous medications  lactated Ringer's, 75 mL/hr, Last Rate: 75 mL/hr (05/05/24 1311)      PRN medications  PRN medications: dextrose, dextrose, glucagon, glucagon       Last Recorded Vitals  Blood pressure 168/67, pulse 100, temperature 37 °C (98.6 °F), temperature source Oral, resp. rate 17, height 1.651 m (5' 5\"), weight 95.3 kg (210 lb), SpO2 97%.    Alert, oriented to person and place, no distress  Somnolent but arousable  Lungs with equal air entry, no added sounds  Rrr, no r/g. 3/6   Abd soft, nt, nd  No allograft tenderness  No edema b/l    Results for orders placed or performed during the hospital encounter of 05/04/24 (from the past 24 hour(s))   POCT GLUCOSE   Result Value Ref Range    POCT Glucose 515 (H) 74 - 99 mg/dL   ECG 12 lead   Result Value Ref Range    Ventricular Rate 109 BPM    Atrial Rate 109 BPM    OH Interval 152 ms    QRS Duration 78 ms    QT Interval 298 ms    QTC Calculation(Bazett) 401 ms    P Axis 59 degrees    R Axis 59 degrees    T Axis 16 degrees    QRS " Count 18 beats    Q Onset 216 ms    P Onset 140 ms    P Offset 202 ms    T Offset 365 ms    QTC Fredericia 363 ms   BLOOD GAS VENOUS FULL PANEL   Result Value Ref Range    POCT pH, Venous 7.32 (L) 7.33 - 7.43 pH    POCT pCO2, Venous 42 41 - 51 mm Hg    POCT pO2, Venous 37 35 - 45 mm Hg    POCT SO2, Venous 55 45 - 75 %    POCT Oxy Hemoglobin, Venous 53.9 45.0 - 75.0 %    POCT Hematocrit Calculated, Venous 40.0 36.0 - 46.0 %    POCT Sodium, Venous 124 (L) 136 - 145 mmol/L    POCT Potassium, Venous 5.5 (H) 3.5 - 5.3 mmol/L    POCT Chloride, Venous 92 (L) 98 - 107 mmol/L    POCT Ionized Calicum, Venous 1.33 1.10 - 1.33 mmol/L    POCT Glucose, Venous 551 (HH) 74 - 99 mg/dL    POCT Lactate, Venous 1.9 0.4 - 2.0 mmol/L    POCT Base Excess, Venous -4.3 (L) -2.0 - 3.0 mmol/L    POCT HCO3 Calculated, Venous 21.6 (L) 22.0 - 26.0 mmol/L    POCT Hemoglobin, Venous 13.3 12.0 - 16.0 g/dL    POCT Anion Gap, Venous 16.0 10.0 - 25.0 mmol/L    Patient Temperature 37.0 degrees Celsius    FiO2 21 %   CBC and Auto Differential   Result Value Ref Range    WBC 5.9 4.4 - 11.3 x10*3/uL    nRBC 0.0 0.0 - 0.0 /100 WBCs    RBC 4.74 4.00 - 5.20 x10*6/uL    Hemoglobin 12.9 12.0 - 16.0 g/dL    Hematocrit 39.3 36.0 - 46.0 %    MCV 83 80 - 100 fL    MCH 27.2 26.0 - 34.0 pg    MCHC 32.8 32.0 - 36.0 g/dL    RDW 13.9 11.5 - 14.5 %    Platelets 108 (L) 150 - 450 x10*3/uL    Neutrophils % 74.4 40.0 - 80.0 %    Immature Granulocytes %, Automated 0.7 0.0 - 0.9 %    Lymphocytes % 3.9 13.0 - 44.0 %    Monocytes % 20.5 2.0 - 10.0 %    Eosinophils % 0.0 0.0 - 6.0 %    Basophils % 0.5 0.0 - 2.0 %    Neutrophils Absolute 4.35 1.60 - 5.50 x10*3/uL    Immature Granulocytes Absolute, Automated 0.04 0.00 - 0.50 x10*3/uL    Lymphocytes Absolute 0.23 (L) 0.80 - 3.00 x10*3/uL    Monocytes Absolute 1.20 (H) 0.05 - 0.80 x10*3/uL    Eosinophils Absolute 0.00 0.00 - 0.40 x10*3/uL    Basophils Absolute 0.03 0.00 - 0.10 x10*3/uL   Comprehensive metabolic panel   Result Value  Ref Range    Glucose 537 (HH) 74 - 99 mg/dL    Sodium 127 (L) 136 - 145 mmol/L    Potassium 4.9 3.5 - 5.3 mmol/L    Chloride 92 (L) 98 - 107 mmol/L    Bicarbonate 20 (L) 21 - 32 mmol/L    Anion Gap 20 10 - 20 mmol/L    Urea Nitrogen 45 (H) 6 - 23 mg/dL    Creatinine 1.68 (H) 0.50 - 1.05 mg/dL    eGFR 32 (L) >60 mL/min/1.73m*2    Calcium 9.5 8.6 - 10.6 mg/dL    Albumin 3.3 (L) 3.4 - 5.0 g/dL    Alkaline Phosphatase 77 33 - 136 U/L    Total Protein 6.0 (L) 6.4 - 8.2 g/dL    AST 51 (H) 9 - 39 U/L    Bilirubin, Total 0.7 0.0 - 1.2 mg/dL    ALT 42 7 - 45 U/L   Bilirubin, Direct   Result Value Ref Range    Bilirubin, Direct 0.2 0.0 - 0.3 mg/dL   Sedimentation rate, automated   Result Value Ref Range    Sedimentation Rate >130 (H) 0 - 30 mm/h   Hemoglobin A1c   Result Value Ref Range    Hemoglobin A1C 6.8 (H) see below %    Estimated Average Glucose 148 Not Established mg/dL   SST TOP   Result Value Ref Range    Extra Tube Hold for add-ons.    Osmolality   Result Value Ref Range    Osmolality, Serum 315 (H) 280 - 300 mOsm/kg   Urinalysis with Reflex Microscopic   Result Value Ref Range    Color, Urine Yellow Light-Yellow, Yellow, Dark-Yellow    Appearance, Urine Turbid (N) Clear    Specific Gravity, Urine 1.024 1.005 - 1.035    pH, Urine 5.5 5.0, 5.5, 6.0, 6.5, 7.0, 7.5, 8.0    Protein, Urine 70 (1+) (A) NEGATIVE, 10 (TRACE), 20 (TRACE) mg/dL    Glucose, Urine OVER (4+) (A) Normal mg/dL    Blood, Urine 0.5 (2+) (A) NEGATIVE    Ketones, Urine 20 (1+) (A) NEGATIVE mg/dL    Bilirubin, Urine NEGATIVE NEGATIVE    Urobilinogen, Urine Normal Normal mg/dL    Nitrite, Urine NEGATIVE NEGATIVE    Leukocyte Esterase, Urine 250 Tita/µL (A) NEGATIVE   Microscopic Only, Urine   Result Value Ref Range    WBC, Urine 21-50 (A) 1-5, NONE /HPF    RBC, Urine 6-10 (A) NONE, 1-2, 3-5 /HPF    Squamous Epithelial Cells, Urine 1-9 (SPARSE) Reference range not established. /HPF    Bacteria, Urine 2+ (A) NONE SEEN /HPF    Mucus, Urine FEW Reference  range not established. /LPF   Urinalysis with Reflex Culture and Microscopic   Result Value Ref Range    Color, Urine Yellow Light-Yellow, Yellow, Dark-Yellow    Appearance, Urine Turbid (N) Clear    Specific Gravity, Urine 1.024 1.005 - 1.035    pH, Urine 5.5 5.0, 5.5, 6.0, 6.5, 7.0, 7.5, 8.0    Protein, Urine 100 (2+) (A) NEGATIVE, 10 (TRACE), 20 (TRACE) mg/dL    Glucose, Urine OVER (4+) (A) Normal mg/dL    Blood, Urine 0.5 (2+) (A) NEGATIVE    Ketones, Urine 20 (1+) (A) NEGATIVE mg/dL    Bilirubin, Urine NEGATIVE NEGATIVE    Urobilinogen, Urine Normal Normal mg/dL    Nitrite, Urine NEGATIVE NEGATIVE    Leukocyte Esterase, Urine 250 Tita/µL (A) NEGATIVE   Extra Urine Gray Tube   Result Value Ref Range    Extra Tube Hold for add-ons.    Lipase   Result Value Ref Range    Lipase 122 (H) 9 - 82 U/L   Protime-INR   Result Value Ref Range    Protime 13.2 (H) 9.8 - 12.8 seconds    INR 1.2 (H) 0.9 - 1.1   Microscopic Only, Urine   Result Value Ref Range    WBC, Urine 21-50 (A) 1-5, NONE /HPF    RBC, Urine 11-20 (A) NONE, 1-2, 3-5 /HPF    Squamous Epithelial Cells, Urine 1-9 (SPARSE) Reference range not established. /HPF    Bacteria, Urine 2+ (A) NONE SEEN /HPF    Budding Yeast, Urine PRESENT (A) NONE /HPF    Mucus, Urine FEW Reference range not established. /LPF   POCT GLUCOSE   Result Value Ref Range    POCT Glucose 477 (H) 74 - 99 mg/dL   POCT GLUCOSE   Result Value Ref Range    POCT Glucose 390 (H) 74 - 99 mg/dL   Blood Gas Venous Full Panel Unsolicited   Result Value Ref Range    POCT pH, Venous 7.31 (L) 7.33 - 7.43 pH    POCT pCO2, Venous 50 41 - 51 mm Hg    POCT pO2, Venous 25 (L) 35 - 45 mm Hg    POCT SO2, Venous 30 (L) 45 - 75 %    POCT Oxy Hemoglobin, Venous 29.6 (L) 45.0 - 75.0 %    POCT Hematocrit Calculated, Venous 38.0 36.0 - 46.0 %    POCT Sodium, Venous 126 (L) 136 - 145 mmol/L    POCT Potassium, Venous 6.4 (HH) 3.5 - 5.3 mmol/L    POCT Chloride, Venous 95 (L) 98 - 107 mmol/L    POCT Ionized Calicum, Venous  1.26 1.10 - 1.33 mmol/L    POCT Glucose, Venous 379 (H) 74 - 99 mg/dL    POCT Lactate, Venous 3.0 (H) 0.4 - 2.0 mmol/L    POCT Base Excess, Venous -1.6 -2.0 - 3.0 mmol/L    POCT HCO3 Calculated, Venous 25.2 22.0 - 26.0 mmol/L    POCT Hemoglobin, Venous 12.6 12.0 - 16.0 g/dL    POCT Anion Gap, Venous 12.0 10.0 - 25.0 mmol/L    Patient Temperature 37.0 degrees Celsius   Blood Gas Venous Full Panel Unsolicited   Result Value Ref Range    POCT pH, Venous 7.33 7.33 - 7.43 pH    POCT pCO2, Venous 50 41 - 51 mm Hg    POCT pO2, Venous 26 (L) 35 - 45 mm Hg    POCT SO2, Venous 34 (L) 45 - 75 %    POCT Oxy Hemoglobin, Venous 33.2 (L) 45.0 - 75.0 %    POCT Hematocrit Calculated, Venous 38.0 36.0 - 46.0 %    POCT Sodium, Venous 128 (L) 136 - 145 mmol/L    POCT Potassium, Venous 5.9 (H) 3.5 - 5.3 mmol/L    POCT Chloride, Venous 96 (L) 98 - 107 mmol/L    POCT Ionized Calicum, Venous 1.33 1.10 - 1.33 mmol/L    POCT Glucose, Venous 376 (H) 74 - 99 mg/dL    POCT Lactate, Venous 2.2 (H) 0.4 - 2.0 mmol/L    POCT Base Excess, Venous -0.2 -2.0 - 3.0 mmol/L    POCT HCO3 Calculated, Venous 26.4 (H) 22.0 - 26.0 mmol/L    POCT Hemoglobin, Venous 12.5 12.0 - 16.0 g/dL    POCT Anion Gap, Venous 12.0 10.0 - 25.0 mmol/L    Patient Temperature 37.0 degrees Celsius   Renal Function Panel   Result Value Ref Range    Glucose 349 (H) 74 - 99 mg/dL    Sodium 130 (L) 136 - 145 mmol/L    Potassium 5.3 3.5 - 5.3 mmol/L    Chloride 98 98 - 107 mmol/L    Bicarbonate 17 (L) 21 - 32 mmol/L    Anion Gap 20 10 - 20 mmol/L    Urea Nitrogen 40 (H) 6 - 23 mg/dL    Creatinine 1.55 (H) 0.50 - 1.05 mg/dL    eGFR 36 (L) >60 mL/min/1.73m*2    Calcium 9.3 8.6 - 10.6 mg/dL    Phosphorus 2.2 (L) 2.5 - 4.9 mg/dL    Albumin 3.1 (L) 3.4 - 5.0 g/dL   Lactate   Result Value Ref Range    Lactate 1.6 0.4 - 2.0 mmol/L   C-reactive protein   Result Value Ref Range    C-Reactive Protein 34.18 (H) <1.00 mg/dL   Blood Culture    Specimen: Peripheral Venipuncture; Blood culture    Result Value Ref Range    Blood Culture Loaded on Instrument - Culture in progress    CBC and Auto Differential   Result Value Ref Range    WBC 5.4 4.4 - 11.3 x10*3/uL    nRBC 0.0 0.0 - 0.0 /100 WBCs    RBC 4.48 4.00 - 5.20 x10*6/uL    Hemoglobin 12.5 12.0 - 16.0 g/dL    Hematocrit 36.6 36.0 - 46.0 %    MCV 82 80 - 100 fL    MCH 27.9 26.0 - 34.0 pg    MCHC 34.2 32.0 - 36.0 g/dL    RDW 13.8 11.5 - 14.5 %    Platelets 104 (L) 150 - 450 x10*3/uL    Neutrophils % 76.2 40.0 - 80.0 %    Immature Granulocytes %, Automated 1.1 (H) 0.0 - 0.9 %    Lymphocytes % 3.3 13.0 - 44.0 %    Monocytes % 18.8 2.0 - 10.0 %    Eosinophils % 0.0 0.0 - 6.0 %    Basophils % 0.6 0.0 - 2.0 %    Neutrophils Absolute 4.13 1.60 - 5.50 x10*3/uL    Immature Granulocytes Absolute, Automated 0.06 0.00 - 0.50 x10*3/uL    Lymphocytes Absolute 0.18 (L) 0.80 - 3.00 x10*3/uL    Monocytes Absolute 1.02 (H) 0.05 - 0.80 x10*3/uL    Eosinophils Absolute 0.00 0.00 - 0.40 x10*3/uL    Basophils Absolute 0.03 0.00 - 0.10 x10*3/uL   Tacrolimus level   Result Value Ref Range    Tacrolimus  <2.0 <=15.0 ng/mL   Basic metabolic panel   Result Value Ref Range    Glucose 308 (H) 74 - 99 mg/dL    Sodium 128 (L) 136 - 145 mmol/L    Potassium 5.9 (H) 3.5 - 5.3 mmol/L    Chloride 97 (L) 98 - 107 mmol/L    Bicarbonate 22 21 - 32 mmol/L    Anion Gap 15 10 - 20 mmol/L    Urea Nitrogen 41 (H) 6 - 23 mg/dL    Creatinine 1.53 (H) 0.50 - 1.05 mg/dL    eGFR 36 (L) >60 mL/min/1.73m*2    Calcium 9.6 8.6 - 10.6 mg/dL   Hepatic Function Panel   Result Value Ref Range    Albumin 3.5 3.4 - 5.0 g/dL    Bilirubin, Total 0.8 0.0 - 1.2 mg/dL    Bilirubin, Direct 0.2 0.0 - 0.3 mg/dL    Alkaline Phosphatase 92 33 - 136 U/L    ALT 35 7 - 45 U/L    AST 52 (H) 9 - 39 U/L    Total Protein 6.7 6.4 - 8.2 g/dL   Magnesium   Result Value Ref Range    Magnesium 2.29 1.60 - 2.40 mg/dL   POCT GLUCOSE   Result Value Ref Range    POCT Glucose 364 (H) 74 - 99 mg/dL   POCT GLUCOSE   Result Value Ref Range     POCT Glucose 265 (H) 74 - 99 mg/dL   Renal function panel   Result Value Ref Range    Glucose 284 (H) 74 - 99 mg/dL    Sodium 132 (L) 136 - 145 mmol/L    Potassium 4.7 3.5 - 5.3 mmol/L    Chloride 97 (L) 98 - 107 mmol/L    Bicarbonate 27 21 - 32 mmol/L    Anion Gap 13 10 - 20 mmol/L    Urea Nitrogen 37 (H) 6 - 23 mg/dL    Creatinine 1.20 (H) 0.50 - 1.05 mg/dL    eGFR 48 (L) >60 mL/min/1.73m*2    Calcium 10.0 8.6 - 10.6 mg/dL    Phosphorus 2.0 (L) 2.5 - 4.9 mg/dL    Albumin 3.2 (L) 3.4 - 5.0 g/dL          Assessment/Plan     73 y.o. 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 Rivera, KDPI 78%, PRA 96%. HCV -/ R -, HBcAb D+/R HBsAb+ on Vemlidy,CMV D+/R+, EBV D+ / R-). Left donor kidney, transplanted to patient right pelvis. H/o DGF post operatively needing dialysis once due to hyperkalemia with eventual renal recovery. Other PMH: hyperlipidemia, diverticulosis    Admitted with lethargy, generalized weakness, loss of appetite, and diarrhea. Reports skipping all meds for several days. Labs notable for hyperglycemia, mild DKA, SHWETA.     Allograft function: s/p DDKT 8/2022  - baseline Cr 0.9-1. Admission Cr 1.68 --> 1.2 with hydration  - nonoliguric. Pls check urine lytes, Up/Uc.  - hyperkalemia improving with glycemic control, hydration. AGMA (?DKA) improved. Corrected Na acceptable. Glycemic control per primary team.   - UA with +ve LE, mod pyuria concerning for UTI. pls get txp renal US.    - Bps above goal. No hypervolemia on exam.   - Hb 12.5, acceptable.   - Ca, Phos acceptable. Monitor.   - avoid volume depletion. Agree with IV LR @ 75 ml/h.   - avoid potential nephrotoxins    Immunosuppression:  - resume tac 1.5mg q12. Fk level this am <2 (h/o skipping meds for several days)  - resume MMF at 250 mg bid (concern for infection), pred 5mg/d.  - monitor FK trough daily AM  - pls check DSA. Last DSA 11/2022, neg.    ID:  - h/o CMV viremia: peak CMV  1/2023.  Pt has been on treatment dose Valcyte several months. Last CMV PCR neg 4/8/24. Rpt pending.   - UA concerning for UTI/pyelo. On ceftriaxone. Follow Ucx, tailor abx as indicated. Blood cx pending.   - Encephalopathy: ?metabolic vs infection. Monitor    Rest of the management per primary team. Will follow.    Preet Flower MD

## 2024-05-05 NOTE — SIGNIFICANT EVENT
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 Labs on presentation notable for hyperglycemia with borderline anion gap c/f DKA, as well as hyponatremia (although Na corrected for BG is 134). UA is notable for leukocyte esterase and WBCs. At baseline, the pt is AOX4, drives, manages her own medications, and is independent for her ADLs. Per son, since moving in a week ago, she has been \"shaky\", had worsening diarrhea, and \"slower with responses\". Unclear if she has been taking her medications at this time. Given her baseline and current AMS, was started on CTX. Transplant nephrology is following, appreciate recs. Will send CMV PCR as well given hx of CMV viremia.         #DKA - resolved   #hyperglycemia   #NIDDM2  ::home meds: farxiga 10 mg, ozempic 2 mg, lantus 15, lispro sliding scale   ::A1c 6.8 on 5/5   :: s/p 15u lantus + 8 lispro in ED  -carb controlled diet  -hold home OHAs for now  -c/w mild ISS and qACHS glucose checks  -c/w 15u lantus daily + ISS - will adjust as necessary.   - 10u NPH now  - LR 7cc/hr continuous fluids    #AMS  #UTI  :: Per collateral from family - pt baseline aox3, independent for all ADLs, drives car, manages medications  :: Currently oriented x3 but very drowsy, slow to respond, confused  :: UA 2+ bacteria, + LE, - nitrites  :: CT head non-con: no evidence of acute hemorrhage/mass lesion/acute infarction  - Started on CTX     #ESRD s/p DDRT 8/2022   #CMV viremia   #SHWETA - improving  ::per transplant nephrology notes, patient should be on 500 mg MMF BID, tacrolimus 1.5 mg BID, and 5 mg prednisone  :: Cr on admission 1.68, baseline ~1 -> improving after fluids, likely pre-renal. Pt having diarrhea, frequent urination  :: s/p 3L LR, on 75 ml LR/hr  -clarify " aspirin 325 rx with transplant; prior rx appears to have come from transplant, unclear if patient should be on it   -previously on vemlidy for positive HepB core ab, treatment completed   -transplant nephrology following  -check CMV PCR  -Restarted on tacrolimus 1.5mg BID, prednisone 5mg, MMF 250mg BID  -Tacrolimus trough ordered  - Renal ultrasound with doppler    #Thrombocytopenia  :: Platelets 104 from 216 on 4/2024  - Stopped sqheparin for DVT prophylaxis  - Ordered PF4, DSA     #Systolic ejection murmur   ::Cardiology notes from 2021 document grade II ESM; audible ESM heard on exam on admission so murmur is not new   -repeat TTE ordered; last TTE in 2022 suggested possible LVOT obstruction     #hypertonic hyponatremia  ::Na 127 on admission, 134 corrected for BG, serum osm 310   -s/p 3L IVF in ED   -Currently on 75cc/hr LR      #diarrhea   -check stool pathogen PCR   -CMV PCR pending   -if transplant nephrology is concerned for CMV enteritis, may require GI consultation and biopsy      #HLD  -c/w home lovastatin 10mg     #HTN   -resume home coreg 50 BID  -Restarted on home hydralazine 100mg TID     F: s/p 3L, 75cc/hr LR  E: Replete prn  N: diabetic  GI: deferred   DVT: SCD  Abx: CTX  Access: PIV  O2: ORA    CODE: FULL (confirmed on admission)  NOK: sibling Gabbie [phone]

## 2024-05-05 NOTE — H&P
"73 y.o. 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 Labs on presentation notable for hyperglycemia with borderline anion gap c/f DKA.      Patient's son reported that she has been sleeping excessively, has been “shaky” and has not had much appetite for the last 3 days. She had watery diarrhea during this time. She just moved into the son's house and he feels she is “slower with responses.” She has also been thirsty but has been drinking soda instead of water to hydrate. Her Dexcom has not been working, and she has not been taking her medications; she cannot recall the last time she did take her meds. Denied n/v, headache, fever, vision changes, chills or other concerning symptoms to ED provider.     On my assessment, she is Aox3 but seems to be confused, with poor history (she reports that she came in because \"I was walking and I was real slow so I got nervous\" but is unable to give me much further history).  She cannot confirm her medication list nor tell me how long she has been noncompliant with her meds. She is notably tremulous but denies any sensation of chills or any concerning symptoms. Her son brought her in but I was unable to find contact info for him.         ED Course:  VS:   ED Triage Vitals   Temperature Heart Rate Respirations BP   05/04/24 1918 05/04/24 1918 05/04/24 1918 05/04/24 1918   37 °C (98.6 °F) (!) 104 16 161/72      Pulse Ox Temp Source Heart Rate Source Patient Position   05/04/24 1918 05/04/24 1918 05/04/24 1918 05/05/24 0015   96 % Oral Monitor Lying      BP Location FiO2 (%)     05/05/24 0015 05/04/24 2130     Right arm 21 %         Labs:   Labs Reviewed   CBC WITH AUTO DIFFERENTIAL - Abnormal       Result Value    WBC 5.9      nRBC 0.0      RBC 4.74      " Hemoglobin 12.9      Hematocrit 39.3      MCV 83      MCH 27.2      MCHC 32.8      RDW 13.9      Platelets 108 (*)     Neutrophils % 74.4      Immature Granulocytes %, Automated 0.7      Lymphocytes % 3.9      Monocytes % 20.5      Eosinophils % 0.0      Basophils % 0.5      Neutrophils Absolute 4.35      Immature Granulocytes Absolute, Automated 0.04      Lymphocytes Absolute 0.23 (*)     Monocytes Absolute 1.20 (*)     Eosinophils Absolute 0.00      Basophils Absolute 0.03     COMPREHENSIVE METABOLIC PANEL - Abnormal    Glucose 537 (*)     Sodium 127 (*)     Potassium 4.9      Chloride 92 (*)     Bicarbonate 20 (*)     Anion Gap 20      Urea Nitrogen 45 (*)     Creatinine 1.68 (*)     eGFR 32 (*)     Calcium 9.5      Albumin 3.3 (*)     Alkaline Phosphatase 77      Total Protein 6.0 (*)     AST 51 (*)     Bilirubin, Total 0.7      ALT 42     URINALYSIS WITH REFLEX MICROSCOPIC - Abnormal    Color, Urine Yellow      Appearance, Urine Turbid (*)     Specific Gravity, Urine 1.024      pH, Urine 5.5      Protein, Urine 70 (1+) (*)     Glucose, Urine OVER (4+) (*)     Blood, Urine 0.5 (2+) (*)     Ketones, Urine 20 (1+) (*)     Bilirubin, Urine NEGATIVE      Urobilinogen, Urine Normal      Nitrite, Urine NEGATIVE      Leukocyte Esterase, Urine 250 Tita/µL (*)    BLOOD GAS VENOUS FULL PANEL - Abnormal    POCT pH, Venous 7.32 (*)     POCT pCO2, Venous 42      POCT pO2, Venous 37      POCT SO2, Venous 55      POCT Oxy Hemoglobin, Venous 53.9      POCT Hematocrit Calculated, Venous 40.0      POCT Sodium, Venous 124 (*)     POCT Potassium, Venous 5.5 (*)     POCT Chloride, Venous 92 (*)     POCT Ionized Calicum, Venous 1.33      POCT Glucose, Venous 551 (*)     POCT Lactate, Venous 1.9      POCT Base Excess, Venous -4.3 (*)     POCT HCO3 Calculated, Venous 21.6 (*)     POCT Hemoglobin, Venous 13.3      POCT Anion Gap, Venous 16.0      Patient Temperature 37.0      FiO2 21     URINALYSIS WITH REFLEX CULTURE AND MICROSCOPIC -  Abnormal    Color, Urine Yellow      Appearance, Urine Turbid (*)     Specific Gravity, Urine 1.024      pH, Urine 5.5      Protein, Urine 100 (2+) (*)     Glucose, Urine OVER (4+) (*)     Blood, Urine 0.5 (2+) (*)     Ketones, Urine 20 (1+) (*)     Bilirubin, Urine NEGATIVE      Urobilinogen, Urine Normal      Nitrite, Urine NEGATIVE      Leukocyte Esterase, Urine 250 Tita/µL (*)    LIPASE - Abnormal    Lipase 122 (*)     Narrative:     Venipuncture immediately after or during the administration of Metamizole may lead to falsely low results. Testing should be performed immediately prior to Metamizole dosing.   PROTIME-INR - Abnormal    Protime 13.2 (*)     INR 1.2 (*)    OSMOLALITY - Abnormal    Osmolality, Serum 315 (*)    MICROSCOPIC ONLY, URINE - Abnormal    WBC, Urine 21-50 (*)     RBC, Urine 11-20 (*)     Squamous Epithelial Cells, Urine 1-9 (SPARSE)      Bacteria, Urine 2+ (*)     Budding Yeast, Urine PRESENT (*)     Mucus, Urine FEW     MICROSCOPIC ONLY, URINE - Abnormal    WBC, Urine 21-50 (*)     RBC, Urine 6-10 (*)     Squamous Epithelial Cells, Urine 1-9 (SPARSE)      Bacteria, Urine 2+ (*)     Mucus, Urine FEW     POCT GLUCOSE - Abnormal    POCT Glucose 515 (*)    POCT GLUCOSE - Abnormal    POCT Glucose 477 (*)    POCT GLUCOSE - Abnormal    POCT Glucose 390 (*)    BLOOD GAS VENOUS FULL PANEL UNSOLICITED - Abnormal    POCT pH, Venous 7.31 (*)     POCT pCO2, Venous 50      POCT pO2, Venous 25 (*)     POCT SO2, Venous 30 (*)     POCT Oxy Hemoglobin, Venous 29.6 (*)     POCT Hematocrit Calculated, Venous 38.0      POCT Sodium, Venous 126 (*)     POCT Potassium, Venous 6.4 (*)     POCT Chloride, Venous 95 (*)     POCT Ionized Calicum, Venous 1.26      POCT Glucose, Venous 379 (*)     POCT Lactate, Venous 3.0 (*)     POCT Base Excess, Venous -1.6      POCT HCO3 Calculated, Venous 25.2      POCT Hemoglobin, Venous 12.6      POCT Anion Gap, Venous 12.0      Patient Temperature 37.0     BLOOD GAS VENOUS FULL  PANEL UNSOLICITED - Abnormal    POCT pH, Venous 7.33      POCT pCO2, Venous 50      POCT pO2, Venous 26 (*)     POCT SO2, Venous 34 (*)     POCT Oxy Hemoglobin, Venous 33.2 (*)     POCT Hematocrit Calculated, Venous 38.0      POCT Sodium, Venous 128 (*)     POCT Potassium, Venous 5.9 (*)     POCT Chloride, Venous 96 (*)     POCT Ionized Calicum, Venous 1.33      POCT Glucose, Venous 376 (*)     POCT Lactate, Venous 2.2 (*)     POCT Base Excess, Venous -0.2      POCT HCO3 Calculated, Venous 26.4 (*)     POCT Hemoglobin, Venous 12.5      POCT Anion Gap, Venous 12.0      Patient Temperature 37.0     BILIRUBIN, DIRECT - Normal    Bilirubin, Direct 0.2     URINE CULTURE   URINALYSIS WITH REFLEX CULTURE AND MICROSCOPIC    Narrative:     The following orders were created for panel order Urinalysis with Reflex Culture and Microscopic.  Procedure                               Abnormality         Status                     ---------                               -----------         ------                     Urinalysis with Reflex C...[[id number]]  Abnormal            Final result               Extra Urine Gray Tube[[id number]]                            In process                   Please view results for these tests on the individual orders.   EXTRA URINE GRAY TUBE   BLOOD GAS VENOUS FULL PANEL   BLOOD GAS LACTIC ACID, VENOUS   BLOOD GAS LACTIC ACID, VENOUS   POCT GLUCOSE   POCT GLUCOSE METER     Last CBC:  Lab Results   Component Value Date    WBC 5.9 05/04/2024    HGB 12.9 05/04/2024    HCT 39.3 05/04/2024    MCV 83 05/04/2024     (L) 05/04/2024       Last RFP:  Lab Results   Component Value Date    GLUCOSE 537 (HH) 05/04/2024    CALCIUM 9.5 05/04/2024     (L) 05/04/2024    K 4.9 05/04/2024    CO2 20 (L) 05/04/2024    CL 92 (L) 05/04/2024    BUN 45 (H) 05/04/2024    CREATININE 1.68 (H) 05/04/2024       Last LFTs:  Lab Results   Component Value Date    ALT 42 05/04/2024    AST 51 (H) 05/04/2024    ALKPHOS 77  05/04/2024    BILITOT 0.7 05/04/2024       Last coags:  Lab Results   Component Value Date    INR 1.2 (H) 05/04/2024    APTT 46 (H) 08/13/2022       Micro/culture data:  No results found for the last 90 days.         Imaging:   XR chest 1 view   Final Result   1.  No evidence of acute cardiopulmonary process.        I personally reviewed the images/study and I agree with Trini May DO's (radiology resident) findings as stated. This study   was interpreted at [place], [place].        MACRO:   None        Signed by: Karon Munoz 5/4/2024 8:30 PM   Dictation workstation:   [id number]          Interventions:   Medications   glucagon (Glucagen) injection 1 mg (has no administration in time range)   dextrose 50 % injection 25 g (has no administration in time range)   glucagon (Glucagen) injection 1 mg (has no administration in time range)   dextrose 50 % injection 12.5 g (has no administration in time range)   insulin glargine (Lantus) injection 15 Units (15 Units subcutaneous Given 5/4/24 2118)   lactated Ringer's bolus 1,000 mL (0 mL intravenous Stopped 5/4/24 2213)   insulin lispro (HumaLOG) injection 8 Units (8 Units subcutaneous Given 5/4/24 2117)   lactated Ringer's bolus 1,000 mL (0 mL intravenous Stopped 5/5/24 0009)         ROS:  10 pt ROS reviewed and negative except as per HPI.     PSHx:   Past Surgical History:   Procedure Laterality Date    OTHER SURGICAL HISTORY  09/15/2022    Kidney transplantation    OTHER SURGICAL HISTORY  06/05/2020    Colonoscopy    OTHER SURGICAL HISTORY  06/05/2020    Arteriovenous fistula creation procedure    OTHER SURGICAL HISTORY  10/17/2019    Biopsy Renal    has a past surgical history that includes Other surgical history (09/15/2022); Other surgical history (06/05/2020); Other surgical history (06/05/2020); and Other surgical history (10/17/2019).  Meds:   Current Outpatient Medications   Medication  "Instructions    acetaminophen (Tylenol) 325 mg tablet oral, Every 6 hours PRN    allopurinol (Zyloprim) 100 mg tablet oral    amLODIPine (NORVASC) 10 mg, oral, Daily    ammonium lactate (Lac-Hydrin) 12 % lotion Topical, As needed    aspirin 325 mg, oral, Daily    blood-glucose sensor (DEXCOM G6 SENSOR MISC) miscellaneous, Change sensor every 10 days as directed    blood-glucose transmitter (DEXCOM G6 TRANSMITTER MISC) miscellaneous, Every 3 months, Replace transmitter    carvedilol (COREG) 50 mg, oral, 2 times daily    cholecalciferol (Vitamin D-3) 50 mcg (2,000 unit) capsule 1 capsule, oral, Daily    clindamycin (Cleocin T) 1 % lotion 1 Application, Topical, 2 times daily, To face    dapagliflozin propanediol (FARXIGA) 10 mg, oral, Daily    Dexcom G7 Sensor device Change sensor every 10 days    ferric citrate 210 mg iron tablet oral    fluticasone (Flonase) 50 mcg/actuation nasal spray nasal, Every 12 hours    hydrALAZINE (APRESOLINE) 100 mg, oral, 3 times daily    insulin glargine (Lantus) 100 unit/mL (3 mL) pen 15 units daily    insulin lispro (HUMALOG) 8 Units, subcutaneous, 3 times daily with meals, with meals plus sliding scale, expect up to 40 units daily    ketoconazole (NIZOral) 2 % shampoo Topical    lancets misc miscellaneous, Use as directed    lovastatin (MEVACOR) 40 mg, oral, Nightly    magnesium oxide (MAG-OX) 400 mg, oral, Daily    mycophenolate (CELLCEPT) 750 mg, oral, 2 times daily    OneTouch Delica Plus Lancet 33 gauge misc Use as directed. Please dispense any brand covered by insurance and compatible with her device    pen needle, diabetic 32 gauge x 3/16\" needle miscellaneous, 4 times daily, Inject insulin    pen needle, diabetic 32 gauge x 3/16\" needle USE AS DIRECTED TO INJECT INSULIN FOUR TIMES DAILY    predniSONE (Deltasone) 5 mg tablet TAKE ONE (1) TABLET BY MOUTH ONCE DAILY.    semaglutide 2 mg/dose (8 mg/3 mL) pen injector INJECT 2MG UNDER THE SKIN ONCE WEEKLY AS DIRECTED.    tacrolimus " (PROGRAF) 1 mg, oral, Every 12 hours    tacrolimus (PROGRAF) 0.5 mg, oral, 2 times daily    telmisartan (MIcarDIS) 80 mg tablet oral    valGANciclovir (VALCYTE) 900 mg, oral, 2 times daily      PMHx:   Past Medical History:   Diagnosis Date    Asymptomatic menopausal state 06/30/2021    Post-menopausal    Chronic kidney disease, stage 3 unspecified (Multi) 09/16/2022    CKD (chronic kidney disease), stage III    Chronic kidney disease, stage 4 (severe) (Multi) 10/17/2019    CKD (chronic kidney disease), stage IV    Combined forms of age-related cataract, left eye 07/08/2020    Combined form of age-related cataract, left eye    Combined forms of age-related cataract, right eye 07/08/2020    Combined form of age-related cataract, right eye    Encounter for gynecological examination (general) (routine) without abnormal findings 11/08/2021    Well female exam with routine gynecological exam    Encounter for immunization 02/09/2017    Need for diphtheria-tetanus-pertussis (Tdap) vaccine    Encounter for immunization 02/09/2017    Need for shingles vaccine    Encounter for other preprocedural examination 09/28/2021    Pre-transplant evaluation for kidney transplant    Encounter for screening for malignant neoplasm of cervix 05/21/2018    Screening for cervical cancer    Encounter for screening for malignant neoplasm of colon 11/21/2019    Screening for colon cancer    Encounter for screening for respiratory tuberculosis 01/21/2021    Screening for tuberculosis    Impacted cerumen, right ear 02/11/2016    Impacted cerumen of right ear    Need for assistance at home and no other household member able to render care     Need for home health care    Personal history of other diseases of the circulatory system     History of hypertension    Personal history of other diseases of the female genital tract 05/21/2018    History of vaginal discharge    Personal history of other diseases of the nervous system and sense organs  01/28/2020    History of viral conjunctivitis    Personal history of other diseases of the nervous system and sense organs 02/11/2016    History of impacted cerumen    Personal history of other diseases of the nervous system and sense organs 02/11/2016    History of impacted cerumen    Personal history of other diseases of the respiratory system 01/28/2020    History of paranasal sinus congestion    Personal history of other drug therapy 11/09/2017    History of influenza vaccination    Personal history of other endocrine, nutritional and metabolic disease 08/19/2022    History of diabetes mellitus    Personal history of other endocrine, nutritional and metabolic disease 09/09/2022    History of hyperlipidemia    Personal history of other endocrine, nutritional and metabolic disease 06/30/2021    History of hyperlipidemia    Personal history of other medical treatment 06/29/2021    History of screening mammography    Personal history of other medical treatment 09/12/2019    History of screening mammography    Type 1 diabetes mellitus with hypoglycemia with coma (Multi) 05/27/2022    Type 1 diabetes mellitus with hypoglycemic coma    Type 2 diabetes mellitus without complications (Multi) 01/16/2018    Diabetes mellitus type 2 without retinopathy     Past Medical History:   Diagnosis Date    Asymptomatic menopausal state 06/30/2021    Post-menopausal    Chronic kidney disease, stage 3 unspecified (Multi) 09/16/2022    CKD (chronic kidney disease), stage III    Chronic kidney disease, stage 4 (severe) (Multi) 10/17/2019    CKD (chronic kidney disease), stage IV    Combined forms of age-related cataract, left eye 07/08/2020    Combined form of age-related cataract, left eye    Combined forms of age-related cataract, right eye 07/08/2020    Combined form of age-related cataract, right eye    Encounter for gynecological examination (general) (routine) without abnormal findings 11/08/2021    Well female exam with routine  gynecological exam    Encounter for immunization 02/09/2017    Need for diphtheria-tetanus-pertussis (Tdap) vaccine    Encounter for immunization 02/09/2017    Need for shingles vaccine    Encounter for other preprocedural examination 09/28/2021    Pre-transplant evaluation for kidney transplant    Encounter for screening for malignant neoplasm of cervix 05/21/2018    Screening for cervical cancer    Encounter for screening for malignant neoplasm of colon 11/21/2019    Screening for colon cancer    Encounter for screening for respiratory tuberculosis 01/21/2021    Screening for tuberculosis    Impacted cerumen, right ear 02/11/2016    Impacted cerumen of right ear    Need for assistance at home and no other household member able to render care     Need for home health care    Personal history of other diseases of the circulatory system     History of hypertension    Personal history of other diseases of the female genital tract 05/21/2018    History of vaginal discharge    Personal history of other diseases of the nervous system and sense organs 01/28/2020    History of viral conjunctivitis    Personal history of other diseases of the nervous system and sense organs 02/11/2016    History of impacted cerumen    Personal history of other diseases of the nervous system and sense organs 02/11/2016    History of impacted cerumen    Personal history of other diseases of the respiratory system 01/28/2020    History of paranasal sinus congestion    Personal history of other drug therapy 11/09/2017    History of influenza vaccination    Personal history of other endocrine, nutritional and metabolic disease 08/19/2022    History of diabetes mellitus    Personal history of other endocrine, nutritional and metabolic disease 09/09/2022    History of hyperlipidemia    Personal history of other endocrine, nutritional and metabolic disease 06/30/2021    History of hyperlipidemia    Personal history of other medical treatment  06/29/2021    History of screening mammography    Personal history of other medical treatment 09/12/2019    History of screening mammography    Type 1 diabetes mellitus with hypoglycemia with coma (Multi) 05/27/2022    Type 1 diabetes mellitus with hypoglycemic coma    Type 2 diabetes mellitus without complications (Multi) 01/16/2018    Diabetes mellitus type 2 without retinopathy     Allergies: Adhesive tape-silicones and Amoxicillin  Social History:  Social History     Tobacco Use    Smoking status: Never    Smokeless tobacco: Never   Substance Use Topics    Alcohol use: Never    Drug use: Never     Social History     Socioeconomic History    Marital status: Single     Spouse name: Not on file    Number of children: Not on file    Years of education: Not on file    Highest education level: Not on file   Occupational History    Not on file   Tobacco Use    Smoking status: Never    Smokeless tobacco: Never   Substance and Sexual Activity    Alcohol use: Never    Drug use: Never    Sexual activity: Not on file   Other Topics Concern    Not on file   Social History Narrative    Not on file     Social Determinants of Health     Financial Resource Strain: Not on file   Food Insecurity: Not on file   Transportation Needs: Not on file   Physical Activity: Not on file   Stress: Not on file   Social Connections: Not on file   Intimate Partner Violence: Not on file   Housing Stability: Not on file             Physical Exam  Constitutional:       Appearance: She is ill-appearing.   HENT:      Head: Atraumatic.      Mouth/Throat:      Mouth: Mucous membranes are dry.      Pharynx: Oropharynx is clear.   Eyes:      Extraocular Movements: Extraocular movements intact.      Pupils: Pupils are equal, round, and reactive to light.   Cardiovascular:      Rate and Rhythm: Regular rhythm. Tachycardia present.      Heart sounds: Murmur (loud systolic murmur heard best at L parasternal border, 2nd IC space) heard.   Pulmonary:       Effort: Pulmonary effort is normal.      Breath sounds: Normal breath sounds. No wheezing, rhonchi or rales.   Abdominal:      General: Abdomen is flat. Bowel sounds are normal. There is no distension.      Palpations: Abdomen is soft.      Tenderness: There is no abdominal tenderness. There is no rebound.   Skin:     General: Skin is warm and dry.   Neurological:      General: No focal deficit present.      Mental Status: She is oriented to person, place, and time.      Comments: Oriented, but otherwise confused. Notable tremors in extremities.         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 Labs on presentation notable for hyperglycemia with borderline anion gap c/f DKA, as well as hyponatremia (although Na corrected for BG is 134) and acute hypoxic respiratory failure, now on 2L NC. Likely mild DKA, now resolving, although trigger is unclear. UA is notable for leukocyte esterase and WBCs, although in the absence of nitrites and symptoms of UTI I will elect not to treat for UTI. Diarrhea is somewhat concerning for CMV enteritis in the setting of history of CMV viremia; will order stool pathogen panel, CMV PCR, however since patient is otherwise fairly well appearing it seems unlikely that she truly has tissue-invasive CMV.     #DKA - resolved   #hyperglycemia   #NIDDM2  ::home meds: farxiga 10 mg, ozempic 2 mg, lantus 15, lispro sliding scale   -s/p 15u lantus + 8 lispro in ED  -carb controlled diet  -c/w mild ISS and qACHS glucose checks  -recheck A1c  -c/w 15u lantus daily + ISS   -hold home OHAs for now      #ESRD s/p DDRT 8/2022    #CMV viremia   ::per transplant nephrology notes, patient should be on 500 mg MMF BID, tacrolimus 1.5 mg BID, and 5 mg prednisone  -resume prednisone 5 daily and tacrolimus 1.5 BID  -tacro level 1 hr before usual dose in AM; per last transplant note 11/23 goal level is 5-8  -hold MMF per transplant nephro  -transplant nephrology contacted in ED, formal consult in AM  -previously on vemlidy for positive HepB core ab, treatment completed   -check CMV PCR  -clarify aspirin 325 rx with transplant; prior rx appears to have come from transplant, unclear if patient should be on it     #systolic ejection murmur   ::Cardiology notes from 2021 document grade II ESM; audible ESM heard on exam on admission so murmur is not new   -repeat TTE ordered; last TTE in 2022 suggested possible LVOT obstruction    #acute hypoxic respiratory failure  -desatted to 88% while sleeping in ED, currently on 2L   -CXR no acute cardiopulmonary process   -WOAT     #hypertonic hyponatremia  ::Na 127 on admission, 134 corrected for BG, serum osm 310   -s/p 2L IVF in ED   -additional 1L LR now   -recheck RFP    #diarrhea   -check stool pathogen PCR   -CMV PCR pending   -if transplant nephrology is concerned for CMV enteritis, may require GI consultation and biopsy     #HLD  -c/w home lovastatin    #HTN   -resume home coreg 50 BID  -held hydralazine, unclear if pt is currently taking it     F: Bolus prn; s/p 3L  E: Replete prn  N: diabetic  GI: deferred   DVT: heparin  Abx: none  Access: PIV  O2: 2L  CODE: FULL (confirmed on admission)  NOK: sibling Gabbie [phone]      Solange Flores   PGY-3 Internal Medicine

## 2024-05-05 NOTE — PROGRESS NOTES
Pharmacy Medication History Review    Jacklyn Villa is a 73 y.o. female admitted for Hyperglycemia. Pharmacy reviewed the patient's [id number] medications for accuracy.    The list below reflects the updated PTA list. Comments regarding how patient may be taking medications differently can be found in the Admit Orders Activity  Prior to Admission Medications   Prescriptions Last Dose Informant   Dexcom G7 Sensor device  Self   Sig: Change sensor every 10 days   acetaminophen (Tylenol) 325 mg tablet  Self   Sig: Take by mouth every 6 hours if needed.   ammonium lactate (Lac-Hydrin) 12 % lotion  Self   Sig: Apply topically if needed for dry skin (apply to your feet).   aspirin 325 mg EC tablet  Self   Sig: Take 1 tablet (325 mg) by mouth once daily.   carvedilol (Coreg) 25 mg tablet  Self   Sig: Take 2 tablets (50 mg) by mouth 2 times a day.   cholecalciferol (Vitamin D-3) 50 mcg (2,000 unit) capsule  Self   Sig: Take 1 capsule (50 mcg) by mouth once daily.   dapagliflozin propanediol (Farxiga) 10 mg  Self   Sig: Take 1 tablet (10 mg) by mouth once daily.   ferric citrate 210 mg iron tablet  Self   Sig: Take by mouth.   fluticasone (Flonase) 50 mcg/actuation nasal spray  Self   Sig: Administer 1 spray into affected nostril(s) once daily as needed for rhinitis or allergies.   hydrALAZINE (Apresoline) 100 mg tablet  Self   Sig: Take 1 tablet (100 mg) by mouth 3 times a day.   insulin glargine (Lantus) 100 unit/mL (3 mL) pen  Self   Sig: 15 units daily   insulin lispro (HumaLOG) 100 unit/mL injection     Sig: Inject 8 Units under the skin 3 times a day with meals. with meals plus sliding scale, expect up to 40 units daily   lovastatin (Mevacor) 40 mg tablet  Self   Sig: Take 1 tablet (40 mg) by mouth once daily at bedtime.   magnesium oxide (Mag-Ox) 400 mg (241.3 mg magnesium) tablet  Self   Sig: Take 1 tablet (400 mg) by mouth once daily.   mycophenolate (Cellcept) 250 mg capsule  Self   Sig: Take 3 capsules  "(750 mg) by mouth 2 times a day.   pen needle, diabetic 32 gauge x 3/16\" needle  Self   Sig: USE AS DIRECTED TO INJECT INSULIN FOUR TIMES DAILY   predniSONE (Deltasone) 5 mg tablet  Self   Sig: TAKE ONE (1) TABLET BY MOUTH ONCE DAILY.   semaglutide 2 mg/dose (8 mg/3 mL) pen injector  Self   Sig: INJECT 2MG UNDER THE SKIN ONCE WEEKLY AS DIRECTED.   tacrolimus (Prograf) 0.5 mg capsule  Self   Sig: Take 1 capsule (0.5 mg) by mouth 2 times a day.   tacrolimus (Prograf) 1 mg capsule  Self   Sig: Take 1 capsule (1 mg) by mouth every 12 hours.   valGANciclovir (Valcyte) 450 mg tablet  Self   Sig: Take 2 tablets (900 mg) by mouth 2 times a day.      Facility-Administered Medications: None          Patient accepts M2B at discharge. Pharmacy has been updated to [facility].    Sources:    -patient interview (was unreliable to review medications with- was confused/drowsy/speaking very slowly.  Stated she managed her own medications at home)  -called Son number (who patient lives with)- rang once then stopped (no voicemail)  -spoke with patient sister, Gabbie.  She does not know about patient's home meds and said son does not either and states that patient currently and has always been the one to manage her home meds  -outpatient pharmacy dispense history with [facility] (all listed medications are up to date and filled recently- with exception of Valcyte and insulin lispro)  -Care Everywhere medication lists  -OARRS  -transplant office visit note from 11/21/23  -PCP office visit note/med list from 4/22/24    Below are additional concerns with the patient's PTA list:    -outpatient pharmacy fill history is recent and matches PCP office visit note/med list.      -only listed medications not filled recently are:     Valcyte 450mg tablet, 2 tablets twice a day (last filled 7/1/23 x 30 day supply, 2 refills)- unable to confirm if patient is to still be on this med, was not listed in transplant office visit note from " 11/21/23    Humalog (insulin lispro)- last filled 10/16/23    Sarmad Rachel, Natanael  Transitions of Care Pharmacist  [facility]  Please reach out via Secure Chat for questions, or if no response call [facility]Safe or vocera [facility]

## 2024-05-05 NOTE — ED PROVIDER NOTES
HPI   Chief Complaint   Patient presents with    Hyperglycemia    Weakness, Gen       HPI  Patient is 73-year-old female with past medical history of ESRD (s/p kidney transplant 8/2022), CMV, DM type II, HTN who presents emergency department with hyperglycemia and generalized weakness.  Patient is accompanied by her son who helps with the HPI.  He states that the patient moved in with him last weekend and he has noticed that she has been more tired and weak than normal and has not been wanting to eat.  She states that she has been thirsty and has been drinking a lot but states that she does not drink water with most of her hydration, in the form of pop.  Patient states she has a Dexcom, but it has not been working and she has not been taking any of her medications.  She states that she cannot remember the last time she took her medications.  She states that she has felt weak and tired but denies nausea, vomiting, headache, fevers, vision changes, changes in strength or sensation, changes in urination or blood in her urine or stool.  She does state that she has had diarrhea over the past week.  After chart review, patient confirmed that she is supposed be taking 10 units of Lantus daily with 8 units of lispro with meals.                  [place] Coma Scale Score: 15                     Patient History   Past Medical History:   Diagnosis Date    Asymptomatic menopausal state 06/30/2021    Post-menopausal    Chronic kidney disease, stage 3 unspecified (Multi) 09/16/2022    CKD (chronic kidney disease), stage III    Chronic kidney disease, stage 4 (severe) (Multi) 10/17/2019    CKD (chronic kidney disease), stage IV    Combined forms of age-related cataract, left eye 07/08/2020    Combined form of age-related cataract, left eye    Combined forms of age-related cataract, right eye 07/08/2020    Combined form of age-related cataract, right eye    Encounter for gynecological examination (general) (routine) without abnormal  findings 11/08/2021    Well female exam with routine gynecological exam    Encounter for immunization 02/09/2017    Need for diphtheria-tetanus-pertussis (Tdap) vaccine    Encounter for immunization 02/09/2017    Need for shingles vaccine    Encounter for other preprocedural examination 09/28/2021    Pre-transplant evaluation for kidney transplant    Encounter for screening for malignant neoplasm of cervix 05/21/2018    Screening for cervical cancer    Encounter for screening for malignant neoplasm of colon 11/21/2019    Screening for colon cancer    Encounter for screening for respiratory tuberculosis 01/21/2021    Screening for tuberculosis    Impacted cerumen, right ear 02/11/2016    Impacted cerumen of right ear    Need for assistance at home and no other household member able to render care     Need for home health care    Personal history of other diseases of the circulatory system     History of hypertension    Personal history of other diseases of the female genital tract 05/21/2018    History of vaginal discharge    Personal history of other diseases of the nervous system and sense organs 01/28/2020    History of viral conjunctivitis    Personal history of other diseases of the nervous system and sense organs 02/11/2016    History of impacted cerumen    Personal history of other diseases of the nervous system and sense organs 02/11/2016    History of impacted cerumen    Personal history of other diseases of the respiratory system 01/28/2020    History of paranasal sinus congestion    Personal history of other drug therapy 11/09/2017    History of influenza vaccination    Personal history of other endocrine, nutritional and metabolic disease 08/19/2022    History of diabetes mellitus    Personal history of other endocrine, nutritional and metabolic disease 09/09/2022    History of hyperlipidemia    Personal history of other endocrine, nutritional and metabolic disease 06/30/2021    History of hyperlipidemia     Personal history of other medical treatment 06/29/2021    History of screening mammography    Personal history of other medical treatment 09/12/2019    History of screening mammography    Type 1 diabetes mellitus with hypoglycemia with coma (Multi) 05/27/2022    Type 1 diabetes mellitus with hypoglycemic coma    Type 2 diabetes mellitus without complications (Multi) 01/16/2018    Diabetes mellitus type 2 without retinopathy     Past Surgical History:   Procedure Laterality Date    OTHER SURGICAL HISTORY  09/15/2022    Kidney transplantation    OTHER SURGICAL HISTORY  06/05/2020    Colonoscopy    OTHER SURGICAL HISTORY  06/05/2020    Arteriovenous fistula creation procedure    OTHER SURGICAL HISTORY  10/17/2019    Biopsy Renal     Family History   Problem Relation Name Age of Onset    Ovarian cancer Mother      Hypertension Sister       Social History     Tobacco Use    Smoking status: Never    Smokeless tobacco: Never   Substance Use Topics    Alcohol use: Never    Drug use: Never       Physical Exam   ED Triage Vitals [05/04/24 1918]   Temperature Heart Rate Respirations BP   37 °C (98.6 °F) (!) 104 16 161/72      Pulse Ox Temp Source Heart Rate Source Patient Position   96 % Oral Monitor --      BP Location FiO2 (%)     -- --       Physical Exam  Vitals and nursing note reviewed.   Constitutional:       General: She is not in acute distress.     Appearance: She is well-developed.   HENT:      Head: Normocephalic and atraumatic.   Eyes:      Conjunctiva/sclera: Conjunctivae normal.   Cardiovascular:      Rate and Rhythm: Regular rhythm. Tachycardia present.      Pulses: Normal pulses.      Heart sounds: Murmur heard.   Pulmonary:      Effort: Pulmonary effort is normal. No respiratory distress.      Breath sounds: Normal breath sounds.   Abdominal:      General: There is no distension.      Palpations: Abdomen is soft.      Tenderness: There is no abdominal tenderness.   Musculoskeletal:         General: No  swelling.      Cervical back: Neck supple.      Right lower leg: No edema.      Left lower leg: No edema.   Skin:     General: Skin is warm and dry.      Capillary Refill: Capillary refill takes less than 2 seconds.   Neurological:      Mental Status: She is alert and oriented to person, place, and time.      GCS: GCS eye subscore is 4. GCS verbal subscore is 5. GCS motor subscore is 6.      Sensory: Sensation is intact.   Psychiatric:         Mood and Affect: Mood normal.         Speech: Speech normal.         Behavior: Behavior is cooperative.       ED Course & MDM   ED Course as of 05/05/24 0002   Sat May 04, 2024   2112 Senior Resident Attestation  Patient seen and discussed with osito resident.  I have independently evaluated the patient and reviewed all necessary laboratory and radiographic results.    73-year-old female with history of diabetes, prior kidney transplant presents to the emergency department with hyperglycemia, continuous glucose monitor broke a few days ago, she has not taken insulin since then, comes in feeling fatigued, clinically looks dehydrated.  Will give IV fluids, labs obtained from triage demonstrating hyperglycemia with borderline DKA with borderline gap.  Will give Lantus, lispro as per her home doses, will give IV fluids.  Do not feel the patient requires insulin drip at this time given her mild acidosis, and borderline anion gap elevation.  Will plan to repeat blood gas, will plan to trend glucose.  Patient also noted to have SHWETA.  Does states she takes tacrolimus for her prior kidney transplant.  Will need trending of her creatinine inpatient.  No infectious symptoms, suspect that the etiology of her hyperglycemia is medication noncompliance.    Patient seen and discussed with ED attending physician.    Grayson Rodriguez MD  PGY 3 Emergency Medicine [SH]      ED Course User Index  [SH] Thomas Rodriguez MD         Diagnoses as of 05/05/24 0002   Hyperglycemia   Noncompliance with  medication regimen       Medical Decision Making  Patient is 73-year-old female with past medical history of ESRD (s/p kidney transplant 8/2023), CMV, DM type II, HTN who presents emergency department with hyperglycemia and generalized weakness.  Patient not in DKA with a pH of 7.32 and an anion gap of 15 and bicarb of 21.6.  HHS is also unlikely with a glucose below 600 and no altered mental status.  A serum osmole's was ordered to confirm and resulted at 315.  Patient's lactate was within normal limits at 1.9.  Patient CMP showed hyponatremia at 127 and evidence of an SHWETA with a BUN of 45 and a creatinine of 1.68.  Patient ordered 1 L of lactated ringer solution for fluids along with 15 units of Lantus and 8 units of lispro.  Patient's chest x-ray showed no evidence of cardiopulmonary processes.  Independent interpretation of patient's EKG showed sinus tachycardia with a rate of 109 bpm, normal axis, , QRS 78, QTc 401, no evidence of ischemia or infarction.  Patient CBC was within normal limits.  Patient's lipase was slightly elevated at 122, patient not complaining of any abdominal pain and nontender upon palpation.  Patient desatted to 85 to 88% while sleeping and was placed on 2 L nasal cannula.  Upon recheck, patient's glucose elevated at 477.  Patient given another liter lactated ringer bolus.  Patient admitted to general medicine in stable condition under Dr. Caicedo for continued care of hyperglycemia and medication noncompliance.    Procedure  Procedures none     Shane Moody DO  Resident  05/05/24 0003

## 2024-05-06 ENCOUNTER — APPOINTMENT (OUTPATIENT)
Dept: CARDIOLOGY | Facility: HOSPITAL | Age: 74
DRG: 637 | End: 2024-05-06
Payer: COMMERCIAL

## 2024-05-06 LAB
ALBUMIN SERPL BCP-MCNC: 2.7 G/DL (ref 3.4–5)
ANION GAP SERPL CALC-SCNC: 15 MMOL/L (ref 10–20)
AORTIC VALVE PEAK VELOCITY: 1.92 M/S
ATRIAL RATE: 104 BPM
AV PEAK GRADIENT: 14.7 MMHG
AVA (PEAK VEL): 2.16 CM2
BASOPHILS # BLD AUTO: 0.01 X10*3/UL (ref 0–0.1)
BASOPHILS NFR BLD AUTO: 0.3 %
BUN SERPL-MCNC: 43 MG/DL (ref 6–23)
C COLI+JEJ+UPSA DNA STL QL NAA+PROBE: NOT DETECTED
CALCIUM SERPL-MCNC: 8.6 MG/DL (ref 8.6–10.6)
CHLORIDE SERPL-SCNC: 100 MMOL/L (ref 98–107)
CHLORIDE UR-SCNC: 23 MMOL/L
CHLORIDE/CREATININE (MMOL/G) IN URINE: 35 MMOL/G CREAT (ref 38–318)
CO2 SERPL-SCNC: 21 MMOL/L (ref 21–32)
CREAT SERPL-MCNC: 1.29 MG/DL (ref 0.5–1.05)
CREAT UR-MCNC: 66.6 MG/DL (ref 20–320)
CREAT UR-MCNC: 66.6 MG/DL (ref 20–320)
EC STX1 GENE STL QL NAA+PROBE: NOT DETECTED
EC STX2 GENE STL QL NAA+PROBE: NOT DETECTED
EGFRCR SERPLBLD CKD-EPI 2021: 44 ML/MIN/1.73M*2
EJECTION FRACTION APICAL 4 CHAMBER: 68.9
EOSINOPHIL # BLD AUTO: 0 X10*3/UL (ref 0–0.4)
EOSINOPHIL NFR BLD AUTO: 0 %
ERYTHROCYTE [DISTWIDTH] IN BLOOD BY AUTOMATED COUNT: 13.7 % (ref 11.5–14.5)
GLUCOSE BLD MANUAL STRIP-MCNC: 236 MG/DL (ref 74–99)
GLUCOSE BLD MANUAL STRIP-MCNC: 280 MG/DL (ref 74–99)
GLUCOSE BLD MANUAL STRIP-MCNC: 298 MG/DL (ref 74–99)
GLUCOSE BLD MANUAL STRIP-MCNC: 300 MG/DL (ref 74–99)
GLUCOSE SERPL-MCNC: 267 MG/DL (ref 74–99)
HCT VFR BLD AUTO: 37.1 % (ref 36–46)
HGB BLD-MCNC: 12.8 G/DL (ref 12–16)
HOLD SPECIMEN: NORMAL
IMM GRANULOCYTES # BLD AUTO: 0.09 X10*3/UL (ref 0–0.5)
IMM GRANULOCYTES NFR BLD AUTO: 2.3 % (ref 0–0.9)
INTERPRETATION FOR ANTI-PLATELET FACTOR 4 ANTIBODY: NEGATIVE
LEFT VENTRICLE INTERNAL DIMENSION DIASTOLE: 4.5 CM (ref 3.5–6)
LEFT VENTRICULAR OUTFLOW TRACT DIAMETER: 2 CM
LV EJECTION FRACTION BIPLANE: 65 %
LYMPHOCYTES # BLD AUTO: 0.18 X10*3/UL (ref 0.8–3)
LYMPHOCYTES NFR BLD AUTO: 4.6 %
MAGNESIUM SERPL-MCNC: 1.76 MG/DL (ref 1.6–2.4)
MCH RBC QN AUTO: 27.8 PG (ref 26–34)
MCHC RBC AUTO-ENTMCNC: 34.5 G/DL (ref 32–36)
MCV RBC AUTO: 81 FL (ref 80–100)
MITRAL VALVE E/A RATIO: 0.73
MITRAL VALVE E/E' RATIO: 19.67
MONOCYTES # BLD AUTO: 0.65 X10*3/UL (ref 0.05–0.8)
MONOCYTES NFR BLD AUTO: 16.7 %
NEUTROPHILS # BLD AUTO: 2.96 X10*3/UL (ref 1.6–5.5)
NEUTROPHILS NFR BLD AUTO: 76.1 %
NOROVIRUS GI + GII RNA STL NAA+PROBE: NOT DETECTED
NRBC BLD-RTO: 0 /100 WBCS (ref 0–0)
P AXIS: 55 DEGREES
P OFFSET: 207 MS
P ONSET: 133 MS
PHOSPHATE SERPL-MCNC: 4 MG/DL (ref 2.5–4.9)
PLATELET # BLD AUTO: 91 X10*3/UL (ref 150–450)
POTASSIUM SERPL-SCNC: 3.8 MMOL/L (ref 3.5–5.3)
POTASSIUM UR-SCNC: 20 MMOL/L
POTASSIUM/CREAT UR-RTO: 30 MMOL/G CREAT
PR INTERVAL: 164 MS
PROT UR-ACNC: 105 MG/DL (ref 5–24)
PROT/CREAT UR: 1.58 MG/MG CREAT (ref 0–0.17)
Q ONSET: 215 MS
QRS COUNT: 17 BEATS
QRS DURATION: 86 MS
QT INTERVAL: 330 MS
QTC CALCULATION(BAZETT): 433 MS
QTC FREDERICIA: 396 MS
R AXIS: 7 DEGREES
RBC # BLD AUTO: 4.6 X10*6/UL (ref 4–5.2)
RIGHT VENTRICLE FREE WALL PEAK S': 13.7 CM/S
RV RNA STL NAA+PROBE: NOT DETECTED
SALMONELLA DNA STL QL NAA+PROBE: NOT DETECTED
SERUM AND PLATELET FACTOR 4: 0.07 OD UNITS
SHIGELLA DNA SPEC QL NAA+PROBE: NOT DETECTED
SODIUM SERPL-SCNC: 132 MMOL/L (ref 136–145)
SODIUM UR-SCNC: 24 MMOL/L
SODIUM/CREAT UR-RTO: 36 MMOL/G CREAT
T AXIS: 52 DEGREES
T OFFSET: 380 MS
TACROLIMUS BLD-MCNC: 17.6 NG/ML
TRICUSPID ANNULAR PLANE SYSTOLIC EXCURSION: 2.3 CM
V CHOLERAE DNA STL QL NAA+PROBE: NOT DETECTED
VENTRICULAR RATE: 104 BPM
WBC # BLD AUTO: 3.9 X10*3/UL (ref 4.4–11.3)
Y ENTEROCOL DNA STL QL NAA+PROBE: NOT DETECTED

## 2024-05-06 PROCEDURE — 2500000001 HC RX 250 WO HCPCS SELF ADMINISTERED DRUGS (ALT 637 FOR MEDICARE OP): Performed by: STUDENT IN AN ORGANIZED HEALTH CARE EDUCATION/TRAINING PROGRAM

## 2024-05-06 PROCEDURE — 83735 ASSAY OF MAGNESIUM: CPT

## 2024-05-06 PROCEDURE — 2500000002 HC RX 250 W HCPCS SELF ADMINISTERED DRUGS (ALT 637 FOR MEDICARE OP, ALT 636 FOR OP/ED): Mod: MUE | Performed by: STUDENT IN AN ORGANIZED HEALTH CARE EDUCATION/TRAINING PROGRAM

## 2024-05-06 PROCEDURE — 2500000002 HC RX 250 W HCPCS SELF ADMINISTERED DRUGS (ALT 637 FOR MEDICARE OP, ALT 636 FOR OP/ED): Performed by: STUDENT IN AN ORGANIZED HEALTH CARE EDUCATION/TRAINING PROGRAM

## 2024-05-06 PROCEDURE — 2500000004 HC RX 250 GENERAL PHARMACY W/ HCPCS (ALT 636 FOR OP/ED): Performed by: STUDENT IN AN ORGANIZED HEALTH CARE EDUCATION/TRAINING PROGRAM

## 2024-05-06 PROCEDURE — 82436 ASSAY OF URINE CHLORIDE: CPT

## 2024-05-06 PROCEDURE — 80197 ASSAY OF TACROLIMUS: CPT

## 2024-05-06 PROCEDURE — 82947 ASSAY GLUCOSE BLOOD QUANT: CPT | Mod: 91

## 2024-05-06 PROCEDURE — 80069 RENAL FUNCTION PANEL: CPT

## 2024-05-06 PROCEDURE — 99233 SBSQ HOSP IP/OBS HIGH 50: CPT

## 2024-05-06 PROCEDURE — 93306 TTE W/DOPPLER COMPLETE: CPT

## 2024-05-06 PROCEDURE — 93306 TTE W/DOPPLER COMPLETE: CPT | Performed by: INTERNAL MEDICINE

## 2024-05-06 PROCEDURE — 36415 COLL VENOUS BLD VENIPUNCTURE: CPT

## 2024-05-06 PROCEDURE — 2500000004 HC RX 250 GENERAL PHARMACY W/ HCPCS (ALT 636 FOR OP/ED)

## 2024-05-06 PROCEDURE — 85025 COMPLETE CBC W/AUTO DIFF WBC: CPT

## 2024-05-06 PROCEDURE — 99233 SBSQ HOSP IP/OBS HIGH 50: CPT | Performed by: INTERNAL MEDICINE

## 2024-05-06 PROCEDURE — 84156 ASSAY OF PROTEIN URINE: CPT

## 2024-05-06 PROCEDURE — 1200000002 HC GENERAL ROOM WITH TELEMETRY DAILY

## 2024-05-06 PROCEDURE — 87506 IADNA-DNA/RNA PROBE TQ 6-11: CPT | Performed by: STUDENT IN AN ORGANIZED HEALTH CARE EDUCATION/TRAINING PROGRAM

## 2024-05-06 PROCEDURE — 82947 ASSAY GLUCOSE BLOOD QUANT: CPT

## 2024-05-06 RX ORDER — INSULIN GLARGINE 100 [IU]/ML
20 INJECTION, SOLUTION SUBCUTANEOUS NIGHTLY
Status: DISCONTINUED | OUTPATIENT
Start: 2024-05-06 | End: 2024-05-07

## 2024-05-06 RX ORDER — MAGNESIUM SULFATE HEPTAHYDRATE 40 MG/ML
2 INJECTION, SOLUTION INTRAVENOUS ONCE
Status: COMPLETED | OUTPATIENT
Start: 2024-05-06 | End: 2024-05-06

## 2024-05-06 RX ADMIN — PERFLUTREN 2 ML OF DILUTION: 6.52 INJECTION, SUSPENSION INTRAVENOUS at 14:20

## 2024-05-06 RX ADMIN — INSULIN LISPRO 3 UNITS: 100 INJECTION, SOLUTION INTRAVENOUS; SUBCUTANEOUS at 07:28

## 2024-05-06 RX ADMIN — INSULIN GLARGINE 20 UNITS: 100 INJECTION, SOLUTION SUBCUTANEOUS at 21:30

## 2024-05-06 RX ADMIN — TACROLIMUS 1.5 MG: 1 CAPSULE ORAL at 18:43

## 2024-05-06 RX ADMIN — TACROLIMUS 1.5 MG: 1 CAPSULE ORAL at 00:34

## 2024-05-06 RX ADMIN — CEFTRIAXONE SODIUM 1 G: 1 INJECTION, SOLUTION INTRAVENOUS at 08:28

## 2024-05-06 RX ADMIN — PREDNISONE 5 MG: 5 TABLET ORAL at 08:26

## 2024-05-06 RX ADMIN — CARVEDILOL 50 MG: 12.5 TABLET, FILM COATED ORAL at 21:21

## 2024-05-06 RX ADMIN — INSULIN LISPRO 2 UNITS: 100 INJECTION, SOLUTION INTRAVENOUS; SUBCUTANEOUS at 16:29

## 2024-05-06 RX ADMIN — MAGNESIUM SULFATE HEPTAHYDRATE 2 G: 40 INJECTION, SOLUTION INTRAVENOUS at 07:31

## 2024-05-06 RX ADMIN — HYDRALAZINE HYDROCHLORIDE 100 MG: 25 TABLET ORAL at 08:26

## 2024-05-06 RX ADMIN — ATORVASTATIN CALCIUM 10 MG: 20 TABLET, FILM COATED ORAL at 21:20

## 2024-05-06 RX ADMIN — HYDRALAZINE HYDROCHLORIDE 100 MG: 25 TABLET ORAL at 21:21

## 2024-05-06 RX ADMIN — MYCOPHENOLATE MOFETIL 250 MG: 500 TABLET, FILM COATED ORAL at 23:12

## 2024-05-06 RX ADMIN — CARVEDILOL 50 MG: 12.5 TABLET, FILM COATED ORAL at 08:26

## 2024-05-06 RX ADMIN — INSULIN LISPRO 3 UNITS: 100 INJECTION, SOLUTION INTRAVENOUS; SUBCUTANEOUS at 11:41

## 2024-05-06 SDOH — SOCIAL STABILITY: SOCIAL INSECURITY: ARE YOU OR HAVE YOU BEEN THREATENED OR ABUSED PHYSICALLY, EMOTIONALLY, OR SEXUALLY BY ANYONE?: NO

## 2024-05-06 SDOH — ECONOMIC STABILITY: HOUSING INSECURITY: IN THE LAST 12 MONTHS, HOW MANY PLACES HAVE YOU LIVED?: 0

## 2024-05-06 SDOH — SOCIAL STABILITY: SOCIAL INSECURITY: ARE THERE ANY APPARENT SIGNS OF INJURIES/BEHAVIORS THAT COULD BE RELATED TO ABUSE/NEGLECT?: NO

## 2024-05-06 SDOH — ECONOMIC STABILITY: HOUSING INSECURITY
IN THE LAST 12 MONTHS, WAS THERE A TIME WHEN YOU DID NOT HAVE A STEADY PLACE TO SLEEP OR SLEPT IN A SHELTER (INCLUDING NOW)?: NO

## 2024-05-06 SDOH — SOCIAL STABILITY: SOCIAL INSECURITY: DOES ANYONE TRY TO KEEP YOU FROM HAVING/CONTACTING OTHER FRIENDS OR DOING THINGS OUTSIDE YOUR HOME?: NO

## 2024-05-06 SDOH — SOCIAL STABILITY: SOCIAL INSECURITY: HAS ANYONE EVER THREATENED TO HURT YOUR FAMILY OR YOUR PETS?: NO

## 2024-05-06 SDOH — SOCIAL STABILITY: SOCIAL INSECURITY: DO YOU FEEL UNSAFE GOING BACK TO THE PLACE WHERE YOU ARE LIVING?: NO

## 2024-05-06 SDOH — ECONOMIC STABILITY: INCOME INSECURITY: IN THE LAST 12 MONTHS, WAS THERE A TIME WHEN YOU WERE NOT ABLE TO PAY THE MORTGAGE OR RENT ON TIME?: NO

## 2024-05-06 SDOH — SOCIAL STABILITY: SOCIAL INSECURITY: HAVE YOU HAD ANY THOUGHTS OF HARMING ANYONE ELSE?: NO

## 2024-05-06 SDOH — ECONOMIC STABILITY: TRANSPORTATION INSECURITY
IN THE PAST 12 MONTHS, HAS THE LACK OF TRANSPORTATION KEPT YOU FROM MEDICAL APPOINTMENTS OR FROM GETTING MEDICATIONS?: NO

## 2024-05-06 SDOH — SOCIAL STABILITY: SOCIAL INSECURITY: ABUSE: ADULT

## 2024-05-06 SDOH — ECONOMIC STABILITY: TRANSPORTATION INSECURITY
IN THE PAST 12 MONTHS, HAS LACK OF TRANSPORTATION KEPT YOU FROM MEETINGS, WORK, OR FROM GETTING THINGS NEEDED FOR DAILY LIVING?: NO

## 2024-05-06 SDOH — SOCIAL STABILITY: SOCIAL INSECURITY: DO YOU FEEL ANYONE HAS EXPLOITED OR TAKEN ADVANTAGE OF YOU FINANCIALLY OR OF YOUR PERSONAL PROPERTY?: NO

## 2024-05-06 SDOH — SOCIAL STABILITY: SOCIAL INSECURITY: HAVE YOU HAD THOUGHTS OF HARMING ANYONE ELSE?: NO

## 2024-05-06 SDOH — ECONOMIC STABILITY: INCOME INSECURITY: HOW HARD IS IT FOR YOU TO PAY FOR THE VERY BASICS LIKE FOOD, HOUSING, MEDICAL CARE, AND HEATING?: NOT HARD AT ALL

## 2024-05-06 ASSESSMENT — PAIN - FUNCTIONAL ASSESSMENT
PAIN_FUNCTIONAL_ASSESSMENT: 0-10
PAIN_FUNCTIONAL_ASSESSMENT: 0-10

## 2024-05-06 ASSESSMENT — LIFESTYLE VARIABLES
HOW OFTEN DO YOU HAVE 6 OR MORE DRINKS ON ONE OCCASION: NEVER
AUDIT-C TOTAL SCORE: 0
PRESCIPTION_ABUSE_PAST_12_MONTHS: NO
SKIP TO QUESTIONS 9-10: 1
AUDIT-C TOTAL SCORE: 0
HOW MANY STANDARD DRINKS CONTAINING ALCOHOL DO YOU HAVE ON A TYPICAL DAY: PATIENT DOES NOT DRINK
HOW OFTEN DO YOU HAVE A DRINK CONTAINING ALCOHOL: NEVER
SUBSTANCE_ABUSE_PAST_12_MONTHS: NO

## 2024-05-06 ASSESSMENT — COGNITIVE AND FUNCTIONAL STATUS - GENERAL
MOVING FROM LYING ON BACK TO SITTING ON SIDE OF FLAT BED WITH BEDRAILS: A LITTLE
PATIENT BASELINE BEDBOUND: NO
DRESSING REGULAR UPPER BODY CLOTHING: A LITTLE
HELP NEEDED FOR BATHING: A LITTLE
CLIMB 3 TO 5 STEPS WITH RAILING: A LITTLE
MOBILITY SCORE: 18
TURNING FROM BACK TO SIDE WHILE IN FLAT BAD: A LITTLE
PERSONAL GROOMING: A LITTLE
MOVING TO AND FROM BED TO CHAIR: A LITTLE
DAILY ACTIVITIY SCORE: 18
WALKING IN HOSPITAL ROOM: A LITTLE
TOILETING: A LITTLE
STANDING UP FROM CHAIR USING ARMS: A LITTLE
EATING MEALS: A LITTLE
DRESSING REGULAR LOWER BODY CLOTHING: A LITTLE

## 2024-05-06 ASSESSMENT — ACTIVITIES OF DAILY LIVING (ADL)
GROOMING: INDEPENDENT
FEEDING YOURSELF: INDEPENDENT
JUDGMENT_ADEQUATE_SAFELY_COMPLETE_DAILY_ACTIVITIES: YES
HEARING - LEFT EAR: FUNCTIONAL
BATHING: INDEPENDENT
WALKS IN HOME: INDEPENDENT
ADEQUATE_TO_COMPLETE_ADL: YES
HEARING - RIGHT EAR: FUNCTIONAL
TOILETING: INDEPENDENT
ASSISTIVE_DEVICE: DENTURES UPPER;DENTURES LOWER;EYEGLASSES
DRESSING YOURSELF: INDEPENDENT
LACK_OF_TRANSPORTATION: NO
PATIENT'S MEMORY ADEQUATE TO SAFELY COMPLETE DAILY ACTIVITIES?: YES

## 2024-05-06 ASSESSMENT — PAIN SCALES - GENERAL
PAINLEVEL_OUTOF10: 0 - NO PAIN
PAINLEVEL_OUTOF10: 0 - NO PAIN

## 2024-05-06 ASSESSMENT — PATIENT HEALTH QUESTIONNAIRE - PHQ9
SUM OF ALL RESPONSES TO PHQ9 QUESTIONS 1 & 2: 0
1. LITTLE INTEREST OR PLEASURE IN DOING THINGS: NOT AT ALL
2. FEELING DOWN, DEPRESSED OR HOPELESS: NOT AT ALL

## 2024-05-06 NOTE — PROGRESS NOTES
Jacklyn Villa is a 73 y.o. female on day 1 of admission presenting with Hyperglycemia.    Subjective   Yesterday, there was concern for new right sided facial droop. When examined this AM, cranial nerves were intact. PERRL, EOMI, symmetric facial expression, R sided facial droop when relaxed, but resolves with smile. Strength 5/5 in bilateral upper extremities, 4/5 in BL lower extremities, 5/5 with plantar and dorsiflexion. Mental status wise- AOX3. CTH showed no evidence of acute hemorrhage, mass lesion, or acute infarction.     Objective   Physical Exam  Constitutional:       General: She is not in acute distress.     Comments: Lethargic, sleepy, but follows instructions and responds to questions appropriately    HENT:      Head: Normocephalic and atraumatic.   Eyes:      Extraocular Movements: Extraocular movements intact.      Pupils: Pupils are equal, round, and reactive to light.   Cardiovascular:      Rate and Rhythm: Normal rate.      Heart sounds: Murmur heard.      Comments: Systolic murmur most prominent in RUSB  Pulmonary:      Effort: Pulmonary effort is normal. No respiratory distress.      Breath sounds: Normal breath sounds.      Comments: Currently on 2L NC  Abdominal:      General: Abdomen is flat. Bowel sounds are normal. There is no distension.      Palpations: Abdomen is soft.      Tenderness: There is no abdominal tenderness. There is no guarding.   Musculoskeletal:         General: No swelling or tenderness.   Skin:     General: Skin is warm and dry.   Neurological:      General: No focal deficit present.      Mental Status: She is oriented to person, place, and time.      Cranial Nerves: No cranial nerve deficit.      Comments: Cranial nerves were intact. PERRL, EOMI, symmetric facial expression, R sided facial droop when relaxed, but resolves with smile. Strength 5/5 in bilateral upper extremities, 4/5 in BL lower extremities, 5/5 with plantar and dorsiflexion. Mental status wise- AOX3. CTH  "showed no evidence of acute hemorrhage, mass lesion, or acute infarction.        Last Recorded Vitals  Blood pressure 158/79, pulse 89, temperature 37 °C (98.6 °F), temperature source Oral, resp. rate 16, height 1.651 m (5' 5\"), weight 95.3 kg (210 lb), SpO2 99%.  Intake/Output last 3 Shifts:  No intake/output data recorded.    Relevant Results  Scheduled medications  [Held by provider] aspirin, 325 mg, oral, Daily  atorvastatin, 10 mg, oral, Nightly  carvedilol, 50 mg, oral, BID  cefTRIAXone, 1 g, intravenous, q24h  cholecalciferol, 2,000 Units, oral, Daily  [Held by provider] dapagliflozin propanediol, 10 mg, oral, Daily  [Held by provider] heparin (porcine), 5,000 Units, subcutaneous, q8h  hydrALAZINE, 100 mg, oral, TID  insulin glargine, 20 Units, subcutaneous, Nightly  insulin lispro, 0-5 Units, subcutaneous, TID with meals  magnesium sulfate, 2 g, intravenous, Once  mycophenolate, 250 mg, oral, BID  polyethylene glycol, 17 g, oral, Daily  predniSONE, 5 mg, oral, Daily  tacrolimus, 1.5 mg, oral, q12h      Continuous medications  lactated Ringer's, 75 mL/hr, Last Rate: 75 mL/hr (05/05/24 1311)      PRN medications  PRN medications: dextrose, dextrose, glucagon, glucagon    Results for orders placed or performed during the hospital encounter of 05/04/24 (from the past 24 hour(s))   POCT GLUCOSE   Result Value Ref Range    POCT Glucose 265 (H) 74 - 99 mg/dL   Renal function panel   Result Value Ref Range    Glucose 284 (H) 74 - 99 mg/dL    Sodium 132 (L) 136 - 145 mmol/L    Potassium 4.7 3.5 - 5.3 mmol/L    Chloride 97 (L) 98 - 107 mmol/L    Bicarbonate 27 21 - 32 mmol/L    Anion Gap 13 10 - 20 mmol/L    Urea Nitrogen 37 (H) 6 - 23 mg/dL    Creatinine 1.20 (H) 0.50 - 1.05 mg/dL    eGFR 48 (L) >60 mL/min/1.73m*2    Calcium 10.0 8.6 - 10.6 mg/dL    Phosphorus 2.0 (L) 2.5 - 4.9 mg/dL    Albumin 3.2 (L) 3.4 - 5.0 g/dL   POCT GLUCOSE   Result Value Ref Range    POCT Glucose 234 (H) 74 - 99 mg/dL   POCT GLUCOSE   Result " Value Ref Range    POCT Glucose 230 (H) 74 - 99 mg/dL   CBC and Auto Differential   Result Value Ref Range    WBC 3.9 (L) 4.4 - 11.3 x10*3/uL    nRBC 0.0 0.0 - 0.0 /100 WBCs    RBC 4.60 4.00 - 5.20 x10*6/uL    Hemoglobin 12.8 12.0 - 16.0 g/dL    Hematocrit 37.1 36.0 - 46.0 %    MCV 81 80 - 100 fL    MCH 27.8 26.0 - 34.0 pg    MCHC 34.5 32.0 - 36.0 g/dL    RDW 13.7 11.5 - 14.5 %    Platelets 91 (L) 150 - 450 x10*3/uL    Neutrophils % 76.1 40.0 - 80.0 %    Immature Granulocytes %, Automated 2.3 (H) 0.0 - 0.9 %    Lymphocytes % 4.6 13.0 - 44.0 %    Monocytes % 16.7 2.0 - 10.0 %    Eosinophils % 0.0 0.0 - 6.0 %    Basophils % 0.3 0.0 - 2.0 %    Neutrophils Absolute 2.96 1.60 - 5.50 x10*3/uL    Immature Granulocytes Absolute, Automated 0.09 0.00 - 0.50 x10*3/uL    Lymphocytes Absolute 0.18 (L) 0.80 - 3.00 x10*3/uL    Monocytes Absolute 0.65 0.05 - 0.80 x10*3/uL    Eosinophils Absolute 0.00 0.00 - 0.40 x10*3/uL    Basophils Absolute 0.01 0.00 - 0.10 x10*3/uL   Renal Function Panel   Result Value Ref Range    Glucose 267 (H) 74 - 99 mg/dL    Sodium 132 (L) 136 - 145 mmol/L    Potassium 3.8 3.5 - 5.3 mmol/L    Chloride 100 98 - 107 mmol/L    Bicarbonate 21 21 - 32 mmol/L    Anion Gap 15 10 - 20 mmol/L    Urea Nitrogen 43 (H) 6 - 23 mg/dL    Creatinine 1.29 (H) 0.50 - 1.05 mg/dL    eGFR 44 (L) >60 mL/min/1.73m*2    Calcium 8.6 8.6 - 10.6 mg/dL    Phosphorus 4.0 2.5 - 4.9 mg/dL    Albumin 2.7 (L) 3.4 - 5.0 g/dL   Magnesium   Result Value Ref Range    Magnesium 1.76 1.60 - 2.40 mg/dL   PST Top   Result Value Ref Range    Extra Tube Hold for add-ons.    Urine electrolytes   Result Value Ref Range    Sodium, Urine Random 24 mmol/L    Sodium/Creatinine Ratio 36 Not established. mmol/g Creat    Potassium, Urine Random 20 mmol/L    Potassium/Creatinine Ratio 30 Not established mmol/g Creat    Chloride, Urine Random 23 mmol/L    Chloride/Creatinine Ratio 35 (L) 38 - 318 mmol/g creat    Creatinine, Urine Random 66.6 20.0 - 320.0  "mg/dL   Protein, Urine Random   Result Value Ref Range    Total Protein, Urine Random 105 (H) 5 - 24 mg/dL    Creatinine, Urine Random 66.6 20.0 - 320.0 mg/dL    T. 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 Labs on presentation notable for hyperglycemia with borderline anion gap c/f DKA, as well as hyponatremia (although Na corrected for BG is 134). UA is notable for leukocyte esterase and WBCs. At baseline, the pt is AOX4, drives, manages her own medications, and is independent for her ADLs. Per son, since moving in a week ago, she has been \"shaky\", had worsening diarrhea, and \"slower with responses\". Unclear if she has been taking her medications during that time. Given her baseline and current AMS, was started on CTX. Transplant nephrology is following, appreciate recs. Will send CMV PCR as well given hx of CMV viremia.     Updates 5/6:  - No concern for acute stroke at this time, R sided mouth droop resolves with smiling   - Continue with 75 ml/hr LR   - Transplant nephrology following, appreciate recs. Confirm need for valcyte  - Urine culture - enteric bacilli  - Kidney ultrasound - unremarkable, improved intrarenal arterial resistive indices     #DKA - resolved   #hyperglycemia   #NIDDM2  ::home meds: farxiga 10 mg, ozempic 2 mg, lantus 15, lispro sliding scale   ::A1c 6.8 on 5/5   :: s/p 15u lantus + 8 lispro in ED, 10u NPH   -carb controlled diet  -hold home OHAs for now  -c/w mild ISS and qACHS glucose checks  - Increased to 20u lantus daily + ISS - will adjust as necessary  - LR 7cc/hr " continuous fluids     #AMS  #UTI  :: Per collateral from family - pt baseline aox3, independent for all ADLs, drives car, manages medications  :: Currently oriented x3 but very drowsy, slow to respond, confused  :: UA 2+ bacteria, + LE, - nitrites  :: CT head non-con: no evidence of acute hemorrhage/mass lesion/acute infarction  - Ucx - enteric bacilli   - Started on CTX (5/5- *)     #ESRD s/p DDRT 8/2022   #CMV viremia   #SHWETA - improving  ::per transplant nephrology notes, patient should be on 500 mg MMF BID, tacrolimus 1.5 mg BID, and 5 mg prednisone  :: Cr on admission 1.68, baseline ~1 -> improving after fluids, likely pre-renal. Pt having diarrhea, frequent urination  :: s/p 3L LR, on 75 ml LR/hr  -clarify aspirin 325 rx with transplant; prior rx appears to have come from transplant, unclear if patient should be on it   -previously on vemlidy for positive HepB core ab, treatment completed   -transplant nephrology following  [ ] Follow up CMV PCR, DSA  [ ] Pending urine electrolytes  -Restarted on tacrolimus 1.5mg BID, prednisone 5mg, MMF 250mg BID  -Tacrolimus trough ordered  - Renal ultrasound with doppler - unremarkable, improved intrarenal arterial resistive indices      #Thrombocytopenia  :: Platelets 104 from 216 on 4/2024  :: possibly reactive given UTI  - Stopped sqheparin for DVT prophylaxis  [ ] Ordered PF4 - pending     #Systolic ejection murmur   ::Cardiology notes from 2021 document grade II ESM; audible ESM heard on exam on admission so murmur is not new   -repeat TTE ordered; last TTE in 2022 suggested possible LVOT obstruction     #hypertonic hyponatremia  ::Na 127 on admission, 134 corrected for BG, serum osm 310   -s/p 3L IVF in ED   -Currently on 75cc/hr LR      #diarrhea   -check stool pathogen PCR   -CMV PCR pending   -if transplant nephrology is concerned for CMV enteritis, may require GI consultation and biopsy      #HLD  -c/w home lovastatin 10mg     #HTN   -resume home coreg 50  BID  -Restarted on home hydralazine 100mg TID     F: s/p 3L, 75cc/hr LR  E: Replete prn  N: diabetic  GI: deferred   DVT: SCD  Abx: CTX  Access: PIV    CODE: FULL (confirmed on admission)  NOK: sibling Gabbie [phone]      Miriam Garcia MD

## 2024-05-06 NOTE — SIGNIFICANT EVENT
Notified by RN of new right-sided facial droop and mild dysarthria noted after shift change.  Patient is asleep on my arrival at bedside but arousable to voice.  She does not have any complaints and was responsive to commands however is notably lethargic and frequently falls asleep throughout examination.    Neuro Exam:  -Cranial nerves: Normal and symmetric sensation in all 3 facial regions bilaterally, normal blind and forehead wrinkle, however mild R sided facial droop noted on exam. Mildly decreased tone in R nasolabial folds, however this improves with smiling. The remainder of cranial nerve exam is unremarkable.   -Normal sensation to touch in BL upper and lower extremities  -4/5 strength in BL upper and lower extremities    Given normal CT head at 2 pm today and no significant changes on exam other than mild facial droop, will elect to monitor for now and intervene if any worsening of symptoms. Nurse will conduct a bedside swallow exam.

## 2024-05-06 NOTE — PROGRESS NOTES
"Jacklyn Villa is a 73 y.o. female on day 1 of admission presenting with Hyperglycemia.      Subjective   5/6: Pt seen in ED. She appears a bit more alert and interactive today, though still poor historian. She feels weak. She admits to not taking immunosuppressant meds for \"many\" days.       Objective          Vitals 24HR  Heart Rate:  []   Respirations:  [16-25]   BP: (141-168)/(67-86)   Pulse Ox:  [97 %-99 %]     Intake/Output last 3 Shifts:  No intake or output data in the 24 hours ending 05/06/24 1453    Physical Exam  General appearance: AAOx1-2. Looks weak, a little confused  Eyes: non-icteric  Skin: no apparent rash  Heart: rhythm regular.  Lungs: CTA bilat.  np wheezing/crackles, on 2L nc  Abdomen: soft, nt/nd  Extremities: no edema bilat  : no Singh  Neuro: No FND, lethargic, slightly confused  Dialysis ACCESS: no      Relevant Results    Current Facility-Administered Medications:     [Held by provider] aspirin EC tablet 325 mg, 325 mg, oral, Daily, Solange Flores MD    atorvastatin (Lipitor) tablet 10 mg, 10 mg, oral, Nightly, Solange Flores MD, 10 mg at 05/05/24 2145    carvedilol (Coreg) tablet 50 mg, 50 mg, oral, BID, Solange Flores MD, 50 mg at 05/06/24 0826    cefTRIAXone (Rocephin) IVPB 1 g, 1 g, intravenous, q24h, Inocencio Morris MD, Stopped at 05/06/24 0858    cholecalciferol (Vitamin D-3) tablet 2,000 Units, 2,000 Units, oral, Daily, Solange Flores MD, 2,000 Units at 05/05/24 0905    [Held by provider] dapagliflozin propanediol (Farxiga) tablet 10 mg, 10 mg, oral, Daily, Solange Flores MD    dextrose 50 % injection 12.5 g, 12.5 g, intravenous, q15 min PRN, Solange Flores MD    dextrose 50 % injection 25 g, 25 g, intravenous, q15 min PRN, Solange Flores MD    glucagon (Glucagen) injection 1 mg, 1 mg, intramuscular, q15 min PRN, Solange Flores MD    glucagon (Glucagen) injection 1 mg, 1 mg, intramuscular, q15 min PRN, Solange Flores MD    [Held by provider] heparin (porcine) " injection 5,000 Units, 5,000 Units, subcutaneous, q8h, Solange Flores MD, 5,000 Units at 05/05/24 0423    hydrALAZINE (Apresoline) tablet 100 mg, 100 mg, oral, TID, Inocencio Morris MD, 100 mg at 05/06/24 0826    insulin glargine (Lantus) injection 20 Units, 20 Units, subcutaneous, Nightly, Inocencio Morris MD    insulin lispro (HumaLOG) injection 0-5 Units, 0-5 Units, subcutaneous, TID with meals, Solange Flores MD, 3 Units at 05/06/24 1141    lactated Ringer's infusion, 75 mL/hr, intravenous, Continuous, Miriam Garcia MD, Last Rate: 75 mL/hr at 05/05/24 1311, 75 mL/hr at 05/05/24 1311    mycophenolate (Cellcept) tablet 250 mg, 250 mg, oral, BID, Inocencio Morris MD    polyethylene glycol (Glycolax, Miralax) packet 17 g, 17 g, oral, Daily, Solange Flores MD, 17 g at 05/05/24 0905    predniSONE (Deltasone) tablet 5 mg, 5 mg, oral, Daily, Solange Flores MD, 5 mg at 05/06/24 0826    tacrolimus (Prograf) capsule 1.5 mg, 1.5 mg, oral, q12h JENNIFER, Miriam Garcia MD    Current Outpatient Medications:     acetaminophen (Tylenol) 325 mg tablet, Take by mouth every 6 hours if needed., Disp: , Rfl:     ammonium lactate (Lac-Hydrin) 12 % lotion, Apply topically if needed for dry skin (apply to your feet)., Disp: 396 g, Rfl: 3    aspirin 325 mg EC tablet, Take 1 tablet (325 mg) by mouth once daily., Disp: 90 tablet, Rfl: 3    carvedilol (Coreg) 25 mg tablet, Take 2 tablets (50 mg) by mouth 2 times a day., Disp: 120 tablet, Rfl: 11    cholecalciferol (Vitamin D-3) 50 mcg (2,000 unit) capsule, Take 1 capsule (50 mcg) by mouth once daily., Disp: , Rfl:     dapagliflozin propanediol (Farxiga) 10 mg, Take 1 tablet (10 mg) by mouth once daily., Disp: 90 tablet, Rfl: 1    Dexcom G7 Sensor device, Change sensor every 10 days, Disp: 3 each, Rfl: 11    ferric citrate 210 mg iron tablet, Take by mouth., Disp: , Rfl:     fluticasone (Flonase) 50 mcg/actuation nasal spray, Administer 1 spray into affected nostril(s) once daily as needed for rhinitis  "or allergies., Disp: , Rfl:     hydrALAZINE (Apresoline) 100 mg tablet, Take 1 tablet (100 mg) by mouth 3 times a day., Disp: 270 tablet, Rfl: 3    insulin glargine (Lantus) 100 unit/mL (3 mL) pen, 15 units daily, Disp: 15 mL, Rfl: 5    insulin lispro (HumaLOG) 100 unit/mL injection, Inject 8 Units under the skin 3 times a day with meals. with meals plus sliding scale, expect up to 40 units daily, Disp: 30 mL, Rfl: 0    lovastatin (Mevacor) 40 mg tablet, Take 1 tablet (40 mg) by mouth once daily at bedtime., Disp: 90 tablet, Rfl: 3    magnesium oxide (Mag-Ox) 400 mg (241.3 mg magnesium) tablet, Take 1 tablet (400 mg) by mouth once daily., Disp: 90 tablet, Rfl: 3    mycophenolate (Cellcept) 250 mg capsule, Take 3 capsules (750 mg) by mouth 2 times a day., Disp: 540 capsule, Rfl: 3    pen needle, diabetic 32 gauge x 3/16\" needle, USE AS DIRECTED TO INJECT INSULIN FOUR TIMES DAILY, Disp: 400 each, Rfl: 3    predniSONE (Deltasone) 5 mg tablet, TAKE ONE (1) TABLET BY MOUTH ONCE DAILY., Disp: 90 tablet, Rfl: 3    semaglutide 2 mg/dose (8 mg/3 mL) pen injector, INJECT 2MG UNDER THE SKIN ONCE WEEKLY AS DIRECTED., Disp: 3 mL, Rfl: 5    tacrolimus (Prograf) 0.5 mg capsule, Take 1 capsule (0.5 mg) by mouth 2 times a day., Disp: 180 capsule, Rfl: 3    tacrolimus (Prograf) 1 mg capsule, Take 1 capsule (1 mg) by mouth every 12 hours., Disp: 180 capsule, Rfl: 3    valGANciclovir (Valcyte) 450 mg tablet, Take 2 tablets (900 mg) by mouth 2 times a day., Disp: , Rfl:     Results for orders placed or performed during the hospital encounter of 05/04/24 (from the past 24 hour(s))   POCT GLUCOSE   Result Value Ref Range    POCT Glucose 234 (H) 74 - 99 mg/dL   POCT GLUCOSE   Result Value Ref Range    POCT Glucose 230 (H) 74 - 99 mg/dL   CBC and Auto Differential   Result Value Ref Range    WBC 3.9 (L) 4.4 - 11.3 x10*3/uL    nRBC 0.0 0.0 - 0.0 /100 WBCs    RBC 4.60 4.00 - 5.20 x10*6/uL    Hemoglobin 12.8 12.0 - 16.0 g/dL    Hematocrit 37.1 " 36.0 - 46.0 %    MCV 81 80 - 100 fL    MCH 27.8 26.0 - 34.0 pg    MCHC 34.5 32.0 - 36.0 g/dL    RDW 13.7 11.5 - 14.5 %    Platelets 91 (L) 150 - 450 x10*3/uL    Neutrophils % 76.1 40.0 - 80.0 %    Immature Granulocytes %, Automated 2.3 (H) 0.0 - 0.9 %    Lymphocytes % 4.6 13.0 - 44.0 %    Monocytes % 16.7 2.0 - 10.0 %    Eosinophils % 0.0 0.0 - 6.0 %    Basophils % 0.3 0.0 - 2.0 %    Neutrophils Absolute 2.96 1.60 - 5.50 x10*3/uL    Immature Granulocytes Absolute, Automated 0.09 0.00 - 0.50 x10*3/uL    Lymphocytes Absolute 0.18 (L) 0.80 - 3.00 x10*3/uL    Monocytes Absolute 0.65 0.05 - 0.80 x10*3/uL    Eosinophils Absolute 0.00 0.00 - 0.40 x10*3/uL    Basophils Absolute 0.01 0.00 - 0.10 x10*3/uL   Renal Function Panel   Result Value Ref Range    Glucose 267 (H) 74 - 99 mg/dL    Sodium 132 (L) 136 - 145 mmol/L    Potassium 3.8 3.5 - 5.3 mmol/L    Chloride 100 98 - 107 mmol/L    Bicarbonate 21 21 - 32 mmol/L    Anion Gap 15 10 - 20 mmol/L    Urea Nitrogen 43 (H) 6 - 23 mg/dL    Creatinine 1.29 (H) 0.50 - 1.05 mg/dL    eGFR 44 (L) >60 mL/min/1.73m*2    Calcium 8.6 8.6 - 10.6 mg/dL    Phosphorus 4.0 2.5 - 4.9 mg/dL    Albumin 2.7 (L) 3.4 - 5.0 g/dL   Magnesium   Result Value Ref Range    Magnesium 1.76 1.60 - 2.40 mg/dL   Tacrolimus level   Result Value Ref Range    Tacrolimus  17.6 (H) <=15.0 ng/mL   PST Top   Result Value Ref Range    Extra Tube Hold for add-ons.    Urine electrolytes   Result Value Ref Range    Sodium, Urine Random 24 mmol/L    Sodium/Creatinine Ratio 36 Not established. mmol/g Creat    Potassium, Urine Random 20 mmol/L    Potassium/Creatinine Ratio 30 Not established mmol/g Creat    Chloride, Urine Random 23 mmol/L    Chloride/Creatinine Ratio 35 (L) 38 - 318 mmol/g creat    Creatinine, Urine Random 66.6 20.0 - 320.0 mg/dL   Protein, Urine Random   Result Value Ref Range    Total Protein, Urine Random 105 (H) 5 - 24 mg/dL    Creatinine, Urine Random 66.6 20.0 - 320.0 mg/dL    T. Protein/Creatinine  Ratio 1.58 (H) 0.00 - 0.17 mg/mg Creat   POCT GLUCOSE   Result Value Ref Range    POCT Glucose 298 (H) 74 - 99 mg/dL   POCT GLUCOSE   Result Value Ref Range    POCT Glucose 280 (H) 74 - 99 mg/dL   Transthoracic Echo (TTE) Complete   Result Value Ref Range    BSA 2.09 m2        Assessment and Plan  73 y.o. 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 Rivera, KDPI 78%, PRA 96%. HCV -/ R -, HBcAb D+/R HBsAb+ on Vemlidy,CMV D+/R+, EBV D+ / R-). Left donor kidney, transplanted to patient right pelvis. H/o DGF post operatively needing dialysis once due to hyperkalemia with eventual renal recovery who presentd on 5/4/2024 for lethargy, generalized weakness, loss of appetite, and diarrhea. Reports skipping all meds for several days. Labs notable for hyperglycemia, mild DKA, SHWETA.      Allograft function: s/p DDKT 8/2022  SHWETA Etiology: volume depletion, UTI  - Cr trend 1.6 - 1.2 - 1.2  - baseline Cr 0.9-1.   - Electrolytes: K 3.8, HCO3 21  - nonoliguric, proteinuric  - UA with +ve LE, mod pyuria concerning for UTI. pls get txp renal US.    - urine lytes with Na 24, Urine Cr 66  - Bps above goal. No hypervolemia on exam.   - Transplant Renal US - negative for hydronephrosis, resistive indices WNL  - UPC 1.5  - renal function improving    Immunosuppression:  - on tac 1.5mg q12h  - 5/5: Fk level was <2 (h/o skipping meds for several days)  - repeat Fk level on 5/6 was 17.6 but not accurate trough as pt received late PM dose (around midnight)  - continue tacrolimus 1.5mg BID - ensure trough drawn 30 min prior to AM level and tac should be given 12h apart (8pm and 8am)  - continue MMF at 250 mg bid (concern for infection) and pred 5mg/d.  - follow-up DSA --> HLA antibody panel in process. Last DSA 11/2022, neg.    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 Pt has been on treatment  dose Valcyte several months. Last CMV PCR neg 4/8/24. Rpt CMV pending.   On ceftriaxone.   Blood cx negative so far  - Encephalopathy: ?metabolic vs infection. Monitor    Anemia  - Hb 12.8, acceptable.     Mineral Disease of Bone  - Ca, Phos acceptable. Monitor.     D/w Dr. Micheline Sue MD  Nephrology Fellow PGY 5  Contact via secure chat

## 2024-05-07 LAB
ACANTHOCYTES BLD QL SMEAR: ABNORMAL
ALBUMIN SERPL BCP-MCNC: 2.8 G/DL (ref 3.4–5)
ANION GAP BLDV CALCULATED.4IONS-SCNC: 17 MMOL/L (ref 10–25)
ANION GAP SERPL CALC-SCNC: 15 MMOL/L (ref 10–20)
BACTERIA UR CULT: ABNORMAL
BASE EXCESS BLDV CALC-SCNC: -5 MMOL/L (ref -2–3)
BASOPHILS # BLD MANUAL: 0.04 X10*3/UL (ref 0–0.1)
BASOPHILS NFR BLD MANUAL: 0.9 %
BODY TEMPERATURE: 37 DEGREES CELSIUS
BUN SERPL-MCNC: 55 MG/DL (ref 6–23)
BURR CELLS BLD QL SMEAR: ABNORMAL
CA-I BLDV-SCNC: 1.36 MMOL/L (ref 1.1–1.33)
CALCIUM SERPL-MCNC: 9.4 MG/DL (ref 8.6–10.6)
CHLORIDE BLDV-SCNC: 93 MMOL/L (ref 98–107)
CHLORIDE SERPL-SCNC: 99 MMOL/L (ref 98–107)
CMV DNA SERPL NAA+PROBE-LOG IU: NORMAL {LOG_IU}/ML
CO2 SERPL-SCNC: 23 MMOL/L (ref 21–32)
CREAT SERPL-MCNC: 1.13 MG/DL (ref 0.5–1.05)
EGFRCR SERPLBLD CKD-EPI 2021: 52 ML/MIN/1.73M*2
EOSINOPHIL # BLD MANUAL: 0 X10*3/UL (ref 0–0.4)
EOSINOPHIL NFR BLD MANUAL: 0 %
ERYTHROCYTE [DISTWIDTH] IN BLOOD BY AUTOMATED COUNT: 13.9 % (ref 11.5–14.5)
GLUCOSE BLD MANUAL STRIP-MCNC: 275 MG/DL (ref 74–99)
GLUCOSE BLD MANUAL STRIP-MCNC: 299 MG/DL (ref 74–99)
GLUCOSE BLD MANUAL STRIP-MCNC: 314 MG/DL (ref 74–99)
GLUCOSE BLD MANUAL STRIP-MCNC: 339 MG/DL (ref 74–99)
GLUCOSE BLD MANUAL STRIP-MCNC: 363 MG/DL (ref 74–99)
GLUCOSE BLD MANUAL STRIP-MCNC: 381 MG/DL (ref 74–99)
GLUCOSE BLDV-MCNC: 485 MG/DL (ref 74–99)
GLUCOSE SERPL-MCNC: 286 MG/DL (ref 74–99)
HCO3 BLDV-SCNC: 21.6 MMOL/L (ref 22–26)
HCT VFR BLD AUTO: 34.8 % (ref 36–46)
HCT VFR BLD EST: 38 % (ref 36–46)
HGB BLD-MCNC: 12.1 G/DL (ref 12–16)
HGB BLDV-MCNC: 12.5 G/DL (ref 12–16)
IMM GRANULOCYTES # BLD AUTO: 0.03 X10*3/UL (ref 0–0.5)
IMM GRANULOCYTES NFR BLD AUTO: 0.6 % (ref 0–0.9)
INHALED O2 CONCENTRATION: 28 %
LABORATORY COMMENT REPORT: NOT DETECTED
LACTATE BLDV-SCNC: 2.3 MMOL/L (ref 0.4–2)
LYMPHOCYTES # BLD MANUAL: 0.12 X10*3/UL (ref 0.8–3)
LYMPHOCYTES NFR BLD MANUAL: 2.6 %
MAGNESIUM SERPL-MCNC: 2.49 MG/DL (ref 1.6–2.4)
MCH RBC QN AUTO: 27.9 PG (ref 26–34)
MCHC RBC AUTO-ENTMCNC: 34.8 G/DL (ref 32–36)
MCV RBC AUTO: 80 FL (ref 80–100)
MONOCYTES # BLD MANUAL: 0.34 X10*3/UL (ref 0.05–0.8)
MONOCYTES NFR BLD MANUAL: 7 %
NEUTROPHILS # BLD MANUAL: 4.29 X10*3/UL (ref 1.6–5.5)
NEUTS BAND # BLD MANUAL: 0.8 X10*3/UL (ref 0–0.5)
NEUTS BAND NFR BLD MANUAL: 16.7 %
NEUTS SEG # BLD MANUAL: 3.49 X10*3/UL (ref 1.6–5)
NEUTS SEG NFR BLD MANUAL: 72.8 %
NRBC BLD-RTO: 0 /100 WBCS (ref 0–0)
OXYHGB MFR BLDV: 47.1 % (ref 45–75)
PCO2 BLDV: 45 MM HG (ref 41–51)
PH BLDV: 7.29 PH (ref 7.33–7.43)
PHOSPHATE SERPL-MCNC: 2.3 MG/DL (ref 2.5–4.9)
PLATELET # BLD AUTO: 161 X10*3/UL (ref 150–450)
PO2 BLDV: 34 MM HG (ref 35–45)
POLYCHROMASIA BLD QL SMEAR: ABNORMAL
POTASSIUM BLDV-SCNC: 5.3 MMOL/L (ref 3.5–5.3)
POTASSIUM SERPL-SCNC: 4.3 MMOL/L (ref 3.5–5.3)
RBC # BLD AUTO: 4.33 X10*6/UL (ref 4–5.2)
RBC MORPH BLD: ABNORMAL
SAO2 % BLDV: 48 % (ref 45–75)
SODIUM BLDV-SCNC: 126 MMOL/L (ref 136–145)
SODIUM SERPL-SCNC: 133 MMOL/L (ref 136–145)
TACROLIMUS BLD-MCNC: 15.6 NG/ML
TOTAL CELLS COUNTED BLD: 114
WBC # BLD AUTO: 4.8 X10*3/UL (ref 4.4–11.3)

## 2024-05-07 PROCEDURE — 80197 ASSAY OF TACROLIMUS: CPT

## 2024-05-07 PROCEDURE — 85007 BL SMEAR W/DIFF WBC COUNT: CPT

## 2024-05-07 PROCEDURE — 85027 COMPLETE CBC AUTOMATED: CPT

## 2024-05-07 PROCEDURE — 1210000001 HC SEMI-PRIVATE ROOM DAILY

## 2024-05-07 PROCEDURE — 2500000005 HC RX 250 GENERAL PHARMACY W/O HCPCS

## 2024-05-07 PROCEDURE — 99233 SBSQ HOSP IP/OBS HIGH 50: CPT

## 2024-05-07 PROCEDURE — 2500000002 HC RX 250 W HCPCS SELF ADMINISTERED DRUGS (ALT 637 FOR MEDICARE OP, ALT 636 FOR OP/ED): Performed by: STUDENT IN AN ORGANIZED HEALTH CARE EDUCATION/TRAINING PROGRAM

## 2024-05-07 PROCEDURE — 2500000004 HC RX 250 GENERAL PHARMACY W/ HCPCS (ALT 636 FOR OP/ED): Performed by: STUDENT IN AN ORGANIZED HEALTH CARE EDUCATION/TRAINING PROGRAM

## 2024-05-07 PROCEDURE — 2500000002 HC RX 250 W HCPCS SELF ADMINISTERED DRUGS (ALT 637 FOR MEDICARE OP, ALT 636 FOR OP/ED): Mod: MUE

## 2024-05-07 PROCEDURE — 82947 ASSAY GLUCOSE BLOOD QUANT: CPT | Mod: 91,MUE

## 2024-05-07 PROCEDURE — 2500000004 HC RX 250 GENERAL PHARMACY W/ HCPCS (ALT 636 FOR OP/ED)

## 2024-05-07 PROCEDURE — 80069 RENAL FUNCTION PANEL: CPT

## 2024-05-07 PROCEDURE — 83735 ASSAY OF MAGNESIUM: CPT

## 2024-05-07 PROCEDURE — 2500000001 HC RX 250 WO HCPCS SELF ADMINISTERED DRUGS (ALT 637 FOR MEDICARE OP): Performed by: STUDENT IN AN ORGANIZED HEALTH CARE EDUCATION/TRAINING PROGRAM

## 2024-05-07 PROCEDURE — 36415 COLL VENOUS BLD VENIPUNCTURE: CPT

## 2024-05-07 PROCEDURE — 99233 SBSQ HOSP IP/OBS HIGH 50: CPT | Performed by: INTERNAL MEDICINE

## 2024-05-07 PROCEDURE — 97162 PT EVAL MOD COMPLEX 30 MIN: CPT | Mod: GP | Performed by: PHYSICAL THERAPIST

## 2024-05-07 RX ORDER — INSULIN LISPRO 100 [IU]/ML
3 INJECTION, SOLUTION INTRAVENOUS; SUBCUTANEOUS
Status: DISCONTINUED | OUTPATIENT
Start: 2024-05-07 | End: 2024-05-08

## 2024-05-07 RX ORDER — INSULIN GLARGINE 100 [IU]/ML
25 INJECTION, SOLUTION SUBCUTANEOUS NIGHTLY
Status: DISCONTINUED | OUTPATIENT
Start: 2024-05-07 | End: 2024-05-07

## 2024-05-07 RX ORDER — INSULIN GLARGINE 100 [IU]/ML
30 INJECTION, SOLUTION SUBCUTANEOUS NIGHTLY
Status: DISCONTINUED | OUTPATIENT
Start: 2024-05-07 | End: 2024-05-08

## 2024-05-07 RX ORDER — TACROLIMUS 0.5 MG/1
0.5 CAPSULE ORAL
Status: DISCONTINUED | OUTPATIENT
Start: 2024-05-07 | End: 2024-05-09 | Stop reason: HOSPADM

## 2024-05-07 RX ADMIN — CARVEDILOL 50 MG: 12.5 TABLET, FILM COATED ORAL at 21:43

## 2024-05-07 RX ADMIN — INSULIN LISPRO 5 UNITS: 100 INJECTION, SOLUTION INTRAVENOUS; SUBCUTANEOUS at 18:39

## 2024-05-07 RX ADMIN — Medication 2000 UNITS: at 09:13

## 2024-05-07 RX ADMIN — MYCOPHENOLATE MOFETIL 250 MG: 500 TABLET, FILM COATED ORAL at 09:13

## 2024-05-07 RX ADMIN — CEFTRIAXONE SODIUM 1 G: 1 INJECTION, SOLUTION INTRAVENOUS at 09:17

## 2024-05-07 RX ADMIN — INSULIN LISPRO 3 UNITS: 100 INJECTION, SOLUTION INTRAVENOUS; SUBCUTANEOUS at 15:02

## 2024-05-07 RX ADMIN — INSULIN LISPRO 3 UNITS: 100 INJECTION, SOLUTION INTRAVENOUS; SUBCUTANEOUS at 18:47

## 2024-05-07 RX ADMIN — INSULIN LISPRO 3 UNITS: 100 INJECTION, SOLUTION INTRAVENOUS; SUBCUTANEOUS at 15:03

## 2024-05-07 RX ADMIN — MYCOPHENOLATE MOFETIL 250 MG: 500 TABLET, FILM COATED ORAL at 21:42

## 2024-05-07 RX ADMIN — TACROLIMUS 1.5 MG: 1 CAPSULE ORAL at 09:12

## 2024-05-07 RX ADMIN — HYDRALAZINE HYDROCHLORIDE 100 MG: 25 TABLET ORAL at 21:43

## 2024-05-07 RX ADMIN — CARVEDILOL 50 MG: 12.5 TABLET, FILM COATED ORAL at 09:13

## 2024-05-07 RX ADMIN — TACROLIMUS 0.5 MG: 0.5 CAPSULE ORAL at 18:39

## 2024-05-07 RX ADMIN — HYDRALAZINE HYDROCHLORIDE 100 MG: 25 TABLET ORAL at 09:13

## 2024-05-07 RX ADMIN — SODIUM PHOSPHATE, MONOBASIC, MONOHYDRATE AND SODIUM PHOSPHATE, DIBASIC, ANHYDROUS 15 MMOL: 142; 276 INJECTION, SOLUTION INTRAVENOUS at 12:37

## 2024-05-07 RX ADMIN — HYDRALAZINE HYDROCHLORIDE 100 MG: 25 TABLET ORAL at 17:39

## 2024-05-07 RX ADMIN — PREDNISONE 5 MG: 5 TABLET ORAL at 09:12

## 2024-05-07 RX ADMIN — ATORVASTATIN CALCIUM 10 MG: 20 TABLET, FILM COATED ORAL at 21:43

## 2024-05-07 RX ADMIN — INSULIN GLARGINE 30 UNITS: 100 INJECTION, SOLUTION SUBCUTANEOUS at 21:47

## 2024-05-07 RX ADMIN — INSULIN LISPRO 4 UNITS: 100 INJECTION, SOLUTION INTRAVENOUS; SUBCUTANEOUS at 09:14

## 2024-05-07 RX ADMIN — HEPARIN SODIUM 5000 UNITS: 5000 INJECTION INTRAVENOUS; SUBCUTANEOUS at 18:39

## 2024-05-07 ASSESSMENT — COGNITIVE AND FUNCTIONAL STATUS - GENERAL
MOVING TO AND FROM BED TO CHAIR: A LOT
STANDING UP FROM CHAIR USING ARMS: A LOT
MOVING TO AND FROM BED TO CHAIR: A LITTLE
WALKING IN HOSPITAL ROOM: A LOT
TURNING FROM BACK TO SIDE WHILE IN FLAT BAD: A LOT
MOBILITY SCORE: 12
STANDING UP FROM CHAIR USING ARMS: A LOT
HELP NEEDED FOR BATHING: A LITTLE
DAILY ACTIVITIY SCORE: 18
TURNING FROM BACK TO SIDE WHILE IN FLAT BAD: A LOT
PERSONAL GROOMING: A LITTLE
DRESSING REGULAR LOWER BODY CLOTHING: A LOT
STANDING UP FROM CHAIR USING ARMS: A LITTLE
DRESSING REGULAR UPPER BODY CLOTHING: A LOT
HELP NEEDED FOR BATHING: A LOT
MOBILITY SCORE: 18
MOBILITY SCORE: 12
DRESSING REGULAR LOWER BODY CLOTHING: A LITTLE
WALKING IN HOSPITAL ROOM: A LITTLE
CLIMB 3 TO 5 STEPS WITH RAILING: TOTAL
DAILY ACTIVITIY SCORE: 15
CLIMB 3 TO 5 STEPS WITH RAILING: TOTAL
MOVING FROM LYING ON BACK TO SITTING ON SIDE OF FLAT BED WITH BEDRAILS: A LITTLE
EATING MEALS: A LITTLE
TOILETING: A LOT
PERSONAL GROOMING: A LITTLE
DRESSING REGULAR UPPER BODY CLOTHING: A LITTLE
MOVING FROM LYING ON BACK TO SITTING ON SIDE OF FLAT BED WITH BEDRAILS: A LITTLE
MOVING FROM LYING ON BACK TO SITTING ON SIDE OF FLAT BED WITH BEDRAILS: A LITTLE
TURNING FROM BACK TO SIDE WHILE IN FLAT BAD: A LITTLE
WALKING IN HOSPITAL ROOM: A LOT
CLIMB 3 TO 5 STEPS WITH RAILING: A LITTLE
TOILETING: A LITTLE
MOVING TO AND FROM BED TO CHAIR: A LOT

## 2024-05-07 ASSESSMENT — ACTIVITIES OF DAILY LIVING (ADL): ADLS_ADDRESSED: NO

## 2024-05-07 ASSESSMENT — PAIN SCALES - GENERAL
PAINLEVEL_OUTOF10: 0 - NO PAIN

## 2024-05-07 ASSESSMENT — PAIN - FUNCTIONAL ASSESSMENT
PAIN_FUNCTIONAL_ASSESSMENT: 0-10

## 2024-05-07 NOTE — PROGRESS NOTES
"Jacklyn Villa is a 73 y.o. female on day 2 of admission presenting with Hyperglycemia.      Subjective   5/7: Pt seen resting in bed. More alert today and seems close to her baseline. Denies acute complaints.       Objective          Vitals 24HR  Heart Rate:  [89-95]   Temp:  [36.2 °C (97.2 °F)-36.7 °C (98.1 °F)]   Resp:  [16-18]   BP: (136-182)/(64-79)   Height:  [165.1 cm (5' 5\")]   Weight:  [95.3 kg (210 lb)]   SpO2:  [92 %-100 %]     Intake/Output last 3 Shifts:    Intake/Output Summary (Last 24 hours) at 5/7/2024 1455  Last data filed at 5/7/2024 1045  Gross per 24 hour   Intake 1200 ml   Output 1050 ml   Net 150 ml       Physical Exam  General appearance: AAOx3. Looks weak  Eyes: non-icteric  Skin: no apparent rash  Heart: rhythm regular.  Lungs: CTA bilat.  np wheezing/crackles, on 2L nc  Abdomen: soft, nt/nd  Extremities: no edema bilat  : no Singh  Neuro: No FND, follows commands  Dialysis ACCESS: no      Relevant Results    Current Facility-Administered Medications:     [Held by provider] aspirin EC tablet 325 mg, 325 mg, oral, Daily, Solange Flores MD    atorvastatin (Lipitor) tablet 10 mg, 10 mg, oral, Nightly, Solange Flores MD, 10 mg at 05/06/24 2120    carvedilol (Coreg) tablet 50 mg, 50 mg, oral, BID, Solange Flores MD, 50 mg at 05/07/24 0913    cefTRIAXone (Rocephin) IVPB 1 g, 1 g, intravenous, q24h, Inocencio Morris MD, Stopped at 05/07/24 0947    cholecalciferol (Vitamin D-3) tablet 2,000 Units, 2,000 Units, oral, Daily, Solange Flores MD, 2,000 Units at 05/07/24 0913    dextrose 50 % injection 12.5 g, 12.5 g, intravenous, q15 min PRN, Solange Flores MD    dextrose 50 % injection 25 g, 25 g, intravenous, q15 min PRN, Solange Flores MD    glucagon (Glucagen) injection 1 mg, 1 mg, intramuscular, q15 min PRN, Solange Flores MD    glucagon (Glucagen) injection 1 mg, 1 mg, intramuscular, q15 min PRN, Solange Flores MD    heparin (porcine) injection 5,000 Units, 5,000 Units, subcutaneous, " q8h, Miriam Garcia MD, 5,000 Units at 05/05/24 0423    hydrALAZINE (Apresoline) tablet 100 mg, 100 mg, oral, TID, Inocencio Morris MD, 100 mg at 05/07/24 0913    insulin glargine (Lantus) injection 30 Units, 30 Units, subcutaneous, Nightly, Miriam Garcia MD    insulin lispro (HumaLOG) injection 0-5 Units, 0-5 Units, subcutaneous, TID with meals, Solange Flores MD, 4 Units at 05/07/24 0914    insulin lispro (HumaLOG) injection 3 Units, 3 Units, subcutaneous, TID with meals, Miriam Garcia MD    mycophenolate (Cellcept) tablet 250 mg, 250 mg, oral, BID, Inocencio Morris MD, 250 mg at 05/07/24 0913    polyethylene glycol (Glycolax, Miralax) packet 17 g, 17 g, oral, Daily, Solange Flores MD, 17 g at 05/05/24 0905    predniSONE (Deltasone) tablet 5 mg, 5 mg, oral, Daily, Solange Flores MD, 5 mg at 05/07/24 0912    sodium phosphate 15 mmol in sodium chloride 0.9% 250 mL IV, 15 mmol, intravenous, Once, Miriam Garcia MD, Last Rate: 62.5 mL/hr at 05/07/24 1237, 15 mmol at 05/07/24 1237    tacrolimus (Prograf) capsule 1.5 mg, 1.5 mg, oral, q12h JENNIFER, Miriam Garcia MD, 1.5 mg at 05/07/24 0912    Results for orders placed or performed during the hospital encounter of 05/04/24 (from the past 24 hour(s))   POCT GLUCOSE   Result Value Ref Range    POCT Glucose 236 (H) 74 - 99 mg/dL   POCT GLUCOSE   Result Value Ref Range    POCT Glucose 300 (H) 74 - 99 mg/dL   CBC and Auto Differential   Result Value Ref Range    WBC 4.8 4.4 - 11.3 x10*3/uL    nRBC 0.0 0.0 - 0.0 /100 WBCs    RBC 4.33 4.00 - 5.20 x10*6/uL    Hemoglobin 12.1 12.0 - 16.0 g/dL    Hematocrit 34.8 (L) 36.0 - 46.0 %    MCV 80 80 - 100 fL    MCH 27.9 26.0 - 34.0 pg    MCHC 34.8 32.0 - 36.0 g/dL    RDW 13.9 11.5 - 14.5 %    Platelets 161 150 - 450 x10*3/uL    Immature Granulocytes %, Automated 0.6 0.0 - 0.9 %    Immature Granulocytes Absolute, Automated 0.03 0.00 - 0.50 x10*3/uL   Renal Function Panel   Result Value Ref Range    Glucose 286 (H) 74 - 99 mg/dL    Sodium 133 (L) 136 - 145  mmol/L    Potassium 4.3 3.5 - 5.3 mmol/L    Chloride 99 98 - 107 mmol/L    Bicarbonate 23 21 - 32 mmol/L    Anion Gap 15 10 - 20 mmol/L    Urea Nitrogen 55 (H) 6 - 23 mg/dL    Creatinine 1.13 (H) 0.50 - 1.05 mg/dL    eGFR 52 (L) >60 mL/min/1.73m*2    Calcium 9.4 8.6 - 10.6 mg/dL    Phosphorus 2.3 (L) 2.5 - 4.9 mg/dL    Albumin 2.8 (L) 3.4 - 5.0 g/dL   Magnesium   Result Value Ref Range    Magnesium 2.49 (H) 1.60 - 2.40 mg/dL   Tacrolimus level   Result Value Ref Range    Tacrolimus  15.6 (H) <=15.0 ng/mL   Manual Differential   Result Value Ref Range    Neutrophils %, Manual 72.8 40.0 - 80.0 %    Bands %, Manual 16.7 0.0 - 5.0 %    Lymphocytes %, Manual 2.6 13.0 - 44.0 %    Monocytes %, Manual 7.0 2.0 - 10.0 %    Eosinophils %, Manual 0.0 0.0 - 6.0 %    Basophils %, Manual 0.9 0.0 - 2.0 %    Seg Neutrophils Absolute, Manual 3.49 1.60 - 5.00 x10*3/uL    Bands Absolute, Manual 0.80 (H) 0.00 - 0.50 x10*3/uL    Lymphocytes Absolute, Manual 0.12 (L) 0.80 - 3.00 x10*3/uL    Monocytes Absolute, Manual 0.34 0.05 - 0.80 x10*3/uL    Eosinophils Absolute, Manual 0.00 0.00 - 0.40 x10*3/uL    Basophils Absolute, Manual 0.04 0.00 - 0.10 x10*3/uL    Total Cells Counted 114     Neutrophils Absolute, Manual 4.29 1.60 - 5.50 x10*3/uL    RBC Morphology See Below     Polychromasia Mild     [place] Cells Few     Acanthocytes Few    POCT GLUCOSE   Result Value Ref Range    POCT Glucose 314 (H) 74 - 99 mg/dL   POCT GLUCOSE   Result Value Ref Range    POCT Glucose 299 (H) 74 - 99 mg/dL        Assessment and Plan  73 y.o. 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 Rivera, KDPI 78%, PRA 96%. HCV -/ R -, HBcAb D+/R HBsAb+ on Vemlidy,CMV D+/R+, EBV D+ / R-). Left donor kidney, transplanted to patient right pelvis. H/o DGF post operatively needing dialysis once due to hyperkalemia with eventual renal recovery who presentd on 5/4/2024 for lethargy, generalized weakness, loss of appetite, and  diarrhea. Reports skipping all meds for several days. Labs notable for hyperglycemia, mild DKA, SHWETA.      Allograft function: s/p DDKT 8/2022  SHWETA Etiology: volume depletion, UTI  - Cr trend 1.2 - 1.1  - baseline Cr 0.9-1.   - Electrolytes: K and HCO3 WNL  - nonoliguric - 1.0L in 24h  - UA with +ve LE, mod pyuria concerning for UTI. pls get txp renal US.    - urine lytes with Na 24, Urine Cr 66  - Bps above goal. No hypervolemia on exam.   - Transplant Renal US - negative for hydronephrosis, resistive indices WNL  - UPC 1.5  - renal function stable  - follow-up DSA/HLA antibody panel in process since 5/5    Immunosuppression:  - home: tac 1.5mg q12h  - tacrolimus goal 5-8; check tac level 30min-1h before AM dose; ensure dosed 12 h apart  - FK level 5/5: <2 (h/o skipping meds for several days)  - FK level 5/6: 17 but not true trough  - FK level 5/7: 15.6 - true trough  - decrease tacrolimus to 0.5mg BID starting tonight  - continue  mg bid and pred 5mg/d.    UTI Klebsiella  - Urine cx 5/4 with klebsiella pneumonia  - On ceftriaxone.   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 Pt has been on treatment dose Valcyte several months. Last CMV PCR neg 4/8/24. Rpt CMV PCR on 5/5 is negative.     Anemia  - Hb 12, acceptable.     Mineral Disease of Bone  - Ca, Phos acceptable. Monitor.     D/w Dr. Micheline Sue MD  Nephrology Fellow PGY 5  Contact via secure chat

## 2024-05-07 NOTE — PROGRESS NOTES
Jacklyn Villa is a 73 y.o. female on day 2 of admission presenting with Hyperglycemia.  Transitional Care Coordination Progress Note:  Patient discussed during interdisciplinary rounds.   Team members present: MD and TCC  Plan per Medical/Surgical team: Waiting on PT/Ot to evaluate and fixing Insulin Management.  Payor: [employer]  Discharge disposition: Waiting on PT/OT recommendations  Potential Barriers: None  ADOD: 05/09/24    KENIA WALLACE RN

## 2024-05-07 NOTE — PROGRESS NOTES
"Jacklyn Villa is a 73 y.o. female on day 2 of admission presenting with Hyperglycemia.    Subjective   NAEO. States that she feels better this morning, was less lethargic. Denies any CP, SOB, N/V, abdominal pain.     Objective   Physical Exam  Constitutional:       General: She is not in acute distress.     Appearance: She is not ill-appearing.      Comments: Easily awakens to voice, answers questions appropriately    HENT:      Head: Normocephalic and atraumatic.   Eyes:      Extraocular Movements: Extraocular movements intact.      Pupils: Pupils are equal, round, and reactive to light.   Cardiovascular:      Rate and Rhythm: Normal rate.      Heart sounds: Murmur heard.      Comments: Systolic murmur most prominent in RUSB  Pulmonary:      Effort: Pulmonary effort is normal. No respiratory distress.      Breath sounds: Normal breath sounds.      Comments: Currently on 2L NC  Abdominal:      General: Abdomen is flat. Bowel sounds are normal. There is no distension.      Palpations: Abdomen is soft.      Tenderness: There is no abdominal tenderness. There is no guarding.   Musculoskeletal:         General: No swelling or tenderness.   Skin:     General: Skin is warm and dry.   Neurological:      General: No focal deficit present.      Mental Status: She is oriented to person, place, and time.      Cranial Nerves: No cranial nerve deficit.       Last Recorded Vitals  Blood pressure 171/76, pulse 95, temperature 36.3 °C (97.3 °F), resp. rate 18, height 1.651 m (5' 5\"), weight 95.3 kg (210 lb), SpO2 98%.  Intake/Output last 3 Shifts:  No intake/output data recorded.    Relevant Results  Scheduled medications  [Held by provider] aspirin, 325 mg, oral, Daily  atorvastatin, 10 mg, oral, Nightly  carvedilol, 50 mg, oral, BID  cefTRIAXone, 1 g, intravenous, q24h  cholecalciferol, 2,000 Units, oral, Daily  [Held by provider] dapagliflozin propanediol, 10 mg, oral, Daily  [Held by provider] heparin (porcine), 5,000 Units, " subcutaneous, q8h  hydrALAZINE, 100 mg, oral, TID  insulin glargine, 20 Units, subcutaneous, Nightly  insulin lispro, 0-5 Units, subcutaneous, TID with meals  mycophenolate, 250 mg, oral, BID  polyethylene glycol, 17 g, oral, Daily  predniSONE, 5 mg, oral, Daily  tacrolimus, 1.5 mg, oral, q12h JENNIFER      Continuous medications  lactated Ringer's, 75 mL/hr, Last Rate: 75 mL/hr (05/05/24 1311)      PRN medications  PRN medications: dextrose, dextrose, glucagon, glucagon    Results for orders placed or performed during the hospital encounter of 05/04/24 (from the past 24 hour(s))   Urine electrolytes   Result Value Ref Range    Sodium, Urine Random 24 mmol/L    Sodium/Creatinine Ratio 36 Not established. mmol/g Creat    Potassium, Urine Random 20 mmol/L    Potassium/Creatinine Ratio 30 Not established mmol/g Creat    Chloride, Urine Random 23 mmol/L    Chloride/Creatinine Ratio 35 (L) 38 - 318 mmol/g creat    Creatinine, Urine Random 66.6 20.0 - 320.0 mg/dL   Protein, Urine Random   Result Value Ref Range    Total Protein, Urine Random 105 (H) 5 - 24 mg/dL    Creatinine, Urine Random 66.6 20.0 - 320.0 mg/dL    T. Protein/Creatinine Ratio 1.58 (H) 0.00 - 0.17 mg/mg Creat   POCT GLUCOSE   Result Value Ref Range    POCT Glucose 298 (H) 74 - 99 mg/dL   Stool Pathogen Panel, PCR    Specimen: Stool   Result Value Ref Range    Campylobacter Group Not Detected Not Detected    Salmonella species Not Detected Not Detected    Shigella species Not Detected Not Detected    Vibrio Group Not Detected Not Detected    Yersinia Enterocolitica Not Detected Not Detected    Shiga Toxin 1 Not Detected Not Detected    Shiga Toxin 2 Not Detected Not Detected    Norovirus GI/GII Not Detected Not Detected    Rotavirus A Not Detected Not Detected   POCT GLUCOSE   Result Value Ref Range    POCT Glucose 280 (H) 74 - 99 mg/dL   Transthoracic Echo (TTE) Complete   Result Value Ref Range    AV pk melodie 1.92 m/s    LVOT diam 2.00 cm    LV Biplane EF 65 %  "   MV avg E/e' ratio 19.67     MV E/A ratio 0.73     Tricuspid annular plane systolic excursion 2.3 cm    RV free wall pk S' 13.70 cm/s    LVIDd 4.50 cm    Aortic Valve Area by Continuity of Peak Velocity 2.16 cm2    AV pk grad 14.7 mmHg    LV A4C EF 68.9    POCT GLUCOSE   Result Value Ref Range    POCT Glucose 236 (H) 74 - 99 mg/dL   POCT GLUCOSE   Result Value Ref Range    POCT Glucose 300 (H) 74 - 99 mg/dL     Assessment/Plan   Principal Problem:    Hyperglycemia  72F PMHx ESRD 2/2 IDDM2 and HTN 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 Labs on presentation notable for hyperglycemia with borderline anion gap c/f DKA. UA is notable for leukocyte esterase and WBCs. At baseline, the pt is AOX4, drives, manages her own medications, and is independent for her ADLs. Per son, since moving in a week ago, she has been \"shaky\", had worsening diarrhea, and \"slower with responses\". Unclear if she has been taking her medications during that time. Given her baseline and current AMS, was started on CTX. Transplant nephrology is following, appreciate recs. Will send CMV PCR as well given hx of CMV viremia.     Updates 5/7:  - Given pt is more awake this morning, will hold on continuous fluids and encourage oral intake. FENa suggests pre-renal etiology.   - PF4 negative - thrombocytopenia likely reactive 2/2 infection  - Transplant nephrology following, appreciate recs  - Urine culture - enteric bacilli. C/w CTX for now.  - Kidney ultrasound - unremarkable, improved intrarenal arterial resistive indices   - Insulin: 30u glargine, 3u lispro with meals    #DKA - resolved   #hyperglycemia   #NIDDM2  ::home meds: farxiga 10 mg, ozempic 2 mg, lantus 15, lispro sliding scale   ::A1c 6.8 on 5/5   :: s/p 15u lantus + 8 lispro in ED, 10u NPH "   -carb controlled diet  -hold home OHAs for now  -c/w mild ISS and qACHS glucose checks  - Increased to 30u lantus daily + 3u lispro with meals + ISS - will adjust as necessary    #AMS  #UTI  :: Per collateral from family - pt baseline aox3, independent for all ADLs, drives car, manages medications  :: Currently oriented x3 but very drowsy, slow to respond, confused  :: UA 2+ bacteria, + LE, - nitrites  :: CT head non-con: no evidence of acute hemorrhage/mass lesion/acute infarction  - Ucx - enteric bacilli   - Started on CTX (5/5- *)     #ESRD s/p DDRT 8/2022   #CMV viremia   #SHWETA - improving  ::per transplant nephrology notes, patient should be on 500 mg MMF BID, tacrolimus 1.5 mg BID, and 5 mg prednisone  :: Cr on admission 1.68, baseline ~1 -> improving after fluids, likely pre-renal. Pt having diarrhea, frequent urination  :: s/p 3L LR, on 75 ml LR/hr  -clarify aspirin 325 rx with transplant; prior rx appears to have come from transplant, unclear if patient should be on it   -previously on vemlidy for positive HepB core ab, treatment completed   - Renal ultrasound with doppler - unremarkable, improved intrarenal arterial resistive indices   -transplant nephrology following  - FENa - pre-renal 0.3%  -Restarted on tacrolimus 1.5mg BID, prednisone 5mg, MMF 250mg BID  [ ] Follow up CMV PCR, DSA     #Thrombocytopenia  :: Platelets 104 from 216 on 4/2024  :: possibly reactive given infection  - Stopped sqheparin for DVT prophylaxis  - PF4 negative     #Systolic ejection murmur   ::Cardiology notes from 2021 document grade II ESM; audible ESM heard on exam on admission so murmur is not new   -repeat TTE ordered; last TTE in 2022 suggested possible LVOT obstruction     #hypertonic hyponatremia  ::Na 127 on admission, 134 corrected for BG, serum osm 310   -s/p 3L IVF in ED   -Currently on 75cc/hr LR      #diarrhea   -check stool pathogen PCR   -CMV PCR pending   -if transplant nephrology is concerned for CMV enteritis,  may require GI consultation and biopsy      #HLD  -c/w home lovastatin 10mg     #HTN   -resume home coreg 50 BID  -Restarted on home hydralazine 100mg TID     F: s/p 3L, 75cc/hr LR  E: Replete prn  N: diabetic  GI: deferred   DVT: SCD  Abx: CTX  Access: PIV    CODE: FULL (confirmed on admission)  NOK: sibling Gabbie [phone]      Miriam Garcia MD

## 2024-05-07 NOTE — PROGRESS NOTES
Physical Therapy    Physical Therapy Evaluation    Patient Name: Jacklyn Villa  MRN: [id number]  Today's Date: 5/7/2024   Time Calculation  Start Time: 1143  Stop Time: 1211  Time Calculation (min): 28 min    Assessment/Plan   PT Assessment  PT Assessment Results: Decreased strength, Decreased endurance, Impaired balance, Decreased mobility, Decreased cognition, Impaired judgement, Decreased safety awareness  Rehab Prognosis: Good  Evaluation/Treatment Tolerance: Patient limited by fatigue  Medical Staff Made Aware: Yes  Strengths: Ability to acquire knowledge, Attitude of self, Coping skills, Premorbid level of function, Support of Caregivers  Barriers to Participation: Other (Comment) (fatigue)  End of Session Communication: Bedside nurse, PCT/NA/CTA  Assessment Comment: 74 yo female with acute hypoxic respiratory failure, SHWETA, UTI presents with lethargy, metabolic encephalopathy, decreased activity tolerance, mild confusion and significant balance deficits. Recommend moderate intensity therapy as pt needs all 3 therapies, is not at baseline, appears motivated/making gains, is home alone and has stairs.  End of Session Patient Position: Bed, 3 rail up, Alarm on  IP OR SWING BED PT PLAN  Inpatient or Swing Bed: Inpatient  PT Plan  Treatment/Interventions: Bed mobility, Transfer training, Gait training, Stair training, Balance training, Neuromuscular re-education, Endurance training, Therapeutic exercise, Therapeutic activity, Home exercise program, Postural re-education, Strengthening  PT Plan: Skilled PT  PT Frequency: 3 times per week  PT Discharge Recommendations: Moderate intensity level of continued care  Equipment Recommended upon Discharge: Other (comment) (TBD at SNF)  PT Recommended Transfer Status: Assist x1, Assistive device (min A with WW)  PT - OK to Discharge: Yes (PT eval completed and DC recs made)      Subjective   General Visit Information:  General  Reason for Referral: Admitted 5/4 with  shakiness, lethargy, loss of appetitie, lethargy x 3 days, diarrhea, hyperglycemia; dx: hyperglycemia, acute hypoxic respiratory failure, metabolic encephalopathy, SHWETA, UTI  Past Medical History Relevant to Rehab: ESRD secondary to diabetes s/p kidney transplant (8/19/22), CMV, T2DM, HTN, cataracts  Missed Visit: No  Family/Caregiver Present: No  Co-Treatment:  (N/A)  Prior to Session Communication: Bedside nurse  Patient Position Received: Bed, 3 rail up, Alarm on  Preferred Learning Style: verbal, kinesthetic  General Comment: Pt supine a little sleepy had been incontinent of bowel so PT/CTA cleanred her up. Today pt presents with mild confusion, able to ambulate short distance with WW (lost her balance requiring min A x 3 during short walk) and fatigued so assisted back to bed.  Home Living:  Home Living  Type of Home: House  Lives With: Other (Comment) (son (works days))  Home Adaptive Equipment: None  Home Layout: Multi-level (5 PAM with 1 rail, bed/tub combo (no equip) on 1st floor; laundry in basement (son does it))  Prior Level of Function:  Prior Function Per Pt/Caregiver Report  Level of Eddy:  (ind amb in/outdoors no device, ind stairclimbing, no falls)  Receives Help From:  (son does laundry/cooking/cleaning/groceries)  ADL Assistance:  (ind dress/shower)  Vocational: Retired (nursing assistant)  Hand Dominance: Right  Precautions:  Precautions  Medical Precautions: Fall precautions (balance deficits, metabolic encephalopathy, DM, HTN)  Vital Signs:  Vital Signs  Heart Rate:  (sitting post gait: HR 93  O2 sat 95%)    Objective   Pain:  Pain Assessment  Pain Assessment: 0-10  Pain Score: 0 - No pain  Cognition:  Cognition  Overall Cognitive Status: Impaired  Arousal/Alertness: Delayed responses to stimuli  Orientation Level: Oriented X4  Following Commands:  (follows 90% of simple 1 step commands with demo/repetition)  Safety Judgment: Good awareness of safety precautions  Problem Solving:  "Assistance required to identify errors made  Cognition Comments: groggy, \"feeling a little fuzzy\" when standing being cleaned off  Problem Solving: Exceptions to WFL  Insight: Mild  Processing Speed: Delayed    General Assessments:                  Activity Tolerance  Endurance: Tolerates 10 - 20 min exercise with multiple rests, Other (Comment) (fatigued after short bout ambulation, PT assisted back to bed)    Sensation  Light Touch: Not tested       Postural Control  Postural Control: Impaired  Trunk Control: decreased in standing (lost balance 3x walking with WW requiring min A)  Posture Comment: slight posterior lean    Static Sitting Balance  Static Sitting-Balance Support: Feet supported, Bilateral upper extremity supported  Static Sitting-Level of Assistance: Contact guard    Static Standing Balance  Static Standing-Balance Support: Bilateral upper extremity supported (on WW)  Static Standing-Level of Assistance: Minimum assistance (CG/min A)  Dynamic Standing Balance  Dynamic Standing-Balance Support: Bilateral upper extremity supported (on WW)  Dynamic Standing-Balance:  (gait including turns)  Dynamic Standing-Comments: min (lost balance 3x during gait)  Functional Assessments:  ADL  ADL's Addressed: No    Bed Mobility  Bed Mobility: Yes  Bed Mobility 1  Bed Mobility 1: Rolling right  Level of Assistance 1: Minimum assistance, Minimal verbal cues, Minimal tactile cues  Bed Mobility Comments 1: use of rail  Bed Mobility 2  Bed Mobility  2: Side lying right to sit (sit to Right sidelying)  Level of Assistance 2: Moderate assistance, Minimal verbal cues, Moderate tactile cues  Bed Mobility Comments 2: HOB elevated 45 degrees, use of rail  Bed Mobility 3  Bed Mobility 3: Scooting  Level of Assistance 3: Dependent (+2)    Transfers  Transfer: Yes  Transfer 1  Transfer From 1: Sit to, Stand to  Transfer to 1: Sit, Stand  Technique 1: Sit to stand, Stand to sit  Transfer Device 1: Walker  Transfer Level of " Assistance 1: Minimum assistance, Minimal verbal cues, Minimal tactile cues    Ambulation/Gait Training  Ambulation/Gait Training Performed: Yes  Ambulation/Gait Training 1  Surface 1: Level tile  Device 1: Rolling walker  Assistance 1: Minimum assistance, Minimal verbal cues, Minimal tactile cues  Quality of Gait 1:  (lost balance 3 times posteriorly requiring min A, intermittent A with walker, slower, cues for sequencing)  Comments/Distance (ft) 1: 20    Stairs  Stairs: No (pt too fatigued with walking)  Extremity/Trunk Assessments:  RUE   RUE : Exceptions to WFL  RUE Strength  RUE Overall Strength:  (grasp 4+, elbow flex/ext 4, shoulder elevation 4-)  LUE   LUE: Exceptions to WFL  LUE Strength  LUE Overall Strength:  (grasp 4+, elbow flex/ext 4, shoulder elevation 4-)  RLE   RLE : Exceptions to WFL  Strength RLE  RLE Overall Strength:  (ankle DF ankle knee flex/ext grossly 4/5, hip flexion >3)  LLE   LLE :  (ankle DF ankle knee flex/ext grossly 4/5, hip flexion >3)  Outcome Measures:  [facility] Basic Mobility  Turning from your back to your side while in a flat bed without using bedrails: A little  Moving from lying on your back to sitting on the side of a flat bed without using bedrails: A lot  Moving to and from bed to chair (including a wheelchair): A lot  Standing up from a chair using your arms (e.g. wheelchair or bedside chair): A lot  To walk in hospital room: A lot  Climbing 3-5 steps with railing: Total  Basic Mobility - Total Score: 12    Encounter Problems       Encounter Problems (Active)       General Goals       oriented x 4 and follows 100% of 2 step commands without additional cues (Progressing)       Start:  05/07/24    Expected End:  05/21/24            supine to/from sit (HOB flat, no rail) with supervision/cues (Progressing)       Start:  05/07/24    Expected End:  05/21/24            sit to/from stand, bed to/from chair with WW and SBA, no LOB, stable vitals (Progressing)       Start:  05/07/24     Expected End:  05/21/24               Mobility       ambulate 130' with WW and SBA no LOB, stable vitals (Progressing)       Start:  05/07/24    Expected End:  05/21/24            up/down 5 steps with 1 rail with SBA (Not Progressing)       Start:  05/07/24    Expected End:  05/21/24            5 Times Sit to Stand with supervision, no LOB <15 seconds (Progressing)       Start:  05/07/24    Expected End:  05/21/24                   Education Documentation  Precautions, taught by Rebecca Bach PT at 5/7/2024 12:24 PM.  Learner: Patient  Readiness: Acceptance  Method: Explanation, Demonstration, Teach-back  Response: Needs Reinforcement  Comment: reoriernted, PT purpose/POC, DC recs, safe bed mobility/transfers/gait, fall risk, vitals    Mobility Training, taught by Rebecca Bach PT at 5/7/2024 12:24 PM.  Learner: Patient  Readiness: Acceptance  Method: Explanation, Demonstration, Teach-back  Response: Needs Reinforcement  Comment: reoriernted, PT purpose/POC, DC recs, safe bed mobility/transfers/gait, fall risk, vitals    Education Comments  No comments found.

## 2024-05-08 LAB
ALBUMIN SERPL BCP-MCNC: 2.7 G/DL (ref 3.4–5)
ANION GAP SERPL CALC-SCNC: 11 MMOL/L (ref 10–20)
BASOPHILS # BLD MANUAL: 0.03 X10*3/UL (ref 0–0.1)
BASOPHILS NFR BLD MANUAL: 0.9 %
BUN SERPL-MCNC: 43 MG/DL (ref 6–23)
CALCIUM SERPL-MCNC: 9.1 MG/DL (ref 8.6–10.6)
CHLORIDE SERPL-SCNC: 103 MMOL/L (ref 98–107)
CO2 SERPL-SCNC: 25 MMOL/L (ref 21–32)
CREAT SERPL-MCNC: 1.06 MG/DL (ref 0.5–1.05)
EGFRCR SERPLBLD CKD-EPI 2021: 56 ML/MIN/1.73M*2
EOSINOPHIL # BLD MANUAL: 0 X10*3/UL (ref 0–0.4)
EOSINOPHIL NFR BLD MANUAL: 0 %
ERYTHROCYTE [DISTWIDTH] IN BLOOD BY AUTOMATED COUNT: 14 % (ref 11.5–14.5)
GLUCOSE BLD MANUAL STRIP-MCNC: 215 MG/DL (ref 74–99)
GLUCOSE BLD MANUAL STRIP-MCNC: 265 MG/DL (ref 74–99)
GLUCOSE BLD MANUAL STRIP-MCNC: 266 MG/DL (ref 74–99)
GLUCOSE BLD MANUAL STRIP-MCNC: 291 MG/DL (ref 74–99)
GLUCOSE BLD MANUAL STRIP-MCNC: 320 MG/DL (ref 74–99)
GLUCOSE SERPL-MCNC: 327 MG/DL (ref 74–99)
HCT VFR BLD AUTO: 34.8 % (ref 36–46)
HGB BLD-MCNC: 11.7 G/DL (ref 12–16)
HLA RESULTS: NORMAL
HLA-A+B+C AB NFR SER: NORMAL %
HLA-DP+DQ+DR AB NFR SER: NORMAL %
IMM GRANULOCYTES # BLD AUTO: 0.06 X10*3/UL (ref 0–0.5)
IMM GRANULOCYTES NFR BLD AUTO: 1.6 % (ref 0–0.9)
LYMPHOCYTES # BLD MANUAL: 0.13 X10*3/UL (ref 0.8–3)
LYMPHOCYTES NFR BLD MANUAL: 3.5 %
MAGNESIUM SERPL-MCNC: 2.13 MG/DL (ref 1.6–2.4)
MCH RBC QN AUTO: 26.6 PG (ref 26–34)
MCHC RBC AUTO-ENTMCNC: 33.6 G/DL (ref 32–36)
MCV RBC AUTO: 79 FL (ref 80–100)
MONOCYTES # BLD MANUAL: 0.33 X10*3/UL (ref 0.05–0.8)
MONOCYTES NFR BLD MANUAL: 8.8 %
NEUTROPHILS # BLD MANUAL: 3.22 X10*3/UL (ref 1.6–5.5)
NEUTS BAND # BLD MANUAL: 0.1 X10*3/UL (ref 0–0.5)
NEUTS BAND NFR BLD MANUAL: 2.6 %
NEUTS SEG # BLD MANUAL: 3.12 X10*3/UL (ref 1.6–5)
NEUTS SEG NFR BLD MANUAL: 84.2 %
NRBC BLD-RTO: 0 /100 WBCS (ref 0–0)
PHOSPHATE SERPL-MCNC: 2.4 MG/DL (ref 2.5–4.9)
PLATELET # BLD AUTO: 248 X10*3/UL (ref 150–450)
PLATELET CLUMP BLD QL SMEAR: PRESENT
POTASSIUM SERPL-SCNC: 3.8 MMOL/L (ref 3.5–5.3)
RBC # BLD AUTO: 4.4 X10*6/UL (ref 4–5.2)
RBC MORPH BLD: ABNORMAL
SODIUM SERPL-SCNC: 135 MMOL/L (ref 136–145)
TACROLIMUS BLD-MCNC: 14 NG/ML
TOTAL CELLS COUNTED BLD: 114
WBC # BLD AUTO: 3.7 X10*3/UL (ref 4.4–11.3)

## 2024-05-08 PROCEDURE — 99233 SBSQ HOSP IP/OBS HIGH 50: CPT | Performed by: INTERNAL MEDICINE

## 2024-05-08 PROCEDURE — 97110 THERAPEUTIC EXERCISES: CPT | Mod: GP

## 2024-05-08 PROCEDURE — 2500000001 HC RX 250 WO HCPCS SELF ADMINISTERED DRUGS (ALT 637 FOR MEDICARE OP): Performed by: STUDENT IN AN ORGANIZED HEALTH CARE EDUCATION/TRAINING PROGRAM

## 2024-05-08 PROCEDURE — 99233 SBSQ HOSP IP/OBS HIGH 50: CPT

## 2024-05-08 PROCEDURE — 85027 COMPLETE CBC AUTOMATED: CPT

## 2024-05-08 PROCEDURE — 36415 COLL VENOUS BLD VENIPUNCTURE: CPT

## 2024-05-08 PROCEDURE — 80197 ASSAY OF TACROLIMUS: CPT

## 2024-05-08 PROCEDURE — 85007 BL SMEAR W/DIFF WBC COUNT: CPT

## 2024-05-08 PROCEDURE — 80069 RENAL FUNCTION PANEL: CPT

## 2024-05-08 PROCEDURE — 2500000004 HC RX 250 GENERAL PHARMACY W/ HCPCS (ALT 636 FOR OP/ED): Performed by: STUDENT IN AN ORGANIZED HEALTH CARE EDUCATION/TRAINING PROGRAM

## 2024-05-08 PROCEDURE — 2500000002 HC RX 250 W HCPCS SELF ADMINISTERED DRUGS (ALT 637 FOR MEDICARE OP, ALT 636 FOR OP/ED)

## 2024-05-08 PROCEDURE — 97116 GAIT TRAINING THERAPY: CPT | Mod: GP

## 2024-05-08 PROCEDURE — 1210000001 HC SEMI-PRIVATE ROOM DAILY

## 2024-05-08 PROCEDURE — 83735 ASSAY OF MAGNESIUM: CPT

## 2024-05-08 PROCEDURE — 2500000004 HC RX 250 GENERAL PHARMACY W/ HCPCS (ALT 636 FOR OP/ED)

## 2024-05-08 PROCEDURE — 2500000002 HC RX 250 W HCPCS SELF ADMINISTERED DRUGS (ALT 637 FOR MEDICARE OP, ALT 636 FOR OP/ED): Performed by: STUDENT IN AN ORGANIZED HEALTH CARE EDUCATION/TRAINING PROGRAM

## 2024-05-08 PROCEDURE — 82947 ASSAY GLUCOSE BLOOD QUANT: CPT

## 2024-05-08 RX ORDER — INSULIN GLARGINE 100 [IU]/ML
40 INJECTION, SOLUTION SUBCUTANEOUS NIGHTLY
Status: DISCONTINUED | OUTPATIENT
Start: 2024-05-08 | End: 2024-05-09 | Stop reason: HOSPADM

## 2024-05-08 RX ORDER — POLYETHYLENE GLYCOL 3350 17 G/17G
17 POWDER, FOR SOLUTION ORAL DAILY PRN
Status: DISCONTINUED | OUTPATIENT
Start: 2024-05-08 | End: 2024-05-09 | Stop reason: HOSPADM

## 2024-05-08 RX ORDER — CEPHALEXIN 500 MG/1
500 CAPSULE ORAL EVERY 6 HOURS SCHEDULED
Status: DISCONTINUED | OUTPATIENT
Start: 2024-05-09 | End: 2024-05-09 | Stop reason: HOSPADM

## 2024-05-08 RX ORDER — INSULIN LISPRO 100 [IU]/ML
5 INJECTION, SOLUTION INTRAVENOUS; SUBCUTANEOUS
Status: DISCONTINUED | OUTPATIENT
Start: 2024-05-08 | End: 2024-05-09 | Stop reason: HOSPADM

## 2024-05-08 RX ORDER — SODIUM,POTASSIUM PHOSPHATES 280-250MG
1 POWDER IN PACKET (EA) ORAL 4 TIMES DAILY
Status: COMPLETED | OUTPATIENT
Start: 2024-05-08 | End: 2024-05-08

## 2024-05-08 RX ADMIN — HYDRALAZINE HYDROCHLORIDE 100 MG: 25 TABLET ORAL at 09:33

## 2024-05-08 RX ADMIN — HEPARIN SODIUM 5000 UNITS: 5000 INJECTION INTRAVENOUS; SUBCUTANEOUS at 10:58

## 2024-05-08 RX ADMIN — MYCOPHENOLATE MOFETIL 250 MG: 500 TABLET, FILM COATED ORAL at 20:47

## 2024-05-08 RX ADMIN — HEPARIN SODIUM 5000 UNITS: 5000 INJECTION INTRAVENOUS; SUBCUTANEOUS at 20:47

## 2024-05-08 RX ADMIN — HYDRALAZINE HYDROCHLORIDE 100 MG: 25 TABLET ORAL at 15:41

## 2024-05-08 RX ADMIN — CARVEDILOL 50 MG: 12.5 TABLET, FILM COATED ORAL at 09:33

## 2024-05-08 RX ADMIN — CARVEDILOL 50 MG: 12.5 TABLET, FILM COATED ORAL at 20:47

## 2024-05-08 RX ADMIN — INSULIN LISPRO 5 UNITS: 100 INJECTION, SOLUTION INTRAVENOUS; SUBCUTANEOUS at 17:31

## 2024-05-08 RX ADMIN — PREDNISONE 5 MG: 5 TABLET ORAL at 09:34

## 2024-05-08 RX ADMIN — POTASSIUM & SODIUM PHOSPHATES POWDER PACK 280-160-250 MG 1 PACKET: 280-160-250 PACK at 20:47

## 2024-05-08 RX ADMIN — INSULIN LISPRO 4 UNITS: 100 INJECTION, SOLUTION INTRAVENOUS; SUBCUTANEOUS at 09:36

## 2024-05-08 RX ADMIN — CEFTRIAXONE SODIUM 1 G: 1 INJECTION, SOLUTION INTRAVENOUS at 09:34

## 2024-05-08 RX ADMIN — MYCOPHENOLATE MOFETIL 250 MG: 500 TABLET, FILM COATED ORAL at 09:34

## 2024-05-08 RX ADMIN — TACROLIMUS 0.5 MG: 0.5 CAPSULE ORAL at 17:30

## 2024-05-08 RX ADMIN — HEPARIN SODIUM 5000 UNITS: 5000 INJECTION INTRAVENOUS; SUBCUTANEOUS at 03:02

## 2024-05-08 RX ADMIN — ATORVASTATIN CALCIUM 10 MG: 20 TABLET, FILM COATED ORAL at 20:47

## 2024-05-08 RX ADMIN — INSULIN LISPRO 3 UNITS: 100 INJECTION, SOLUTION INTRAVENOUS; SUBCUTANEOUS at 09:36

## 2024-05-08 RX ADMIN — Medication 2000 UNITS: at 09:34

## 2024-05-08 RX ADMIN — TACROLIMUS 0.5 MG: 0.5 CAPSULE ORAL at 10:58

## 2024-05-08 RX ADMIN — INSULIN LISPRO 3 UNITS: 100 INJECTION, SOLUTION INTRAVENOUS; SUBCUTANEOUS at 13:43

## 2024-05-08 RX ADMIN — INSULIN LISPRO 5 UNITS: 100 INJECTION, SOLUTION INTRAVENOUS; SUBCUTANEOUS at 13:42

## 2024-05-08 RX ADMIN — POTASSIUM & SODIUM PHOSPHATES POWDER PACK 280-160-250 MG 1 PACKET: 280-160-250 PACK at 17:28

## 2024-05-08 RX ADMIN — HYDRALAZINE HYDROCHLORIDE 100 MG: 25 TABLET ORAL at 20:47

## 2024-05-08 RX ADMIN — INSULIN GLARGINE 40 UNITS: 100 INJECTION, SOLUTION SUBCUTANEOUS at 20:50

## 2024-05-08 RX ADMIN — INSULIN LISPRO 3 UNITS: 100 INJECTION, SOLUTION INTRAVENOUS; SUBCUTANEOUS at 17:31

## 2024-05-08 ASSESSMENT — COGNITIVE AND FUNCTIONAL STATUS - GENERAL
HELP NEEDED FOR BATHING: A LOT
TURNING FROM BACK TO SIDE WHILE IN FLAT BAD: A LITTLE
CLIMB 3 TO 5 STEPS WITH RAILING: TOTAL
MOVING TO AND FROM BED TO CHAIR: A LOT
TOILETING: A LOT
MOBILITY SCORE: 14
DRESSING REGULAR LOWER BODY CLOTHING: A LOT
DAILY ACTIVITIY SCORE: 13
MOBILITY SCORE: 18
CLIMB 3 TO 5 STEPS WITH RAILING: A LOT
EATING MEALS: A LITTLE
WALKING IN HOSPITAL ROOM: A LITTLE
PERSONAL GROOMING: A LOT
TURNING FROM BACK TO SIDE WHILE IN FLAT BAD: A LOT
STANDING UP FROM CHAIR USING ARMS: A LITTLE
MOVING FROM LYING ON BACK TO SITTING ON SIDE OF FLAT BED WITH BEDRAILS: A LITTLE
WALKING IN HOSPITAL ROOM: A LITTLE
MOVING TO AND FROM BED TO CHAIR: A LITTLE
DRESSING REGULAR UPPER BODY CLOTHING: A LOT
STANDING UP FROM CHAIR USING ARMS: A LITTLE

## 2024-05-08 ASSESSMENT — PAIN - FUNCTIONAL ASSESSMENT
PAIN_FUNCTIONAL_ASSESSMENT: 0-10

## 2024-05-08 ASSESSMENT — PAIN SCALES - GENERAL
PAINLEVEL_OUTOF10: 0 - NO PAIN

## 2024-05-08 ASSESSMENT — ACTIVITIES OF DAILY LIVING (ADL): LACK_OF_TRANSPORTATION: NO

## 2024-05-08 NOTE — CARE PLAN
The patient's goals for the shift include  no fall or injury    The clinical goals for the shift include pt will not fall during shift    Problem: Skin  Goal: Decreased wound size/increased tissue granulation at next dressing change  Outcome: Progressing  Flowsheets (Taken 5/8/2024 1201)  Decreased wound size/increased tissue granulation at next dressing change: Promote sleep for wound healing  Goal: Participates in plan/prevention/treatment measures  Outcome: Progressing  Flowsheets (Taken 5/8/2024 1201)  Participates in plan/prevention/treatment measures: Elevate heels  Goal: Prevent/manage excess moisture  Outcome: Progressing  Flowsheets (Taken 5/8/2024 1201)  Prevent/manage excess moisture:   Follow provider orders for dressing changes   Cleanse incontinence/protect with barrier cream  Goal: Prevent/minimize sheer/friction injuries  Outcome: Progressing  Flowsheets (Taken 5/8/2024 1201)  Prevent/minimize sheer/friction injuries: HOB 30 degrees or less  Goal: Promote/optimize nutrition  Outcome: Progressing  Flowsheets (Taken 5/8/2024 1201)  Promote/optimize nutrition: Offer water/supplements/favorite foods  Goal: Promote skin healing  Outcome: Progressing  Flowsheets (Taken 5/8/2024 1201)  Promote skin healing: Turn/reposition every 2 hours/use positioning/transfer devices     Problem: Diabetes  Goal: Achieve decreasing blood glucose levels by end of shift  Outcome: Progressing  Goal: Increase stability of blood glucose readings by end of shift  Outcome: Progressing  Goal: Decrease in ketones present in urine by end of shift  Outcome: Progressing  Goal: Maintain electrolyte levels within acceptable range throughout shift  Outcome: Progressing  Goal: Maintain glucose levels >70mg/dl to <250mg/dl throughout shift  Outcome: Progressing  Goal: No changes in neurological exam by end of shift  Outcome: Progressing  Goal: Learn about and adhere to nutrition recommendations by end of shift  Outcome: Progressing  Goal:  Vital signs within normal range for age by end of shift  Outcome: Progressing  Goal: Increase self care and/or family involovement by end of shift  Outcome: Progressing  Goal: Receive DSME education by end of shift  Outcome: Progressing

## 2024-05-08 NOTE — DOCUMENTATION CLARIFICATION NOTE
PATIENT:               [facility] LON[id number]  MRN:                       [id number]  :                       1950  ADMIT DATE:       2024 8:10 PM  DISCH DATE:  RESPONDING PROVIDER #:        [id number]          PROVIDER RESPONSE TEXT:    Type 2 diabetes without DKA    CDI QUERY TEXT:    Clarification            Instruction:    Based on your assessment of the patient and the clinical information, please provide the requested documentation by clicking on the appropriate radio button and enter any additional information if prompted.    Question: Based on your medical judgment, can you please clarify which of these conditions is the most clinically supported        When answering this query, please exercise your independent professional judgment. The fact that a question is being asked, does not imply that any particular answer is desired or expected.    The patient's clinical indicators include:  Clinical Information: There is conflicting documentation in the medical record which requires clarification.    -The diagnosis of not DKA was documented on  ED  -The diagnosis of  mild DKA was documented on  H and P    -The diagnosis of Type 1 diabetes and type 2 diabetes were documented in ED and H&P    Clinical Information:  73-year-old female with history of diabetes, kidney transplant presents with hyperglycemia, continuous glucose monitor broke a few days ago, she has not taken insulin since then, comes in feeling fatigued, clinically looks dehydrated.  VS:  37   104   16   161/72   96 percent on RA, 88 percent while asleep, placed on O2 ?    Clinical Indicators:  ED:  ? Patient not in DKA with a pH of 7.32 and an anion gap of 15 and bicarb of 21.6.  HHS is also unlikely with a glucose below 600 and no altered mental status.    :  ED, H and P:  ?  History of diabetes mellitus  PMH:   Type 1 diabetes mellitus with hypoglycemia with coma (Multi)  Type 1 diabetes mellitus with  hypoglycemic coma  Type 2 diabetes mellitus without complications (Multi)  Diabetes mellitus type 2 without retinopathy ?      5/5 H&P:  ? Uncontrolled diabetes with mild diabetic ketoacidosis.   on admission  lactate 1.6     HgbA1C  6.8 on admission  Labs on presentation notable for hyperglycemia (364) with borderline anion gap c/f DKA.  Delirium: This is likely metabolic encephalopathy related to her dehydration, DKA and urinary tract infection.?    5/6 DPN:  ? Labs notable for hyperglycemia, mild DKA, SHWETA. ?  5/7 DPN:  ? DKA - resolved ?    Treatment:  IV LR, mag sulfate,insulin Humulin x1,  insulin glargine (Lantus) injection 20 Units nightly, insulin lispro (HumaLOG) injection 0-5 Units with meals, IV CTX for UTI with enteric bacilli  Risk Factors:  noncompliant with insulin and diet  Options provided:  -- Type 1diabetes with DKA  -- Type 2 diabetes with DKA  -- Type 1 diabetes without DKA  -- Type 2 diabetes without DKA  -- Other - I will add my own diagnosis  -- Refer to Clinical Documentation Reviewer    Query created by: Nani Muse on 5/7/2024 12:11 PM      Electronically signed by:  JH PEÑA MD 5/8/2024 9:06 AM

## 2024-05-08 NOTE — PROGRESS NOTES
Physical Therapy    Physical Therapy Treatment    Patient Name: Jacklyn Villa  MRN: [id number]  Today's Date: 5/8/2024     IP Time Calculation  Start Time: 1103  Stop Time: 1131  Time Calculation (min): 28 min    Assessment/Plan   PT Assessment  Evaluation/Treatment Tolerance: Patient tolerated treatment well  Medical Staff Made Aware: Yes  End of Session Communication: Bedside nurse, Physician, PCT/NA/CTA  Assessment Comment: Pt with improved upright tolerance and endurance today. Pt able top amb 75 and 120ft with CGA and WW. DC rec will be changed to low with family support  End of Session Patient Position: Up in chair, Alarm on (CB in reach)  PT Plan  Treatment/Interventions: Bed mobility, Transfer training, Gait training, Stair training, Balance training, Neuromuscular re-education, Endurance training, Therapeutic exercise, Therapeutic activity, Home exercise program, Postural re-education, Strengthening  PT Plan: Skilled PT  PT Frequency: 3 times per week  PT Discharge Recommendations: Low intensity level of continued care (pending stairs)  Equipment Recommended upon Discharge: Wheeled walker  PT Recommended Transfer Status: Assist x1, Assistive device (WW)  PT - OK to Discharge: Yes (PT eval completed and DC recs made)  RN cleared prior to session    General Visit Information:   PT  Visit  PT Received On: 05/08/24  Response to Previous Treatment: Patient with no complaints from previous session.  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), CMV, T2DM, HTN, cataracts  Family/Caregiver Present: No  Prior to Session Communication: Bedside nurse  Patient Position Received: Bed, 3 rail up, Alarm off, not on at start of session  Preferred  Learning Style: verbal, kinesthetic  General Comment: Pt pleasant and agreebale to therapy    Subjective   Precautions:  Precautions  Medical Precautions: Fall precautions  Vital Signs:  Vital Signs  SpO2: 96 % (post amb)    Objective   Pain:  Pain Assessment  Pain Assessment: 0-10  Pain Score: 0 - No pain  Cognition:  Cognition  Orientation Level: Oriented X4  Following Commands: Follows multistep commands without difficulty  Postural Control:  Postural Control  Postural Control: Within Functional Limits    Activity Tolerance:  Activity Tolerance  Endurance: Tolerates 30 min exercise with multiple rests  Treatments:  Therapeutic Exercise  Therapeutic Exercise Performed: Yes  Therapeutic Exercise Activity 1: sititng active LAQ, AP, marches x 15 B    Therapeutic Activity  Therapeutic Activity Performed: Yes  Therapeutic Activity 1: static/dyanmic sitting EOB x 10 min  Therapeutic Activity 2: static standing with close supervision and ww x 3 min    Bed Mobility  Bed Mobility: Yes  Bed Mobility 1  Bed Mobility 1: Supine to sitting  Level of Assistance 1: Minimum assistance, Minimal verbal cues, Minimal tactile cues  Bed Mobility Comments 1: min vc for safety and hand placement; HOB elevated    Ambulation/Gait Training  Ambulation/Gait Training Performed: Yes  Ambulation/Gait Training 1  Surface 1: Level tile  Device 1: Rolling walker  Assistance 1: Contact guard  Quality of Gait 1: Inconsistent stride length, Decreased step length, Diminished heel strike  Comments/Distance (ft) 1: 75ft; 120ft; min vc for AD use and safety  Transfers  Transfer: Yes  Transfer 1  Transfer From 1: Sit to, Stand to  Transfer to 1: Sit, Stand  Technique 1: Sit to stand, Stand to sit  Transfer Device 1: Walker  Transfer Level of Assistance 1: Minimum assistance, Contact guard  Trials/Comments 1: 2 trials; 1st with min A and second with CGA' min vc for hand placement    Outcome Measures:  [facility] Basic Mobility  Turning from your back to your  side while in a flat bed without using bedrails: None  Moving from lying on your back to sitting on the side of a flat bed without using bedrails: A little  Moving to and from bed to chair (including a wheelchair): A little  Standing up from a chair using your arms (e.g. wheelchair or bedside chair): A little  To walk in hospital room: A little  Climbing 3-5 steps with railing: A lot  Basic Mobility - Total Score: 18    Education Documentation  Precautions, taught by Melony Aldridge PT at 5/8/2024 12:50 PM.  Learner: Patient  Readiness: Acceptance  Method: Explanation, Demonstration  Response: Verbalizes Understanding, Needs Reinforcement    Body Mechanics, taught by Melony Aldridge PT at 5/8/2024 12:50 PM.  Learner: Patient  Readiness: Acceptance  Method: Explanation, Demonstration  Response: Verbalizes Understanding, Needs Reinforcement    Home Exercise Program, taught by Melony Aldridge PT at 5/8/2024 12:50 PM.  Learner: Patient  Readiness: Acceptance  Method: Explanation, Demonstration  Response: Verbalizes Understanding, Needs Reinforcement    Mobility Training, taught by Melony Aldridge PT at 5/8/2024 12:50 PM.  Learner: Patient  Readiness: Acceptance  Method: Explanation, Demonstration  Response: Verbalizes Understanding, Needs Reinforcement    Education Comments  No comments found.      Encounter Problems       Encounter Problems (Active)       General Goals       oriented x 4 and follows 100% of 2 step commands without additional cues (Progressing)       Start:  05/07/24    Expected End:  05/21/24            supine to/from sit (HOB flat, no rail) with supervision/cues (Progressing)       Start:  05/07/24    Expected End:  05/21/24            sit to/from stand, bed to/from chair with WW and SBA, no LOB, stable vitals (Progressing)       Start:  05/07/24    Expected End:  05/21/24               Mobility       ambulate 130' with WW and SBA no LOB, stable vitals (Progressing)       Start:  05/07/24     Expected End:  05/21/24            up/down 5 steps with 1 rail with SBA (Progressing)       Start:  05/07/24    Expected End:  05/21/24            5 Times Sit to Stand with supervision, no LOB <15 seconds (Progressing)       Start:  05/07/24    Expected End:  05/21/24

## 2024-05-08 NOTE — PROGRESS NOTES
Jacklyn Villa is a 73 y.o. female on day 3 of admission presenting with Hyperglycemia.      Subjective   5/8: Pt seen sitting in chair. No acute complaints. Mentation seems good, though looks tired.         Objective          Vitals 24HR  Heart Rate:  [86-96]   Temp:  [35.5 °C (95.9 °F)-36.5 °C (97.7 °F)]   Resp:  [15-18]   BP: (144-173)/(59-78)   SpO2:  [91 %-100 %]     Intake/Output last 3 Shifts:    Intake/Output Summary (Last 24 hours) at 5/8/2024 1311  Last data filed at 5/8/2024 0100  Gross per 24 hour   Intake --   Output 500 ml   Net -500 ml       Physical Exam  General appearance: AAOx3. Looks tired  Eyes: non-icteric  Skin: no apparent rash  Heart: rhythm regular.  Lungs: CTA bilat.  np wheezing/crackles, on room air  Abdomen: soft, nt/nd  Extremities: no edema bilat  : no Singh  Neuro: No FND, follows commands  Dialysis ACCESS: no      Relevant Results    Current Facility-Administered Medications:     aspirin EC tablet 325 mg, 325 mg, oral, Daily, Miriam Garcia MD    atorvastatin (Lipitor) tablet 10 mg, 10 mg, oral, Nightly, Solange Flores MD, 10 mg at 05/07/24 2143    carvedilol (Coreg) tablet 50 mg, 50 mg, oral, BID, Solange Flores MD, 50 mg at 05/08/24 0933    [START ON 5/9/2024] cephalexin (Keflex) capsule 500 mg, 500 mg, oral, q6h JENNIFER, Inocencio Morris MD    cholecalciferol (Vitamin D-3) tablet 2,000 Units, 2,000 Units, oral, Daily, Solange Flores MD, 2,000 Units at 05/08/24 0934    dextrose 50 % injection 12.5 g, 12.5 g, intravenous, q15 min PRN, Solange Flores MD    dextrose 50 % injection 25 g, 25 g, intravenous, q15 min PRN, Solange Flores MD    glucagon (Glucagen) injection 1 mg, 1 mg, intramuscular, q15 min PRN, Solange Flores MD    glucagon (Glucagen) injection 1 mg, 1 mg, intramuscular, q15 min PRN, Solange Flores MD    heparin (porcine) injection 5,000 Units, 5,000 Units, subcutaneous, q8h, Miriam Garcia MD, 5,000 Units at 05/08/24 1058    hydrALAZINE (Apresoline) tablet 100 mg, 100  mg, oral, TID, Inocencio Morris MD, 100 mg at 05/08/24 0933    insulin glargine (Lantus) injection 40 Units, 40 Units, subcutaneous, Nightly, Inocencio Morris MD    insulin lispro (HumaLOG) injection 0-5 Units, 0-5 Units, subcutaneous, TID with meals, Solange Flores MD, 4 Units at 05/08/24 0936    insulin lispro (HumaLOG) injection 5 Units, 5 Units, subcutaneous, TID with meals, Miriam Garcia MD    mycophenolate (Cellcept) tablet 250 mg, 250 mg, oral, BID, Inocencio Morris MD, 250 mg at 05/08/24 0934    polyethylene glycol (Glycolax, Miralax) packet 17 g, 17 g, oral, Daily PRN, Miriam Garcia MD    potassium phosphate (monobasic) (K-Phos) tablet 500 mg, 500 mg, oral, 4x daily, Inocencio Morris MD    predniSONE (Deltasone) tablet 5 mg, 5 mg, oral, Daily, Solange Flores MD, 5 mg at 05/08/24 0934    tacrolimus (Prograf) capsule 0.5 mg, 0.5 mg, oral, q12h JENNIFER, Inocencio Morris MD, 0.5 mg at 05/08/24 1058    Results for orders placed or performed during the hospital encounter of 05/04/24 (from the past 24 hour(s))   POCT GLUCOSE   Result Value Ref Range    POCT Glucose 339 (H) 74 - 99 mg/dL   POCT GLUCOSE   Result Value Ref Range    POCT Glucose 363 (H) 74 - 99 mg/dL   POCT GLUCOSE   Result Value Ref Range    POCT Glucose 381 (H) 74 - 99 mg/dL   POCT GLUCOSE   Result Value Ref Range    POCT Glucose 275 (H) 74 - 99 mg/dL   POCT GLUCOSE   Result Value Ref Range    POCT Glucose 320 (H) 74 - 99 mg/dL   CBC and Auto Differential   Result Value Ref Range    WBC 3.7 (L) 4.4 - 11.3 x10*3/uL    nRBC 0.0 0.0 - 0.0 /100 WBCs    RBC 4.40 4.00 - 5.20 x10*6/uL    Hemoglobin 11.7 (L) 12.0 - 16.0 g/dL    Hematocrit 34.8 (L) 36.0 - 46.0 %    MCV 79 (L) 80 - 100 fL    MCH 26.6 26.0 - 34.0 pg    MCHC 33.6 32.0 - 36.0 g/dL    RDW 14.0 11.5 - 14.5 %    Platelets 248 150 - 450 x10*3/uL    Immature Granulocytes %, Automated 1.6 (H) 0.0 - 0.9 %    Immature Granulocytes Absolute, Automated 0.06 0.00 - 0.50 x10*3/uL   Renal Function Panel   Result Value Ref Range     Glucose 327 (H) 74 - 99 mg/dL    Sodium 135 (L) 136 - 145 mmol/L    Potassium 3.8 3.5 - 5.3 mmol/L    Chloride 103 98 - 107 mmol/L    Bicarbonate 25 21 - 32 mmol/L    Anion Gap 11 10 - 20 mmol/L    Urea Nitrogen 43 (H) 6 - 23 mg/dL    Creatinine 1.06 (H) 0.50 - 1.05 mg/dL    eGFR 56 (L) >60 mL/min/1.73m*2    Calcium 9.1 8.6 - 10.6 mg/dL    Phosphorus 2.4 (L) 2.5 - 4.9 mg/dL    Albumin 2.7 (L) 3.4 - 5.0 g/dL   Magnesium   Result Value Ref Range    Magnesium 2.13 1.60 - 2.40 mg/dL   Manual Differential   Result Value Ref Range    Neutrophils %, Manual 84.2 40.0 - 80.0 %    Bands %, Manual 2.6 0.0 - 5.0 %    Lymphocytes %, Manual 3.5 13.0 - 44.0 %    Monocytes %, Manual 8.8 2.0 - 10.0 %    Eosinophils %, Manual 0.0 0.0 - 6.0 %    Basophils %, Manual 0.9 0.0 - 2.0 %    Seg Neutrophils Absolute, Manual 3.12 1.60 - 5.00 x10*3/uL    Bands Absolute, Manual 0.10 0.00 - 0.50 x10*3/uL    Lymphocytes Absolute, Manual 0.13 (L) 0.80 - 3.00 x10*3/uL    Monocytes Absolute, Manual 0.33 0.05 - 0.80 x10*3/uL    Eosinophils Absolute, Manual 0.00 0.00 - 0.40 x10*3/uL    Basophils Absolute, Manual 0.03 0.00 - 0.10 x10*3/uL    Total Cells Counted 114     Neutrophils Absolute, Manual 3.22 1.60 - 5.50 x10*3/uL    RBC Morphology No significant RBC morphology present     Clumped Platelets Present    POCT GLUCOSE   Result Value Ref Range    POCT Glucose 291 (H) 74 - 99 mg/dL        Assessment and Plan  73 y.o. 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 Rivera, KDPI 78%, PRA 96%. HCV -/ R -, HBcAb D+/R HBsAb+ on Vemlidy,CMV D+/R+, EBV D+ / R-). Left donor kidney, transplanted to patient right pelvis. H/o DGF post operatively needing dialysis once due to hyperkalemia with eventual renal recovery who presentd on 5/4/2024 for lethargy, generalized weakness, loss of appetite, and diarrhea. Reports skipping all meds for several days. Labs notable for hyperglycemia, mild DKA, SHWETA.      Allograft  function: s/p DDKT 8/2022  SHWETA Etiology: volume depletion, UTI  - Cr trend 1.1 - 1.0  - baseline Cr 0.9-1.   - Electrolytes: K and HCO3 WNL  - nonoliguric - 0.5L in 24h  - UA with +ve LE, mod pyuria concerning for UTI. pls get txp renal US.    - urine lytes with Na 24, Urine Cr 66  - Bps above goal. 170s/60s. No hypervolemia on exam.   - Transplant Renal US - negative for hydronephrosis, resistive indices WNL  - UPC 1.5g  - DSA/HLA antibody panel from 5/5  - renal function stable    Immunosuppression:  - home: tac 1.5mg q12h  - tacrolimus goal 5-8; check tac level 30min-1h before AM dose; ensure dosed 12 h apart  - FK level 5/5: <2 (h/o skipping meds for several days)  - FK level 5/6: 17 but not true trough  - FK level 5/7: 15.6 - true trough  - continue  mg bid and pred 5mg/d.  - continue tacrolimus 0.5mg BID for now, follow up today's trough to see if adjustment needs to be made    UTI Klebsiella  - Urine cx 5/4 with klebsiella pneumonia  - Was on ceftriaxone. 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 Pt has been on treatment dose Valcyte several months. Last CMV PCR neg 4/8/24. Rpt CMV PCR on 5/5 is negative.     Anemia  - Hb 11.7, acceptable.     Mineral Disease of Bone  - Ca, Phos acceptable. Monitor.     D/w Dr. Micheline Sue MD  Nephrology Fellow PGY 5  Contact via secure chat

## 2024-05-08 NOTE — PROGRESS NOTES
"Jacklyn Villa is a 73 y.o. female on day 3 of admission presenting with Hyperglycemia.    Subjective   NAEO. Patient endorsed wanting to go home after hospitalization and would prefer not to go to rehab. Was willing to work with PT today. Denies any CP, SOB, N/V, abdominal pain. States that she had 3 soft BM yesterday. Currently on room air.    Objective   Physical Exam  Constitutional:       General: She is not in acute distress.     Appearance: Normal appearance. She is not ill-appearing.   HENT:      Head: Normocephalic and atraumatic.   Eyes:      Extraocular Movements: Extraocular movements intact.      Pupils: Pupils are equal, round, and reactive to light.   Cardiovascular:      Rate and Rhythm: Normal rate.      Heart sounds: Murmur heard.      Comments: Systolic murmur most prominent in RUSB  Pulmonary:      Effort: Pulmonary effort is normal. No respiratory distress.      Breath sounds: Normal breath sounds.   Abdominal:      General: Abdomen is flat. Bowel sounds are normal. There is no distension.      Palpations: Abdomen is soft.      Tenderness: There is no abdominal tenderness. There is no guarding.   Musculoskeletal:         General: No swelling or tenderness.   Skin:     General: Skin is warm and dry.   Neurological:      General: No focal deficit present.      Mental Status: She is alert and oriented to person, place, and time.      Cranial Nerves: No cranial nerve deficit.       Last Recorded Vitals  Blood pressure 170/67, pulse 88, temperature 36.2 °C (97.2 °F), resp. rate 17, height 1.651 m (5' 5\"), weight 95.3 kg (210 lb), SpO2 96%.  Intake/Output last 3 Shifts:  I/O last 3 completed shifts:  In: 1200 (12.6 mL/kg) [I.V.:1050 (11 mL/kg); IV Piggyback:150]  Out: 1050 (11 mL/kg) [Urine:1050 (0.3 mL/kg/hr)]  Weight: 95.3 kg     Relevant Results  Scheduled medications  [Held by provider] aspirin, 325 mg, oral, Daily  atorvastatin, 10 mg, oral, Nightly  carvedilol, 50 mg, oral, BID  cefTRIAXone, 1 " g, intravenous, q24h  cholecalciferol, 2,000 Units, oral, Daily  heparin (porcine), 5,000 Units, subcutaneous, q8h  hydrALAZINE, 100 mg, oral, TID  insulin glargine, 30 Units, subcutaneous, Nightly  insulin lispro, 0-5 Units, subcutaneous, TID with meals  insulin lispro, 3 Units, subcutaneous, TID with meals  mycophenolate, 250 mg, oral, BID  polyethylene glycol, 17 g, oral, Daily  predniSONE, 5 mg, oral, Daily  tacrolimus, 0.5 mg, oral, q12h JENNIFER      Continuous medications       PRN medications  PRN medications: dextrose, dextrose, glucagon, glucagon    Results for orders placed or performed during the hospital encounter of 05/04/24 (from the past 24 hour(s))   CBC and Auto Differential   Result Value Ref Range    WBC 4.8 4.4 - 11.3 x10*3/uL    nRBC 0.0 0.0 - 0.0 /100 WBCs    RBC 4.33 4.00 - 5.20 x10*6/uL    Hemoglobin 12.1 12.0 - 16.0 g/dL    Hematocrit 34.8 (L) 36.0 - 46.0 %    MCV 80 80 - 100 fL    MCH 27.9 26.0 - 34.0 pg    MCHC 34.8 32.0 - 36.0 g/dL    RDW 13.9 11.5 - 14.5 %    Platelets 161 150 - 450 x10*3/uL    Immature Granulocytes %, Automated 0.6 0.0 - 0.9 %    Immature Granulocytes Absolute, Automated 0.03 0.00 - 0.50 x10*3/uL   Renal Function Panel   Result Value Ref Range    Glucose 286 (H) 74 - 99 mg/dL    Sodium 133 (L) 136 - 145 mmol/L    Potassium 4.3 3.5 - 5.3 mmol/L    Chloride 99 98 - 107 mmol/L    Bicarbonate 23 21 - 32 mmol/L    Anion Gap 15 10 - 20 mmol/L    Urea Nitrogen 55 (H) 6 - 23 mg/dL    Creatinine 1.13 (H) 0.50 - 1.05 mg/dL    eGFR 52 (L) >60 mL/min/1.73m*2    Calcium 9.4 8.6 - 10.6 mg/dL    Phosphorus 2.3 (L) 2.5 - 4.9 mg/dL    Albumin 2.8 (L) 3.4 - 5.0 g/dL   Magnesium   Result Value Ref Range    Magnesium 2.49 (H) 1.60 - 2.40 mg/dL   Tacrolimus level   Result Value Ref Range    Tacrolimus  15.6 (H) <=15.0 ng/mL   Manual Differential   Result Value Ref Range    Neutrophils %, Manual 72.8 40.0 - 80.0 %    Bands %, Manual 16.7 0.0 - 5.0 %    Lymphocytes %, Manual 2.6 13.0 - 44.0 %     "Monocytes %, Manual 7.0 2.0 - 10.0 %    Eosinophils %, Manual 0.0 0.0 - 6.0 %    Basophils %, Manual 0.9 0.0 - 2.0 %    Seg Neutrophils Absolute, Manual 3.49 1.60 - 5.00 x10*3/uL    Bands Absolute, Manual 0.80 (H) 0.00 - 0.50 x10*3/uL    Lymphocytes Absolute, Manual 0.12 (L) 0.80 - 3.00 x10*3/uL    Monocytes Absolute, Manual 0.34 0.05 - 0.80 x10*3/uL    Eosinophils Absolute, Manual 0.00 0.00 - 0.40 x10*3/uL    Basophils Absolute, Manual 0.04 0.00 - 0.10 x10*3/uL    Total Cells Counted 114     Neutrophils Absolute, Manual 4.29 1.60 - 5.50 x10*3/uL    RBC Morphology See Below     Polychromasia Mild     Basom Cells Few     Acanthocytes Few    POCT GLUCOSE   Result Value Ref Range    POCT Glucose 314 (H) 74 - 99 mg/dL   POCT GLUCOSE   Result Value Ref Range    POCT Glucose 299 (H) 74 - 99 mg/dL   POCT GLUCOSE   Result Value Ref Range    POCT Glucose 339 (H) 74 - 99 mg/dL   POCT GLUCOSE   Result Value Ref Range    POCT Glucose 363 (H) 74 - 99 mg/dL   POCT GLUCOSE   Result Value Ref Range    POCT Glucose 381 (H) 74 - 99 mg/dL   POCT GLUCOSE   Result Value Ref Range    POCT Glucose 275 (H) 74 - 99 mg/dL     Assessment/Plan   Principal Problem:    Hyperglycemia  72F PMHx ESRD 2/2 IDDM2 and HTN 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 Labs on presentation notable for hyperglycemia with borderline anion gap c/f DKA. UA is notable for leukocyte esterase and WBCs. At baseline, the pt is AOX4, drives, manages her own medications, and is independent for her ADLs. Per son, since moving in a week ago, she has been \"shaky\", had worsening diarrhea, and \"slower with responses\". Unclear if she has been taking her medications during that time. Given her baseline and current AMS, was started on CTX. Transplant nephrology is following, appreciate " recs. Will send CMV PCR as well given hx of CMV viremia.     Updates 5/8:  - Transplant nephrology following, appreciate recs - decreased tacrolimus to 0.5mg BID yesterday. CMV DNA PCR negative.  - Urine culture - Klebsiella pneumoniae. Will switch to keflex to complete 7 day course of antibiotics  - Restarted home  for iliac artery atherosclerosis   - Insulin: 40u glargine + 5u lispro with meals  - Restarted sqHeparin for DVT prophylaxis given improving thrombocytopenia    #DKA - resolved   #hyperglycemia   #IDDM2  ::home meds: farxiga 10 mg, ozempic 2 mg, lantus 15, lispro sliding scale   ::A1c 6.8 on 5/5   :: s/p 15u lantus + 8 lispro in ED, 10u NPH   -carb controlled diet  -hold home OHAs for now  -c/w mild ISS and qACHS glucose checks  - Increased to 40u lantus daily + 5u lispro with meals + ISS - will adjust as necessary    #AMS  #UTI  :: Per collateral from family - pt baseline aox3, independent for all ADLs, drives car, manages medications  :: Currently oriented x3 but very drowsy, slow to respond, confused  :: UA 2+ bacteria, + LE, - nitrites  :: CT head non-con: no evidence of acute hemorrhage/mass lesion/acute infarction  - Ucx - enteric bacilli   - Started on CTX (5/5- 5/7), switched to keflex 500mg q6h today for total 7 day course (5/8-5/11)     #ESRD s/p DDRT 8/2022   #CMV viremia   #SHWETA - improving  ::per transplant nephrology notes, patient should be on 500 mg MMF BID, tacrolimus 1.5 mg BID, and 5 mg prednisone  :: Cr on admission 1.68, baseline ~1 -> improving after fluids, likely pre-renal. Pt having diarrhea, frequent urination  :: s/p 3L LR, 75 ml/hr LR due to poor PO intake  -previously on vemlidy for positive HepB core ab, treatment completed   - Renal ultrasound with doppler - unremarkable, improved intrarenal arterial resistive indices   -transplant nephrology following  - FENa - pre-renal 0.3%  -Restarted on tacrolimus 0.5mg BID, prednisone 5mg, MMF 250mg BID  - CMV PCR negative  [ ]  Follow up DSA     #Thrombocytopenia - resolving  :: Platelets 104 from 216 on 4/2024  :: possibly reactive given infection  - PF4 negative     #Systolic ejection murmur   ::Cardiology notes from 2021 document grade II ESM; audible ESM heard on exam on admission so murmur is not new   -repeat TTE ordered; last TTE in 2022 suggested possible LVOT obstruction     #hypertonic hyponatremia  ::Na 127 on admission, 134 corrected for BG, serum osm 310   -s/p 3L IVF in ED   -Currently on 75cc/hr LR      #diarrhea   -check stool pathogen PCR   -CMV PCR pending   -if transplant nephrology is concerned for CMV enteritis, may require GI consultation and biopsy      #HLD  #Hx of iliac artery atherosclerosis   -c/w home lovastatin 10mg  - Restarted home ASA 325mg daily     #HTN   -resume home coreg 50 BID  -Restarted on home hydralazine 100mg TID     F: s/p 3L, 75cc/hr LR  E: Replete prn  N: diabetic  GI: deferred   DVT: SCD  Abx: CTX  Access: PIV    CODE: FULL (confirmed on admission)  NOK: sibling Gabbie [phone]      Miriam Garcia MD

## 2024-05-08 NOTE — PROGRESS NOTES
05/08/24 1600   Discharge Planning   Living Arrangements Children  (Lives with son)   Support Systems Children   Assistance Needed None   Type of Residence Private residence   Number of Stairs to Enter Residence 5   Number of Stairs Within Residence 0   Do you have animals or pets at home? No   Who is requesting discharge planning? Provider   Home or Post Acute Services In home services   Type of Home Care Services Home PT;Home OT   Patient expects to be discharged to: Home   Does the patient need discharge transport arranged? No   Financial Resource Strain   How hard is it for you to pay for the very basics like food, housing, medical care, and heating? Not hard   Housing Stability   In the last 12 months, was there a time when you were not able to pay the mortgage or rent on time? N   In the last 12 months, how many places have you lived? 1   In the last 12 months, was there a time when you did not have a steady place to sleep or slept in a shelter (including now)? N   Transportation Needs   In the past 12 months, has lack of transportation kept you from medical appointments or from getting medications? no   In the past 12 months, has lack of transportation kept you from meetings, work, or from getting things needed for daily living? No     Assessment Note:  Met with patient and Introduced myself as care coordinator and member of the Care Transitions team for discharge planning. Pt feels safe at home.   Transportation: patient said her son can pick her up when time for discharge.  Pharmacy: [facility] in [place].  DME: No  Previous home care: No  Falls: No  PCP: Joann Min  doctor  Dialysis: No    Transitional Care Coordination Progress Note:  Patient discussed during interdisciplinary rounds.   Team members present: MD and TCC  Plan per Medical/Surgical team: Working on blood sugars and upping titration.  Payor: [employer]  Discharge disposition: Pt recommended SNF, Patient refused  and wants to go home with  home care.  Potential Barriers: None  ADOD: 05/10/24

## 2024-05-09 ENCOUNTER — PHARMACY VISIT (OUTPATIENT)
Dept: PHARMACY | Facility: CLINIC | Age: 74
End: 2024-05-09
Payer: COMMERCIAL

## 2024-05-09 VITALS
WEIGHT: 210 LBS | HEIGHT: 65 IN | SYSTOLIC BLOOD PRESSURE: 168 MMHG | HEART RATE: 91 BPM | TEMPERATURE: 97.2 F | DIASTOLIC BLOOD PRESSURE: 75 MMHG | RESPIRATION RATE: 16 BRPM | OXYGEN SATURATION: 98 % | BODY MASS INDEX: 34.99 KG/M2

## 2024-05-09 LAB
ALBUMIN SERPL BCP-MCNC: 2.5 G/DL (ref 3.4–5)
ANION GAP SERPL CALC-SCNC: 14 MMOL/L (ref 10–20)
BACTERIA BLD CULT: NORMAL
BASOPHILS # BLD AUTO: 0.02 X10*3/UL (ref 0–0.1)
BASOPHILS NFR BLD AUTO: 0.5 %
BUN SERPL-MCNC: 31 MG/DL (ref 6–23)
CALCIUM SERPL-MCNC: 8.9 MG/DL (ref 8.6–10.6)
CHLORIDE SERPL-SCNC: 104 MMOL/L (ref 98–107)
CO2 SERPL-SCNC: 26 MMOL/L (ref 21–32)
CREAT SERPL-MCNC: 0.99 MG/DL (ref 0.5–1.05)
EGFRCR SERPLBLD CKD-EPI 2021: 61 ML/MIN/1.73M*2
EOSINOPHIL # BLD AUTO: 0.03 X10*3/UL (ref 0–0.4)
EOSINOPHIL NFR BLD AUTO: 0.7 %
ERYTHROCYTE [DISTWIDTH] IN BLOOD BY AUTOMATED COUNT: 14.4 % (ref 11.5–14.5)
GLUCOSE BLD MANUAL STRIP-MCNC: 135 MG/DL (ref 74–99)
GLUCOSE BLD MANUAL STRIP-MCNC: 162 MG/DL (ref 74–99)
GLUCOSE SERPL-MCNC: 164 MG/DL (ref 74–99)
HCT VFR BLD AUTO: 35.6 % (ref 36–46)
HGB BLD-MCNC: 11.2 G/DL (ref 12–16)
IMM GRANULOCYTES # BLD AUTO: 0.11 X10*3/UL (ref 0–0.5)
IMM GRANULOCYTES NFR BLD AUTO: 2.6 % (ref 0–0.9)
LYMPHOCYTES # BLD AUTO: 0.28 X10*3/UL (ref 0.8–3)
LYMPHOCYTES NFR BLD AUTO: 6.7 %
MAGNESIUM SERPL-MCNC: 1.76 MG/DL (ref 1.6–2.4)
MCH RBC QN AUTO: 27.4 PG (ref 26–34)
MCHC RBC AUTO-ENTMCNC: 31.5 G/DL (ref 32–36)
MCV RBC AUTO: 87 FL (ref 80–100)
MONOCYTES # BLD AUTO: 0.45 X10*3/UL (ref 0.05–0.8)
MONOCYTES NFR BLD AUTO: 10.7 %
NEUTROPHILS # BLD AUTO: 3.3 X10*3/UL (ref 1.6–5.5)
NEUTROPHILS NFR BLD AUTO: 78.8 %
NRBC BLD-RTO: 0 /100 WBCS (ref 0–0)
PHOSPHATE SERPL-MCNC: 2.6 MG/DL (ref 2.5–4.9)
PLATELET # BLD AUTO: 297 X10*3/UL (ref 150–450)
POTASSIUM SERPL-SCNC: 3.6 MMOL/L (ref 3.5–5.3)
RBC # BLD AUTO: 4.09 X10*6/UL (ref 4–5.2)
SODIUM SERPL-SCNC: 140 MMOL/L (ref 136–145)
TACROLIMUS BLD-MCNC: 11.1 NG/ML
WBC # BLD AUTO: 4.2 X10*3/UL (ref 4.4–11.3)

## 2024-05-09 PROCEDURE — 2500000001 HC RX 250 WO HCPCS SELF ADMINISTERED DRUGS (ALT 637 FOR MEDICARE OP)

## 2024-05-09 PROCEDURE — 2500000004 HC RX 250 GENERAL PHARMACY W/ HCPCS (ALT 636 FOR OP/ED): Performed by: STUDENT IN AN ORGANIZED HEALTH CARE EDUCATION/TRAINING PROGRAM

## 2024-05-09 PROCEDURE — 2500000004 HC RX 250 GENERAL PHARMACY W/ HCPCS (ALT 636 FOR OP/ED)

## 2024-05-09 PROCEDURE — RXMED WILLOW AMBULATORY MEDICATION CHARGE

## 2024-05-09 PROCEDURE — 85025 COMPLETE CBC W/AUTO DIFF WBC: CPT

## 2024-05-09 PROCEDURE — 83735 ASSAY OF MAGNESIUM: CPT

## 2024-05-09 PROCEDURE — 80197 ASSAY OF TACROLIMUS: CPT

## 2024-05-09 PROCEDURE — 2500000002 HC RX 250 W HCPCS SELF ADMINISTERED DRUGS (ALT 637 FOR MEDICARE OP, ALT 636 FOR OP/ED)

## 2024-05-09 PROCEDURE — 36415 COLL VENOUS BLD VENIPUNCTURE: CPT

## 2024-05-09 PROCEDURE — 99239 HOSP IP/OBS DSCHRG MGMT >30: CPT | Performed by: INTERNAL MEDICINE

## 2024-05-09 PROCEDURE — 84100 ASSAY OF PHOSPHORUS: CPT

## 2024-05-09 PROCEDURE — 82947 ASSAY GLUCOSE BLOOD QUANT: CPT

## 2024-05-09 PROCEDURE — 2500000001 HC RX 250 WO HCPCS SELF ADMINISTERED DRUGS (ALT 637 FOR MEDICARE OP): Performed by: STUDENT IN AN ORGANIZED HEALTH CARE EDUCATION/TRAINING PROGRAM

## 2024-05-09 PROCEDURE — 99233 SBSQ HOSP IP/OBS HIGH 50: CPT | Performed by: INTERNAL MEDICINE

## 2024-05-09 RX ORDER — MAGNESIUM SULFATE HEPTAHYDRATE 40 MG/ML
2 INJECTION, SOLUTION INTRAVENOUS ONCE
Status: COMPLETED | OUTPATIENT
Start: 2024-05-09 | End: 2024-05-09

## 2024-05-09 RX ORDER — AMLODIPINE BESYLATE 5 MG/1
5 TABLET ORAL DAILY
Status: DISCONTINUED | OUTPATIENT
Start: 2024-05-09 | End: 2024-05-09 | Stop reason: HOSPADM

## 2024-05-09 RX ORDER — MYCOPHENOLATE MOFETIL 500 MG/1
250 TABLET ORAL 2 TIMES DAILY
Qty: 30 TABLET | Refills: 2 | Status: SHIPPED | OUTPATIENT
Start: 2024-05-09 | End: 2024-08-07

## 2024-05-09 RX ORDER — INSULIN GLARGINE 100 [IU]/ML
40 INJECTION, SOLUTION SUBCUTANEOUS NIGHTLY
Qty: 12 ML | Refills: 2 | Status: CANCELLED | OUTPATIENT
Start: 2024-05-09 | End: 2024-08-07

## 2024-05-09 RX ORDER — TACROLIMUS 0.5 MG/1
0.5 CAPSULE, GELATIN COATED ORAL
Qty: 90 CAPSULE | Refills: 1 | Status: SHIPPED | OUTPATIENT
Start: 2024-05-09 | End: 2024-08-07

## 2024-05-09 RX ORDER — AMLODIPINE BESYLATE 5 MG/1
5 TABLET ORAL DAILY
Qty: 30 TABLET | Refills: 2 | Status: SHIPPED | OUTPATIENT
Start: 2024-05-10 | End: 2024-08-08

## 2024-05-09 RX ORDER — CEPHALEXIN 500 MG/1
500 CAPSULE ORAL EVERY 6 HOURS SCHEDULED
Qty: 10 CAPSULE | Refills: 0 | Status: SHIPPED | OUTPATIENT
Start: 2024-05-09 | End: 2024-05-12

## 2024-05-09 RX ORDER — POTASSIUM CHLORIDE 1.5 G/1.58G
40 POWDER, FOR SOLUTION ORAL ONCE
Status: COMPLETED | OUTPATIENT
Start: 2024-05-09 | End: 2024-05-09

## 2024-05-09 RX ORDER — INSULIN GLARGINE 100 [IU]/ML
INJECTION, SOLUTION SUBCUTANEOUS
Qty: 15 ML | Refills: 5 | Status: SHIPPED | OUTPATIENT
Start: 2024-05-09 | End: 2024-06-03 | Stop reason: SDUPTHER

## 2024-05-09 RX ADMIN — Medication 2000 UNITS: at 09:21

## 2024-05-09 RX ADMIN — INSULIN LISPRO 5 UNITS: 100 INJECTION, SOLUTION INTRAVENOUS; SUBCUTANEOUS at 09:22

## 2024-05-09 RX ADMIN — MAGNESIUM SULFATE HEPTAHYDRATE 2 G: 40 INJECTION, SOLUTION INTRAVENOUS at 11:19

## 2024-05-09 RX ADMIN — HYDRALAZINE HYDROCHLORIDE 100 MG: 25 TABLET ORAL at 09:21

## 2024-05-09 RX ADMIN — CEPHALEXIN 500 MG: 500 CAPSULE ORAL at 00:08

## 2024-05-09 RX ADMIN — CEPHALEXIN 500 MG: 500 CAPSULE ORAL at 05:16

## 2024-05-09 RX ADMIN — CEPHALEXIN 500 MG: 500 CAPSULE ORAL at 12:50

## 2024-05-09 RX ADMIN — CARVEDILOL 50 MG: 12.5 TABLET, FILM COATED ORAL at 09:21

## 2024-05-09 RX ADMIN — PREDNISONE 5 MG: 5 TABLET ORAL at 09:21

## 2024-05-09 RX ADMIN — MYCOPHENOLATE MOFETIL 250 MG: 500 TABLET, FILM COATED ORAL at 09:21

## 2024-05-09 RX ADMIN — HEPARIN SODIUM 5000 UNITS: 5000 INJECTION INTRAVENOUS; SUBCUTANEOUS at 05:16

## 2024-05-09 RX ADMIN — TACROLIMUS 0.5 MG: 0.5 CAPSULE ORAL at 05:21

## 2024-05-09 RX ADMIN — HEPARIN SODIUM 5000 UNITS: 5000 INJECTION INTRAVENOUS; SUBCUTANEOUS at 12:50

## 2024-05-09 RX ADMIN — ASPIRIN 325 MG: 325 TABLET, COATED ORAL at 09:21

## 2024-05-09 RX ADMIN — POTASSIUM CHLORIDE 40 MEQ: 1.5 POWDER, FOR SOLUTION ORAL at 09:21

## 2024-05-09 RX ADMIN — AMLODIPINE BESYLATE 5 MG: 5 TABLET ORAL at 09:30

## 2024-05-09 ASSESSMENT — COGNITIVE AND FUNCTIONAL STATUS - GENERAL
MOVING TO AND FROM BED TO CHAIR: A LITTLE
CLIMB 3 TO 5 STEPS WITH RAILING: A LOT
DAILY ACTIVITIY SCORE: 13
TURNING FROM BACK TO SIDE WHILE IN FLAT BAD: A LITTLE
HELP NEEDED FOR BATHING: A LOT
DRESSING REGULAR LOWER BODY CLOTHING: A LOT
EATING MEALS: A LITTLE
WALKING IN HOSPITAL ROOM: A LITTLE
DRESSING REGULAR UPPER BODY CLOTHING: A LOT
STANDING UP FROM CHAIR USING ARMS: A LITTLE
TOILETING: A LOT
MOBILITY SCORE: 18
PERSONAL GROOMING: A LOT

## 2024-05-09 ASSESSMENT — PAIN SCALES - GENERAL: PAINLEVEL_OUTOF10: 0 - NO PAIN

## 2024-05-09 NOTE — DISCHARGE SUMMARY
Discharge Diagnosis  Uncontrolled diabetes mellitus with mild DKA  Urinary tract infection (Klebsiella pneumonia/variicola)  Acute kidney injury secondary dehydration  Metabolic encephalopathy related to dehydration, DKA and UTI    Issues Requiring Follow-Up  [ ] Follow up on BP, was hypertensive during hospital stay (SBP 170s). Added on amlodipine 5mg.  [ ] Transplant - CBC, BMP, Tacrolimus level ordered for 5/13   [ ] Endocrine - Increased glargine from 15u to 18u, continued on SSI, Farxiga, and Ozempic.     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 Labs on presentation notable for hyperglycemia in the 400s with borderline anion gap c/f DKA.  Per patient, she had missed several days of insulin due to confusion and overall malaise. While inpatient she required 40u glargine at night and 5u lispro with meals. A1c 6.8. After speaking with the patient's outpatient endocrinologist, she was discharged on Glargine 18u at night + SSI, home Farxiga, and Ozempic. During her hospital stay, she was additionally found to have Klebsiella pneumoniae UTI. Was treated with CTX and transitioned to Keflex for a total 7 day course of antibiotics. Transplant nephrology was additionally following and they recommended decreasing her MMF to 500mg BID, tacrolimus to 0.5mg BID, and continuing her predisone 5mg daily. Will hold home Valcyte given CMV PCR was negative. Follow up CBC, BMP, tacrolimus level was ordered for 5/13, with transplant follow up scheduled for 5/21. Amlodipine 5mg was additionally added for improved BP management.  Was discharged home on 5/9.     Pertinent Physical Exam At Time of Discharge  Physical Exam  Constitutional:       General: She is not in acute distress.     Appearance: Normal appearance. She is not ill-appearing.   HENT:      Head: Normocephalic and atraumatic.   Eyes:      Extraocular Movements: Extraocular movements intact.      Pupils: Pupils are equal, round, and reactive to light.   Cardiovascular:      Rate and Rhythm: Normal rate.      Heart sounds: Murmur heard.      Comments: Systolic murmur most prominent in RUSB  Pulmonary:      Effort: Pulmonary effort is normal. No respiratory distress.      Breath sounds: Normal breath sounds.   Abdominal:      General: Abdomen is flat. Bowel sounds are normal. There is no distension.      Palpations: Abdomen is soft.      Tenderness: There is no abdominal tenderness. There is no guarding.   Musculoskeletal:         General: No swelling or tenderness.   Skin:     General: Skin is warm and dry.   Neurological:      General: No focal deficit present.      Mental Status: She is alert and oriented to person, place, and time.     Home Medications     Medication List      START taking these medications     amLODIPine 5 mg tablet; Commonly known as: Norvasc; Take 1 tablet (5 mg)   by mouth once daily.; Start taking on: May 10, 2024   cephalexin 500 mg capsule; Commonly known as: Keflex; Take 1 capsule   (500 mg) by mouth every 6 hours for 10 doses.   mycophenolate 500 mg tablet; Commonly known as: Cellcept; Take 0.5   tablets (250 mg) by mouth 2 times a day.; Replaces: mycophenolate 250 mg   capsule     CHANGE how you take these medications     insulin glargine 100 unit/mL (3 mL) pen; Commonly known as: Lantus;   inject 18 units under the skin once daily; What changed: additional   instructions   Prograf 0.5 mg capsule; Take 0.5 mg by mouth every 12 hours.; What   changed: medication strength, how much to take, when to take this, Another   medication with the same name was  "removed. Continue taking this   medication, and follow the directions you see here.     CONTINUE taking these medications     acetaminophen 325 mg tablet; Commonly known as: Tylenol   ammonium lactate 12 % lotion; Commonly known as: Lac-Hydrin; Apply   topically if needed for dry skin (apply to your feet).   aspirin 325 mg EC tablet; Take 1 tablet (325 mg) by mouth once daily.   carvedilol 25 mg tablet; Commonly known as: Coreg; Take 2 tablets (50   mg) by mouth 2 times a day.   cholecalciferol 50 mcg (2,000 unit) capsule; Commonly known as: Vitamin   D-3   dapagliflozin propanediol 10 mg; Commonly known as: Farxiga; Take 1   tablet (10 mg) by mouth once daily.   Dexcom G7 Sensor device; Generic drug: blood-glucose sensor; Change   sensor every 10 days   Easy Touch 32 gauge x 3/16\" needle; Generic drug: pen needle, diabetic;   USE AS DIRECTED TO INJECT INSULIN FOUR TIMES DAILY   ferric citrate 210 mg iron tablet   fluticasone 50 mcg/actuation nasal spray; Commonly known as: Flonase   hydrALAZINE 100 mg tablet; Commonly known as: Apresoline; Take 1 tablet   (100 mg) by mouth 3 times a day.   insulin lispro 100 unit/mL injection; Commonly known as: HumaLOG; Inject   8 Units under the skin 3 times a day with meals. with meals plus sliding   scale, expect up to 40 units daily   lovastatin 40 mg tablet; Commonly known as: Mevacor; Take 1 tablet (40   mg) by mouth once daily at bedtime.   magnesium oxide 400 mg (241.3 mg magnesium) tablet; Commonly known as:   Mag-Ox; Take 1 tablet (400 mg) by mouth once daily.   predniSONE 5 mg tablet; Commonly known as: Deltasone; TAKE ONE (1)   TABLET BY MOUTH ONCE DAILY.   semaglutide 2 mg/dose (8 mg/3 mL) pen injector; INJECT 2MG UNDER THE   SKIN ONCE WEEKLY AS DIRECTED.     STOP taking these medications     mycophenolate 250 mg capsule; Commonly known as: Cellcept; Replaced by:   mycophenolate 500 mg tablet   valGANciclovir 450 mg tablet; Commonly known as: Valcyte       Outpatient " Follow-Up  Future Appointments   Date Time Provider Department Center   5/21/2024  8:00 AM TXP KIDNEY PROVIDER [id number] [facility]   5/23/2024 11:30 AM Jonan Mayo DO [id number] [facility]   6/14/2024  8:40 AM Brenda Casey MD [id number] [facility]   7/12/2024  8:00 AM Nick Campoverde MD [id number] [facility]   4/25/2025  9:45 AM Corinne Shea MD [id number] [facility]       Miriam Garcia MD    ATTENDING:  Patient doing well today  Denies any new complaints    PE:  No acute distress  Lungs - clear  CV - S1, S2 - nl  Ext -no edema    Medical Decision Making:  Clinically patient is doing well.  Fasting blood sugar this morning was 164.  We discussed her hyperglycemia management with her endocrinologist Dr. Orozco.  Based on her input we will discharge the patient on Lantus 18 units daily with sliding scale lispro insulin preprandial.  In addition patient will resume her Farxiga and Ozempic.  She will also complete her course of antibiotics (Keflex) for her UTI.      40 minutes were spent in discharge management that included evaluation of the patient during rounds, review of labs, radiology, discharge medications and follow-up advice.

## 2024-05-09 NOTE — NURSING NOTE
Discharge note:    Pt discharged home. Pt came in due to lethargy and weakness. Pt stable throughout the shift. Pt given a copy of their discharge instructions, stated understanding of all of the information. Pts IV removed. Pt took all of their information home. Pt provided a ride to [facility] main via wheelchair transport.       Carlos Okeefe RN

## 2024-05-09 NOTE — PROGRESS NOTES
Jacklyn Villa is a 73 y.o. female on day 4 of admission presenting with Hyperglycemia.    Transitional Care Coordination Progress Note:  Patient discussed during interdisciplinary rounds.   Team members present: MD and TCC  Plan per Medical/Surgical team: Figuring out Insulin Regimen, then medically ready.  Payor: [employer]  Discharge disposition: Home  Potential Barriers: None  ADOD: 05/09/24    KENIA WALLACE RN

## 2024-05-09 NOTE — CARE PLAN
The patient's goals for the shift include  no increase in oxygen req, systolic bp below 180, no falls    The clinical goals for the shift include pt will remain free from falls or injury, bp will be below 180 systolic      Problem: Skin  Goal: Decreased wound size/increased tissue granulation at next dressing change  Outcome: Progressing  Flowsheets (Taken 5/9/2024 1206)  Decreased wound size/increased tissue granulation at next dressing change: Promote sleep for wound healing  Goal: Participates in plan/prevention/treatment measures  Outcome: Progressing  Flowsheets (Taken 5/9/2024 1206)  Participates in plan/prevention/treatment measures: Elevate heels  Goal: Prevent/manage excess moisture  Outcome: Progressing  Flowsheets (Taken 5/9/2024 1206)  Prevent/manage excess moisture: Follow provider orders for dressing changes  Goal: Prevent/minimize sheer/friction injuries  Outcome: Progressing  Flowsheets (Taken 5/9/2024 1206)  Prevent/minimize sheer/friction injuries:   HOB 30 degrees or less   Turn/reposition every 2 hours/use positioning/transfer devices  Goal: Promote/optimize nutrition  Outcome: Progressing  Flowsheets (Taken 5/9/2024 1206)  Promote/optimize nutrition: Monitor/record intake including meals  Goal: Promote skin healing  Outcome: Progressing  Flowsheets (Taken 5/9/2024 1206)  Promote skin healing: Rotate device position/do not position patient on device     Problem: Diabetes  Goal: Achieve decreasing blood glucose levels by end of shift  Outcome: Progressing  Goal: Increase stability of blood glucose readings by end of shift  Outcome: Progressing  Goal: Decrease in ketones present in urine by end of shift  Outcome: Progressing  Goal: Maintain electrolyte levels within acceptable range throughout shift  Outcome: Progressing  Goal: Maintain glucose levels >70mg/dl to <250mg/dl throughout shift  Outcome: Progressing  Goal: No changes in neurological exam by end of shift  Outcome: Progressing  Goal:  Learn about and adhere to nutrition recommendations by end of shift  Outcome: Progressing  Goal: Vital signs within normal range for age by end of shift  Outcome: Progressing  Goal: Increase self care and/or family involovement by end of shift  Outcome: Progressing  Goal: Receive DSME education by end of shift  Outcome: Progressing

## 2024-05-09 NOTE — DISCHARGE INSTRUCTIONS
Ms. Villa,      You were initially admitted due to generalized weakness and lethargy. On presentation to the hospital, you were found to have a blood glucose in the 400s, along with a urinary tract infection. We treated you with antibiotics and made several changes to your insulin regimen and your immunosuppression medications. Please see the changes in your medications listed below.  You will also need to obtain labs on Monday 5/13 (CBC, BMP, tacrolimus level). For the tacrolimus level, please make sure that the lab is collected ~1 hour BEFORE your morning Tacrolimus dose.      Medication Changes:  START:  - Insulin Glargine 18 units at night (9pm) along with your previous sliding scale insulin. You will continue your Farxiga 10mg once daily and your Ozempic 2mg once weekly. Please inject your ozempic weekly on Sundays as usual.  - Keflex 500mg every 6 hours through Sunday 5/12  - Amlodipine 5mg daily for your blood pressure     CHANGE:   - Regarding your medications for your renal transplant:               - Continue taking your prednisone 5mg daily   - Decrease your Tacrolimus to 0.5mg twice a day   - Decrease your Cellcept to 250mg twice a day   - STOP Valcyte (valganciclovir)    CONTINUE: Please continue medications as prescribed.      Follow up appointment:   - Please follow up with transplant nephrology on 5/21  - Please follow up with endocrine on 6/14. If your blood glucose is > 300, please call your Endocrinologist's office for possible adjustments to your medications.      Thank you for letting us take care of you!       Medicine Team

## 2024-05-09 NOTE — PROGRESS NOTES
Jacklyn Villa is a 73 y.o. female on day 4 of admission presenting with Hyperglycemia.      Subjective   5/9: Pt seen sitting in chair. No acute complaints.        Objective          Vitals 24HR  Heart Rate:  [86-93]   Temp:  [35.6 °C (96 °F)-36.7 °C (98.1 °F)]   Resp:  [16-18]   BP: (159-173)/(71-76)   SpO2:  [92 %-99 %]     Intake/Output last 3 Shifts:    Intake/Output Summary (Last 24 hours) at 5/9/2024 1525  Last data filed at 5/9/2024 0500  Gross per 24 hour   Intake --   Output 650 ml   Net -650 ml       Physical Exam  General appearance: AAOx3  Eyes: non-icteric  Skin: no apparent rash  Heart: rhythm regular.  Lungs: CTA bilat.  np wheezing/crackles, on room air  Abdomen: soft, nt/nd  Extremities: no edema bilat  : no Singh  Neuro: No FND, follows commands  Dialysis ACCESS: no      Relevant Results    Current Facility-Administered Medications:     amLODIPine (Norvasc) tablet 5 mg, 5 mg, oral, Daily, Miriam Garcia MD, 5 mg at 05/09/24 0930    aspirin EC tablet 325 mg, 325 mg, oral, Daily, Miriam Garcia MD, 325 mg at 05/09/24 0921    atorvastatin (Lipitor) tablet 10 mg, 10 mg, oral, Nightly, Solange Flores MD, 10 mg at 05/08/24 2047    carvedilol (Coreg) tablet 50 mg, 50 mg, oral, BID, Solange Flores MD, 50 mg at 05/09/24 0921    cephalexin (Keflex) capsule 500 mg, 500 mg, oral, q6h JENNIFER, Inocencio Morris MD, 500 mg at 05/09/24 1250    cholecalciferol (Vitamin D-3) tablet 2,000 Units, 2,000 Units, oral, Daily, Solange Flores MD, 2,000 Units at 05/09/24 0921    dextrose 50 % injection 12.5 g, 12.5 g, intravenous, q15 min PRN, Solange Flores MD    dextrose 50 % injection 25 g, 25 g, intravenous, q15 min PRN, Solange Flores MD    glucagon (Glucagen) injection 1 mg, 1 mg, intramuscular, q15 min PRN, Solange Flores MD    glucagon (Glucagen) injection 1 mg, 1 mg, intramuscular, q15 min PRN, Solange Flores MD    heparin (porcine) injection 5,000 Units, 5,000 Units, subcutaneous, q8h, Miriam Garcia MD, 5,000  Units at 05/09/24 1250    hydrALAZINE (Apresoline) tablet 100 mg, 100 mg, oral, TID, Inocencio Morris MD, 100 mg at 05/09/24 0921    insulin glargine (Lantus) injection 40 Units, 40 Units, subcutaneous, Nightly, Inocencio Morris MD, 40 Units at 05/08/24 2050    insulin lispro (HumaLOG) injection 0-5 Units, 0-5 Units, subcutaneous, TID with meals, Solange Flores MD, 3 Units at 05/08/24 1731    insulin lispro (HumaLOG) injection 5 Units, 5 Units, subcutaneous, TID with meals, Miriam Garcia MD, 5 Units at 05/09/24 0922    mycophenolate (Cellcept) tablet 250 mg, 250 mg, oral, BID, Inocencio Morris MD, 250 mg at 05/09/24 0921    polyethylene glycol (Glycolax, Miralax) packet 17 g, 17 g, oral, Daily PRN, Miriam Garcia MD    predniSONE (Deltasone) tablet 5 mg, 5 mg, oral, Daily, Solange Floers MD, 5 mg at 05/09/24 0921    tacrolimus (Prograf) capsule 0.5 mg, 0.5 mg, oral, q12h JENNIFER, Inocencio Morris MD, 0.5 mg at 05/09/24 0521    Current Outpatient Medications:     acetaminophen (Tylenol) 325 mg tablet, Take by mouth every 6 hours if needed., Disp: , Rfl:     [START ON 5/10/2024] amLODIPine (Norvasc) 5 mg tablet, Take 1 tablet (5 mg) by mouth once daily., Disp: 30 tablet, Rfl: 2    ammonium lactate (Lac-Hydrin) 12 % lotion, Apply topically if needed for dry skin (apply to your feet)., Disp: 396 g, Rfl: 3    aspirin 325 mg EC tablet, Take 1 tablet (325 mg) by mouth once daily., Disp: 90 tablet, Rfl: 3    carvedilol (Coreg) 25 mg tablet, Take 2 tablets (50 mg) by mouth 2 times a day., Disp: 120 tablet, Rfl: 11    cephalexin (Keflex) 500 mg capsule, Take 1 capsule (500 mg) by mouth every 6 hours for 10 doses., Disp: 10 capsule, Rfl: 0    cholecalciferol (Vitamin D-3) 50 mcg (2,000 unit) capsule, Take 1 capsule (50 mcg) by mouth once daily., Disp: , Rfl:     dapagliflozin propanediol (Farxiga) 10 mg, Take 1 tablet (10 mg) by mouth once daily., Disp: 90 tablet, Rfl: 1    Dexcom G7 Sensor device, Change sensor every 10 days, Disp: 3 each, Rfl:  "11    ferric citrate 210 mg iron tablet, Take by mouth., Disp: , Rfl:     fluticasone (Flonase) 50 mcg/actuation nasal spray, Administer 1 spray into affected nostril(s) once daily as needed for rhinitis or allergies., Disp: , Rfl:     hydrALAZINE (Apresoline) 100 mg tablet, Take 1 tablet (100 mg) by mouth 3 times a day., Disp: 270 tablet, Rfl: 3    insulin glargine (Lantus) 100 unit/mL (3 mL) pen, inject 18 units under the skin once daily, Disp: 15 mL, Rfl: 5    insulin lispro (HumaLOG) 100 unit/mL injection, Inject 8 Units under the skin 3 times a day with meals. with meals plus sliding scale, expect up to 40 units daily, Disp: 30 mL, Rfl: 0    lovastatin (Mevacor) 40 mg tablet, Take 1 tablet (40 mg) by mouth once daily at bedtime., Disp: 90 tablet, Rfl: 3    magnesium oxide (Mag-Ox) 400 mg (241.3 mg magnesium) tablet, Take 1 tablet (400 mg) by mouth once daily., Disp: 90 tablet, Rfl: 3    mycophenolate (Cellcept) 500 mg tablet, Take 0.5 tablets (250 mg) by mouth 2 times a day., Disp: 30 tablet, Rfl: 2    pen needle, diabetic 32 gauge x 3/16\" needle, USE AS DIRECTED TO INJECT INSULIN FOUR TIMES DAILY, Disp: 400 each, Rfl: 3    predniSONE (Deltasone) 5 mg tablet, TAKE ONE (1) TABLET BY MOUTH ONCE DAILY., Disp: 90 tablet, Rfl: 3    semaglutide 2 mg/dose (8 mg/3 mL) pen injector, INJECT 2MG UNDER THE SKIN ONCE WEEKLY AS DIRECTED., Disp: 3 mL, Rfl: 5    tacrolimus (Prograf) 0.5 mg capsule, Take 0.5 mg by mouth every 12 hours., Disp: 90 capsule, Rfl: 1    Results for orders placed or performed during the hospital encounter of 05/04/24 (from the past 24 hour(s))   POCT GLUCOSE   Result Value Ref Range    POCT Glucose 266 (H) 74 - 99 mg/dL   POCT GLUCOSE   Result Value Ref Range    POCT Glucose 215 (H) 74 - 99 mg/dL   CBC and Auto Differential   Result Value Ref Range    WBC 4.2 (L) 4.4 - 11.3 x10*3/uL    nRBC 0.0 0.0 - 0.0 /100 WBCs    RBC 4.09 4.00 - 5.20 x10*6/uL    Hemoglobin 11.2 (L) 12.0 - 16.0 g/dL    Hematocrit " 35.6 (L) 36.0 - 46.0 %    MCV 87 80 - 100 fL    MCH 27.4 26.0 - 34.0 pg    MCHC 31.5 (L) 32.0 - 36.0 g/dL    RDW 14.4 11.5 - 14.5 %    Platelets 297 150 - 450 x10*3/uL    Neutrophils % 78.8 40.0 - 80.0 %    Immature Granulocytes %, Automated 2.6 (H) 0.0 - 0.9 %    Lymphocytes % 6.7 13.0 - 44.0 %    Monocytes % 10.7 2.0 - 10.0 %    Eosinophils % 0.7 0.0 - 6.0 %    Basophils % 0.5 0.0 - 2.0 %    Neutrophils Absolute 3.30 1.60 - 5.50 x10*3/uL    Immature Granulocytes Absolute, Automated 0.11 0.00 - 0.50 x10*3/uL    Lymphocytes Absolute 0.28 (L) 0.80 - 3.00 x10*3/uL    Monocytes Absolute 0.45 0.05 - 0.80 x10*3/uL    Eosinophils Absolute 0.03 0.00 - 0.40 x10*3/uL    Basophils Absolute 0.02 0.00 - 0.10 x10*3/uL   Renal Function Panel   Result Value Ref Range    Glucose 164 (H) 74 - 99 mg/dL    Sodium 140 136 - 145 mmol/L    Potassium 3.6 3.5 - 5.3 mmol/L    Chloride 104 98 - 107 mmol/L    Bicarbonate 26 21 - 32 mmol/L    Anion Gap 14 10 - 20 mmol/L    Urea Nitrogen 31 (H) 6 - 23 mg/dL    Creatinine 0.99 0.50 - 1.05 mg/dL    eGFR 61 >60 mL/min/1.73m*2    Calcium 8.9 8.6 - 10.6 mg/dL    Phosphorus 2.6 2.5 - 4.9 mg/dL    Albumin 2.5 (L) 3.4 - 5.0 g/dL   Magnesium   Result Value Ref Range    Magnesium 1.76 1.60 - 2.40 mg/dL   Tacrolimus level   Result Value Ref Range    Tacrolimus  11.1 <=15.0 ng/mL   POCT GLUCOSE   Result Value Ref Range    POCT Glucose 135 (H) 74 - 99 mg/dL   POCT GLUCOSE   Result Value Ref Range    POCT Glucose 162 (H) 74 - 99 mg/dL        Assessment and Plan  73 y.o. 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 Rivera, KDPI 78%, PRA 96%. HCV -/ R -, HBcAb D+/R HBsAb+ on Vemlidy,CMV D+/R+, EBV D+ / R-). Left donor kidney, transplanted to patient right pelvis. H/o DGF post operatively needing dialysis once due to hyperkalemia with eventual renal recovery who presentd on 5/4/2024 for lethargy, generalized weakness, loss of appetite, and diarrhea. Reports  skipping all meds for several days. Labs notable for hyperglycemia, mild DKA, SHWETA.      Allograft function: s/p DDKT 8/2022  SHWETA Etiology: volume depletion, UTI  - Cr trend 1.0 - 0.9  - baseline Cr 0.9-1.   - Electrolytes: K and HCO3 WNL  - nonoliguric - 0.6L in 24h  - Bps above goal. 170s/60s. No hypervolemia on exam.   - Transplant Renal US - negative for hydronephrosis, resistive indices WNL  - UPC 1.5g  - DSA/HLA antibody panel done on 5/5  - renal function stable; okay for discharge; on Monday, re-check BMP, CBC, and tacrolimus level  - follow-up in Transplant Nephrology clinic    Immunosuppression:  - home: tac 1.5mg q12h  - tacrolimus goal 5-8; check tac level 30min-1h before AM dose; ensure dosed 12 h apart  - FK level 5/8 : 14  - FK level 5/9 : 11  - continue tacrolimus 0.5mg BID as tac level coming down  - continue  mg bid and pred 5mg/d.    UTI Klebsiella  - Urine cx 5/4 with klebsiella pneumonia  - Was on ceftriaxone. 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 Pt has been on treatment dose Valcyte several months. Last CMV PCR neg 4/8/24. Rpt CMV PCR on 5/5 is negative.   - continue holding valcyte for now; will need to re-evaluate resuming it upon next clinic visit    Anemia  - Hb acceptable.     Mineral Disease of Bone  - Ca, Phos acceptable. Monitor.     D/w Dr. Micheline Sue MD  Nephrology Fellow PGY 5  Contact via secure chat

## 2024-05-09 NOTE — HOSPITAL COURSE
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 Labs on presentation notable for hyperglycemia in the 400s with borderline anion gap c/f DKA.  Per patient, she had missed several days of insulin due to confusion and overall malaise. While inpatient she required 40u glargine at night and 5u lispro with meals. A1c 6.8. After speaking with the patient's outpatient endocrinologist, she was discharged on Glargine 18u at night + SSI, home Farxiga, and Ozempic. During her hospital stay, she was additionally found to have Klebsiella pneumoniae UTI. Was treated with CTX and transitioned to Keflex for a total 7 day course of antibiotics. Transplant nephrology was additionally following and they recommended decreasing her MMF to 250mg BID, tacrolimus to 0.5mg BID, and continuing her predisone 5mg daily. Will hold home Valcyte given CMV PCR was negative. Follow up CBC, BMP, tacrolimus level was ordered for 5/13, with transplant follow up scheduled for 5/21. Amlodipine 5mg was additionally added for improved BP management. Was discharged home on 5/9.

## 2024-05-10 ENCOUNTER — PATIENT OUTREACH (OUTPATIENT)
Dept: CARE COORDINATION | Facility: CLINIC | Age: 74
End: 2024-05-10
Payer: COMMERCIAL

## 2024-05-10 NOTE — PROGRESS NOTES
Outreach call to patient to support a smooth transition of care from recent admission.  Spoke with patient, reviewed discharge medications, discharge instructions, assessed social needs, and discussed follow-up appointment with provider.  Will continue to monitor through transition period.    Engagement  Call Start Time: 1510 (5/10/2024  3:11 PM)    Medications  Medications reviewed with patient/caregiver?: Yes (5/10/2024  3:11 PM)  Is the patient having any side effects they believe may be caused by any medication additions or changes?: No (5/10/2024  3:11 PM)  Does the patient have all medications ordered at discharge?: Yes (5/10/2024  3:11 PM)  Care Management Interventions: No intervention needed (5/10/2024  3:11 PM)  Is the patient taking all medications as directed (includes completed medication regime)?: Yes (5/10/2024  3:11 PM)    Appointments  Does the patient have a primary care provider?: Yes (5/10/2024  3:11 PM)  Care Management Interventions: Verified appointment date/time/provider (5/10/2024  3:11 PM)  Has the patient kept scheduled appointments due by today?: Yes (5/10/2024  3:11 PM)  Care Management Interventions: Advised patient to keep appointment (5/10/2024  3:11 PM)    Self Management  What is the home health agency?: None (5/10/2024  3:11 PM)  What Durable Medical Equipment (DME) was ordered?: None (5/10/2024  3:11 PM)    Patient Teaching  Does the patient have access to their discharge instructions?: Yes (5/10/2024  3:11 PM)  Care Management Interventions: Reviewed instructions with patient (5/10/2024  3:11 PM)  What is the patient's perception of their health status since discharge?: Improving (5/10/2024  3:11 PM)  Is the patient/caregiver able to teach back the hierarchy of who to call/visit for symptoms/problems? PCP, Specialist, Home Health nurse, Urgent Care, ED, 911: Yes (5/10/2024  3:11 PM)    Wrap Up  Wrap Up Additional Comments: CM spoke with patient states she is doing well at home.   Patient has no c/o of general weakness, able to complete ADLs, appetite has been good, lives with family, patient has no urinary discomfort, BG this am was 154 took meds came down to 101 at breakfast increased to 250 states she hasn't checked her BG level as of yet but will soon.  Reviewed upcoming appointments and discharge medications.  Patient has no needs or questions.  CM will continue to follow for transitions of care.  CLARITA Aguirre, RN (5/10/2024  3:11 PM)  Call End Time: 1515 (5/10/2024  3:11 PM)

## 2024-05-21 ENCOUNTER — LAB (OUTPATIENT)
Dept: LAB | Facility: LAB | Age: 74
End: 2024-05-21
Payer: COMMERCIAL

## 2024-05-21 ENCOUNTER — OFFICE VISIT (OUTPATIENT)
Dept: TRANSPLANT | Facility: HOSPITAL | Age: 74
End: 2024-05-21
Payer: COMMERCIAL

## 2024-05-21 VITALS
SYSTOLIC BLOOD PRESSURE: 146 MMHG | OXYGEN SATURATION: 97 % | WEIGHT: 195.2 LBS | DIASTOLIC BLOOD PRESSURE: 72 MMHG | BODY MASS INDEX: 32.48 KG/M2 | HEART RATE: 93 BPM | TEMPERATURE: 97 F

## 2024-05-21 DIAGNOSIS — N18.31 STAGE 3A CHRONIC KIDNEY DISEASE (MULTI): ICD-10-CM

## 2024-05-21 DIAGNOSIS — Z94.0 KIDNEY REPLACED BY TRANSPLANT (HHS-HCC): ICD-10-CM

## 2024-05-21 DIAGNOSIS — Z94.0 IMMUNOSUPPRESSIVE MANAGEMENT ENCOUNTER FOLLOWING KIDNEY TRANSPLANT (HHS-HCC): ICD-10-CM

## 2024-05-21 DIAGNOSIS — N12 PYELONEPHRITIS: ICD-10-CM

## 2024-05-21 DIAGNOSIS — R80.9 PROTEINURIA, UNSPECIFIED TYPE: ICD-10-CM

## 2024-05-21 DIAGNOSIS — Z00.5 ENCOUNTER FOR EXAMINATION OF POTENTIAL DONOR OF ORGAN AND TISSUE: ICD-10-CM

## 2024-05-21 DIAGNOSIS — Z79.899 IMMUNOSUPPRESSIVE MANAGEMENT ENCOUNTER FOLLOWING KIDNEY TRANSPLANT (HHS-HCC): ICD-10-CM

## 2024-05-21 DIAGNOSIS — Z94.0 KIDNEY TRANSPLANT STATUS (HHS-HCC): Primary | ICD-10-CM

## 2024-05-21 DIAGNOSIS — Z09 HOSPITAL DISCHARGE FOLLOW-UP: Primary | ICD-10-CM

## 2024-05-21 LAB
ANION GAP SERPL CALC-SCNC: 15 MMOL/L (ref 10–20)
APPEARANCE UR: CLEAR
BACTERIA #/AREA URNS AUTO: ABNORMAL /HPF
BILIRUB UR STRIP.AUTO-MCNC: NEGATIVE MG/DL
BUN SERPL-MCNC: 14 MG/DL (ref 6–23)
CALCIUM SERPL-MCNC: 9.8 MG/DL (ref 8.6–10.6)
CHLORIDE SERPL-SCNC: 104 MMOL/L (ref 98–107)
CO2 SERPL-SCNC: 26 MMOL/L (ref 21–32)
COLOR UR: YELLOW
CREAT SERPL-MCNC: 0.92 MG/DL (ref 0.5–1.05)
EGFRCR SERPLBLD CKD-EPI 2021: 67 ML/MIN/1.73M*2
ERYTHROCYTE [DISTWIDTH] IN BLOOD BY AUTOMATED COUNT: 15.2 % (ref 11.5–14.5)
GLUCOSE SERPL-MCNC: 129 MG/DL (ref 74–99)
GLUCOSE UR STRIP.AUTO-MCNC: ABNORMAL MG/DL
HBV SURFACE AG SERPL QL IA: NONREACTIVE
HCT VFR BLD AUTO: 40.2 % (ref 36–46)
HGB BLD-MCNC: 12.2 G/DL (ref 12–16)
KETONES UR STRIP.AUTO-MCNC: NEGATIVE MG/DL
LEUKOCYTE ESTERASE UR QL STRIP.AUTO: NEGATIVE
MCH RBC QN AUTO: 27.8 PG (ref 26–34)
MCHC RBC AUTO-ENTMCNC: 30.3 G/DL (ref 32–36)
MCV RBC AUTO: 92 FL (ref 80–100)
MUCOUS THREADS #/AREA URNS AUTO: ABNORMAL /LPF
NITRITE UR QL STRIP.AUTO: NEGATIVE
NRBC BLD-RTO: 0 /100 WBCS (ref 0–0)
PH UR STRIP.AUTO: 6 [PH]
PLATELET # BLD AUTO: 210 X10*3/UL (ref 150–450)
POTASSIUM SERPL-SCNC: 4.6 MMOL/L (ref 3.5–5.3)
PROT UR STRIP.AUTO-MCNC: ABNORMAL MG/DL
RBC # BLD AUTO: 4.39 X10*6/UL (ref 4–5.2)
RBC # UR STRIP.AUTO: NEGATIVE /UL
RBC #/AREA URNS AUTO: ABNORMAL /HPF
SODIUM SERPL-SCNC: 140 MMOL/L (ref 136–145)
SP GR UR STRIP.AUTO: 1.02
SQUAMOUS #/AREA URNS AUTO: ABNORMAL /HPF
TACROLIMUS BLD-MCNC: 6.3 NG/ML
UROBILINOGEN UR STRIP.AUTO-MCNC: NORMAL MG/DL
WBC # BLD AUTO: 3.2 X10*3/UL (ref 4.4–11.3)
WBC #/AREA URNS AUTO: ABNORMAL /HPF

## 2024-05-21 PROCEDURE — 3044F HG A1C LEVEL LT 7.0%: CPT | Performed by: INTERNAL MEDICINE

## 2024-05-21 PROCEDURE — 3060F POS MICROALBUMINURIA REV: CPT | Performed by: INTERNAL MEDICINE

## 2024-05-21 PROCEDURE — 99215 OFFICE O/P EST HI 40 MIN: CPT | Performed by: INTERNAL MEDICINE

## 2024-05-21 PROCEDURE — 85027 COMPLETE CBC AUTOMATED: CPT

## 2024-05-21 PROCEDURE — 3078F DIAST BP <80 MM HG: CPT | Performed by: INTERNAL MEDICINE

## 2024-05-21 PROCEDURE — 1126F AMNT PAIN NOTED NONE PRSNT: CPT | Performed by: INTERNAL MEDICINE

## 2024-05-21 PROCEDURE — 80197 ASSAY OF TACROLIMUS: CPT

## 2024-05-21 PROCEDURE — 87340 HEPATITIS B SURFACE AG IA: CPT

## 2024-05-21 PROCEDURE — 1111F DSCHRG MED/CURRENT MED MERGE: CPT | Performed by: INTERNAL MEDICINE

## 2024-05-21 PROCEDURE — 80048 BASIC METABOLIC PNL TOTAL CA: CPT

## 2024-05-21 PROCEDURE — 3077F SYST BP >= 140 MM HG: CPT | Performed by: INTERNAL MEDICINE

## 2024-05-21 PROCEDURE — 1159F MED LIST DOCD IN RCRD: CPT | Performed by: INTERNAL MEDICINE

## 2024-05-21 PROCEDURE — 36415 COLL VENOUS BLD VENIPUNCTURE: CPT

## 2024-05-21 PROCEDURE — 1160F RVW MEDS BY RX/DR IN RCRD: CPT | Performed by: INTERNAL MEDICINE

## 2024-05-21 PROCEDURE — 81001 URINALYSIS AUTO W/SCOPE: CPT | Performed by: INTERNAL MEDICINE

## 2024-05-21 RX ORDER — PREDNISONE 5 MG/1
5 TABLET ORAL DAILY
Qty: 30 TABLET | Refills: 11 | Status: SHIPPED | OUTPATIENT
Start: 2024-05-21 | End: 2024-05-23 | Stop reason: WASHOUT

## 2024-05-21 ASSESSMENT — PAIN SCALES - GENERAL: PAINLEVEL: 0-NO PAIN

## 2024-05-21 NOTE — PROGRESS NOTES
TRANSPLANT NEPHROLOGY :   OUTPATIENT CLINIC NOTE      SERVICE DATE : 05/21/2024    REASON FOR VISIT/CHIEF COMPLAINT:  Hospital discharge follow up  S/P  TRANSPLANT SURGERY  IMMUNOSUPPRESSIVE MEDICATION MANAGEMENT  BLOOD PRESSURE MANAGEMENT    HPI:    Ms. Villa is a 73 y.o. female with past medical history significant for ESRD secondary to diabetes mellitus and hypertension who is s/p DDKT on 8/13/2022 (Dr. Rivera, KDPI 78%, PRA 96%. HCV -/ R -, HBcAb D+/R HBsAb+ (on Vemlidy, never seroconverted), CMV D+/R+, EBV D+ / R-). Left donor kidney, transplanted to patient right pelvis. H/o DGF post operatively needing dialysis once due to hyperkalemia with eventual renal recovery. Other PMH: hyperlipidemia, diverticulosis     Post-txp course notable for CMV viremia.    Admitted 5/9/2024 for  Uncontrolled diabetes mellitus with mild DKA  Urinary tract infection (Klebsiella pneumonia/variicola)  Acute kidney injury secondary dehydration  Metabolic encephalopathy related to dehydration, DKA and UTI    Patient is here for follow up s/p kidney transplant.    Jacklyn did lab this am. Last dose of tacrolimus was 6 pm. No LUTS.     Patient is doing well overall. No new complaints. Denied chest pain, SOB, DORADO, Palpitation. Normal urination and bowel movement. Normal gait and no weakness of arms/legs. No cough, runny nose, sore throat, cold symptoms, or rash. No hearing loss. Normal vision.No problems with his sleep, mood and function. No recent infection, hospitalization, surgery or ER visits.      ROS:  Review of  14 systems was performed system by system. See HPI. Otherwise, the symptoms were negative.    PAST MEDICAL HISTORY:  Past Medical History:   Diagnosis Date    Asymptomatic menopausal state 06/30/2021    Post-menopausal    Chronic kidney disease, stage 3 unspecified (Multi) 09/16/2022    CKD (chronic kidney disease), stage III    Chronic kidney disease, stage 4 (severe) (Multi) 10/17/2019    CKD (chronic kidney  disease), stage IV    Combined forms of age-related cataract, left eye 07/08/2020    Combined form of age-related cataract, left eye    Combined forms of age-related cataract, right eye 07/08/2020    Combined form of age-related cataract, right eye    Encounter for gynecological examination (general) (routine) without abnormal findings 11/08/2021    Well female exam with routine gynecological exam    Encounter for immunization 02/09/2017    Need for diphtheria-tetanus-pertussis (Tdap) vaccine    Encounter for immunization 02/09/2017    Need for shingles vaccine    Encounter for other preprocedural examination 09/28/2021    Pre-transplant evaluation for kidney transplant    Encounter for screening for malignant neoplasm of cervix 05/21/2018    Screening for cervical cancer    Encounter for screening for malignant neoplasm of colon 11/21/2019    Screening for colon cancer    Encounter for screening for respiratory tuberculosis 01/21/2021    Screening for tuberculosis    Impacted cerumen, right ear 02/11/2016    Impacted cerumen of right ear    Need for assistance at home and no other household member able to render care     Need for home health care    Personal history of other diseases of the circulatory system     History of hypertension    Personal history of other diseases of the female genital tract 05/21/2018    History of vaginal discharge    Personal history of other diseases of the nervous system and sense organs 01/28/2020    History of viral conjunctivitis    Personal history of other diseases of the nervous system and sense organs 02/11/2016    History of impacted cerumen    Personal history of other diseases of the nervous system and sense organs 02/11/2016    History of impacted cerumen    Personal history of other diseases of the respiratory system 01/28/2020    History of paranasal sinus congestion    Personal history of other drug therapy 11/09/2017    History of influenza vaccination    Personal  history of other endocrine, nutritional and metabolic disease 08/19/2022    History of diabetes mellitus    Personal history of other endocrine, nutritional and metabolic disease 09/09/2022    History of hyperlipidemia    Personal history of other endocrine, nutritional and metabolic disease 06/30/2021    History of hyperlipidemia    Personal history of other medical treatment 06/29/2021    History of screening mammography    Personal history of other medical treatment 09/12/2019    History of screening mammography    Type 1 diabetes mellitus with hypoglycemia with coma (Multi) 05/27/2022    Type 1 diabetes mellitus with hypoglycemic coma    Type 2 diabetes mellitus without complications (Multi) 01/16/2018    Diabetes mellitus type 2 without retinopathy        PAST SURGICAL HISTORY:  Past Surgical History:   Procedure Laterality Date    OTHER SURGICAL HISTORY  09/15/2022    Kidney transplantation    OTHER SURGICAL HISTORY  06/05/2020    Colonoscopy    OTHER SURGICAL HISTORY  06/05/2020    Arteriovenous fistula creation procedure    OTHER SURGICAL HISTORY  10/17/2019    Biopsy Renal        SOCIAL HISTORY:  Social History     Socioeconomic History    Marital status: Single     Spouse name: Not on file    Number of children: Not on file    Years of education: Not on file    Highest education level: Not on file   Occupational History    Not on file   Tobacco Use    Smoking status: Never    Smokeless tobacco: Never   Substance and Sexual Activity    Alcohol use: Never    Drug use: Never    Sexual activity: Not on file   Other Topics Concern    Not on file   Social History Narrative    Not on file     Social Determinants of Health     Financial Resource Strain: Low Risk  (5/8/2024)    Overall Financial Resource Strain (CARDIA)     Difficulty of Paying Living Expenses: Not hard at all   Food Insecurity: Not on file   Transportation Needs: No Transportation Needs (5/8/2024)    PRAPARE - Transportation     Lack of  "Transportation (Medical): No     Lack of Transportation (Non-Medical): No   Physical Activity: Not on file   Stress: Not on file   Social Connections: Not on file   Intimate Partner Violence: Not on file   Housing Stability: Low Risk  (5/8/2024)    Housing Stability Vital Sign     Unable to Pay for Housing in the Last Year: No     Number of Places Lived in the Last Year: 1     Unstable Housing in the Last Year: No       FAMILY HISTORY:  Family History   Problem Relation Name Age of Onset    Ovarian cancer Mother      Hypertension Sister         MEDICATION LIST:  Current Outpatient Medications   Medication Instructions    acetaminophen (Tylenol) 325 mg tablet oral, Every 6 hours PRN    amLODIPine (NORVASC) 5 mg, oral, Daily    ammonium lactate (Lac-Hydrin) 12 % lotion Topical, As needed    aspirin 325 mg, oral, Daily    carvedilol (COREG) 50 mg, oral, 2 times daily    cholecalciferol (Vitamin D-3) 50 mcg (2,000 unit) capsule 1 capsule, oral, Daily    dapagliflozin propanediol (FARXIGA) 10 mg, oral, Daily    Dexcom G7 Sensor device Change sensor every 10 days    ferric citrate 210 mg iron tablet oral    fluticasone (Flonase) 50 mcg/actuation nasal spray 1 spray, nasal, Daily PRN    hydrALAZINE (APRESOLINE) 100 mg, oral, 3 times daily    insulin glargine (Lantus) 100 unit/mL (3 mL) pen inject 18 units under the skin once daily    insulin lispro (HUMALOG) 8 Units, subcutaneous, 3 times daily (morning, midday, late afternoon), with meals plus sliding scale, expect up to 40 units daily    lovastatin (MEVACOR) 40 mg, oral, Nightly    magnesium oxide (MAG-OX) 400 mg, oral, Daily    mycophenolate (CELLCEPT) 250 mg, oral, 2 times daily    pen needle, diabetic 32 gauge x 3/16\" needle USE AS DIRECTED TO INJECT INSULIN FOUR TIMES DAILY    predniSONE (Deltasone) 5 mg tablet TAKE ONE (1) TABLET BY MOUTH ONCE DAILY.    Prograf 0.5 mg, oral, Every 12 hours scheduled (0630,1830)    semaglutide 2 mg/dose (8 mg/3 mL) pen injector " INJECT 2MG UNDER THE SKIN ONCE WEEKLY AS DIRECTED.       ALLERGY  Allergies   Allergen Reactions    Adhesive Tape-Silicones Other     Blistering    Amoxicillin Other       PHYSICAL EXAM:    Visit Vitals  /72   Pulse 93   Temp 36.1 °C (97 °F) (Temporal)   Wt 88.5 kg (195 lb 3.2 oz)   SpO2 97%   BMI 32.48 kg/m²   OB Status Postmenopausal   Smoking Status Never   BSA 2.01 m²          Vital signs - reviewed. Acceptable BP at this office visit.   General Appearance - NAD, Good speech, oriented and alert  HEENT - Supple. Not pale. No jaundice. No cervical lymphadenopathy. Pharynx and tonsils are not injected.  CVS - RRR. Normal S1/S2. No murmur, click , rub or gallop  Lungs- clear to auscultation bilaterally  Abdomen - soft , not tender, no guarding, no rigidity. No hepatosplenomegaly. Normal bowel sounds. No masses and ascites. S/P Kidney transplant .  Transplanted kidney is not tender.   Musculoskeletal /Extremities - no edema. Full ROM. No joint tenderness.   Neuro/Psych - appropriate mood and affect. Motor power V/V all extremities. CN I -XII were grossly intact.  Skin - No visible rash      LABS:    Lab Results   Component Value Date    WBC 4.2 (L) 05/09/2024    HGB 11.2 (L) 05/09/2024    HCT 35.6 (L) 05/09/2024     05/09/2024    CHOL 150 10/20/2023    TRIG 94 10/20/2023    HDL 51.6 10/20/2023    ALT 35 05/05/2024    AST 52 (H) 05/05/2024     05/09/2024    K 3.6 05/09/2024     05/09/2024    CREATININE 0.99 05/09/2024    BUN 31 (H) 05/09/2024    CO2 26 05/09/2024    TSH 2.76 11/06/2019    INR 1.2 (H) 05/04/2024    GLUF 260 (H) 05/13/2022    HGBA1C 6.8 (H) 05/04/2024     par    ASSESSMENT AND PLAN:    Ms. Villa is a 73 y.o. female  who is here for follow up s/p kidney transplant.    TRANSPLANT DATE: 8/13/2022 (Kidney)      1. ESRD S/P kidney transplant   - Creatinine last check was :  Lab Results   Component Value Date    CREATININE 0.99 05/09/2024     Creatinine clearance cannot be calculated  (Patient's most recent lab result is older than the maximum 7 days allowed.)    - Renal allograft function is STABLE.   -Random urine protein/creatinine ratio TODAY  -Allosure last check was 0.09  -Ensure adequate hydration  - Avoid nephrotoxic medications, NSAIDs, and IV contrast.    2. Immunosuppression  -Continue current immunosuppression regimen.    3. Electrolytes  Lab Results   Component Value Date    GLUCOSE 164 (H) 05/09/2024    CALCIUM 8.9 05/09/2024     05/09/2024    K 3.6 05/09/2024    CO2 26 05/09/2024     05/09/2024    BUN 31 (H) 05/09/2024    CREATININE 0.99 05/09/2024     -Acceptable from last lab drawn    4. Hypertension  Blood Pressures         5/21/2024  0801             BP: 146/72          -Home  BP had been acceptable  -Encourage to monitor home BP  -Continue current anti hypertensive medication    5. Bone Mineral Disease/Osteoporosis  Lab Results   Component Value Date    .3 (H) 10/02/2023    CALCIUM 8.9 05/09/2024    PHOS 2.6 05/09/2024    VITD25 34 03/06/2023     -check VIT D, PTH with next lab  - Consider DEXA every 2-3 years , defer to PCP    6.Anemia  Lab Results   Component Value Date    WBC 4.2 (L) 05/09/2024    HGB 11.2 (L) 05/09/2024    HCT 35.6 (L) 05/09/2024    MCV 87 05/09/2024     05/09/2024     -asymptomatic  - Continue to monitor  -check iron studies and ferritin. Will consider KLAUDIA as needed.  - No indications for blood transfusion     7.Health maintenance and vaccination  - Flu shot during flu season annually  - Cancer screening is up to date per the patient    Lab : Routine transplant lab ( CBC, RFP, and anti-rejection trough level ) every 1 months  Additional labs:  VIT D, PTH with next lab  Viral screening PCR, Allosure and UPC per protocol.    Additional Plan :  Lab this am  Add on CMV PCR PLASMA, VIT D, PTH  UA with reflex to culture and UPC ratio today  Follow up tac level  Resume prednisone 5 mg daily  Follow up endo on Thursday. Noted UTI, on  Farxiga  Lab monthly  RTC 3-4 month(s)    Deirdre Tobias    Transplant Nephrology

## 2024-05-23 ENCOUNTER — OFFICE VISIT (OUTPATIENT)
Dept: PRIMARY CARE | Facility: CLINIC | Age: 74
End: 2024-05-23
Payer: COMMERCIAL

## 2024-05-23 VITALS
OXYGEN SATURATION: 98 % | WEIGHT: 192 LBS | DIASTOLIC BLOOD PRESSURE: 69 MMHG | SYSTOLIC BLOOD PRESSURE: 128 MMHG | HEART RATE: 89 BPM | TEMPERATURE: 97.3 F | HEIGHT: 65 IN | BODY MASS INDEX: 31.99 KG/M2

## 2024-05-23 DIAGNOSIS — H91.90 CHANGE IN HEARING, UNSPECIFIED LATERALITY: ICD-10-CM

## 2024-05-23 DIAGNOSIS — M16.0 PRIMARY OSTEOARTHRITIS OF BOTH HIPS: Primary | ICD-10-CM

## 2024-05-23 PROCEDURE — 1036F TOBACCO NON-USER: CPT

## 2024-05-23 PROCEDURE — 1126F AMNT PAIN NOTED NONE PRSNT: CPT

## 2024-05-23 PROCEDURE — 1159F MED LIST DOCD IN RCRD: CPT

## 2024-05-23 PROCEDURE — 3078F DIAST BP <80 MM HG: CPT

## 2024-05-23 PROCEDURE — 3044F HG A1C LEVEL LT 7.0%: CPT

## 2024-05-23 PROCEDURE — 99213 OFFICE O/P EST LOW 20 MIN: CPT

## 2024-05-23 PROCEDURE — 3074F SYST BP LT 130 MM HG: CPT

## 2024-05-23 PROCEDURE — 1111F DSCHRG MED/CURRENT MED MERGE: CPT

## 2024-05-23 PROCEDURE — 1160F RVW MEDS BY RX/DR IN RCRD: CPT

## 2024-05-23 PROCEDURE — 3060F POS MICROALBUMINURIA REV: CPT

## 2024-05-23 RX ORDER — CLINDAMYCIN PHOSPHATE 10 UG/ML
LOTION TOPICAL
COMMUNITY
Start: 2023-02-17

## 2024-05-23 RX ORDER — KETOCONAZOLE 20 MG/ML
SHAMPOO, SUSPENSION TOPICAL
COMMUNITY
Start: 2023-02-17

## 2024-05-23 ASSESSMENT — ENCOUNTER SYMPTOMS
DIARRHEA: 0
DEPRESSION: 0
TREMORS: 1
ABDOMINAL PAIN: 0
DYSURIA: 0
WHEEZING: 0
FATIGUE: 0
PALPITATIONS: 0
COUGH: 0
HEADACHES: 0
DIZZINESS: 1
POLYDIPSIA: 0
SHORTNESS OF BREATH: 0
APPETITE CHANGE: 0
WEAKNESS: 0
LOSS OF SENSATION IN FEET: 0
LIGHT-HEADEDNESS: 0
BLOOD IN STOOL: 0
ACTIVITY CHANGE: 0

## 2024-05-23 ASSESSMENT — PATIENT HEALTH QUESTIONNAIRE - PHQ9
1. LITTLE INTEREST OR PLEASURE IN DOING THINGS: NOT AT ALL
SUM OF ALL RESPONSES TO PHQ9 QUESTIONS 1 AND 2: 0
2. FEELING DOWN, DEPRESSED OR HOPELESS: NOT AT ALL

## 2024-05-23 ASSESSMENT — PAIN SCALES - GENERAL: PAINLEVEL: 0-NO PAIN

## 2024-05-23 NOTE — PROGRESS NOTES
Subjective   Patient ID: Jacklyn Villa is a 73 y.o. female who presents for Follow-up (' I need some understanding of my medication' per pt).   Pt was hospitalized in early May due to DKA with a blood glucose reading of 522. Pt stabilized in hospital and was found to have elevated SBP and UTI. Pt discharged with amlodipine 5mg for htn and Keflex for UTI. Hospital had also increased insulin glargine to 18u daily from 16u, as well as made changes to her immunosuppression drugs prescribed by transplant nephrology. Pt wanted clarity on which regiment to be following today.     Pt also endorses that she has been walking more frequently for longer distances with her son. She is able to ambulate well, but is having hip pain from her OA when walking longer distances. Wants to be able to complete these walks, requesting a roller walker to use for sitting when feeling pain on walks.     Pt also endorses decreased audio acuity in L ear.     Diabetes  Lab Results   Component Value Date    HGBA1C 6.8 (H) 05/04/2024     Lab Results   Component Value Date    LDLCALC 80 10/20/2023     The 10-year ASCVD risk score (Ben LINDA, et al., 2019) is: 21.6%    Values used to calculate the score:      Age: 73 years      Sex: Female      Is Non- : Yes      Diabetic: Yes      Tobacco smoker: No      Systolic Blood Pressure: 128 mmHg      Is BP treated: Yes      HDL Cholesterol: 51.6 mg/dL      Total Cholesterol: 150 mg/dL  Lab Results   Component Value Date    CREATININE 0.92 05/21/2024     - medications: Lantus 18u, humalog with meals, farxiga 10mg, semaglutide  - missing any doses: no  - takes BS?: 150 in am  - highest sugar: 250 after meals  - any hypoglycemia (sx or <70): 60s-70s, comes back up after eating  - on statin: lovatatin  - on ACEi/ARB: no     Hypertension  BP Readings from Last 3 Encounters:   05/23/24 128/69   05/21/24 146/72   05/09/24 168/75     - meds: amlodipine 5mg  - missing doses?: no  - taking  "BP at home?: no  - smoking: no  - diet: overall good  - Consistent NSAIDs?: no  - denies HA, chest pains, SOB, LE edema, vision changes     Review of Systems   Constitutional:  Negative for activity change, appetite change and fatigue.   HENT:  Positive for hearing loss. Negative for congestion.    Respiratory:  Negative for cough, shortness of breath and wheezing.    Cardiovascular:  Negative for chest pain, palpitations and leg swelling.   Gastrointestinal:  Negative for abdominal pain, blood in stool and diarrhea.   Endocrine: Negative for polydipsia and polyuria.   Genitourinary:  Negative for dysuria.   Neurological:  Positive for dizziness and tremors. Negative for weakness, light-headedness and headaches.     Objective   /69 (BP Location: Right arm, Patient Position: Sitting)   Pulse 89   Temp 36.3 °C (97.3 °F) (Temporal)   Ht 1.651 m (5' 5\")   Wt 87.1 kg (192 lb)   SpO2 98%   BMI 31.95 kg/m²     Physical Exam  Vitals reviewed.   Constitutional:       Appearance: Normal appearance.   HENT:      Right Ear: Hearing, ear canal and external ear normal. There is impacted cerumen.      Left Ear: Tympanic membrane, ear canal and external ear normal. Decreased hearing noted.   Cardiovascular:      Rate and Rhythm: Normal rate and regular rhythm.      Pulses:           Dorsalis pedis pulses are 1+ on the right side and 1+ on the left side.      Heart sounds: Normal heart sounds.   Pulmonary:      Effort: Pulmonary effort is normal.      Breath sounds: Normal breath sounds.   Abdominal:      General: Abdomen is flat. Bowel sounds are normal.      Palpations: Abdomen is soft.   Musculoskeletal:         General: Normal range of motion.      Right lower leg: No edema.      Left lower leg: No edema.   Feet:      Right foot:      Protective Sensation: 10 sites tested.  10 sites sensed.      Skin integrity: Callus present. No ulcer.      Toenail Condition: Right toenails are long.      Left foot:      Protective " Sensation: 10 sites tested.  10 sites sensed.      Skin integrity: Skin integrity normal.      Toenail Condition: Left toenails are long.   Neurological:      Mental Status: She is alert and oriented to person, place, and time.   Psychiatric:         Mood and Affect: Mood normal.         Behavior: Behavior normal.       Assessment/Plan   Jacklyn Villa is a 73 year old female presenting today for follow-up on recent hospitalization d/t DKA. Patient doing well. Spent most of visit performing medications reconciliation.     Diagnoses and all orders for this visit:  Hospital follow up       - Pt counseled at length about which medications to continue vs which are ok to stop.        - Immunosuppression: pt counseled to follow the instruction for medication given to her by her transplant nephrologist. Pt follows up regularly with transplant nephrology and encouraged to keep doing so.        - Diabetes: advised to continue insulin glargine at 18u as changed by the hospital. Blood sugars have been looking good per her Dexcom. Pt using sliding scale at 8-8-4 which is working well. Pt follows with endocrinology for her diabetes and has upcoming appt.         - Keflex: pt finished her antibiotic therapy for her UTI.        - Htn: pt counseled on importance of maintaining BP at goal. Pt expressed understanding and will continue taking amlodipine 5mg.   Primary osteoarthritis of both hips  -     Pt has normal strength in all extremities and good ROM. Ambulation appears normal but hips are tender and pt experiences pain when walking long distances. Have placed an order for a walker with a seat so patient can continue to ambulate long distances to maintain her health.   - Walker with Seat  Change in hearing, unspecified laterality  -     L ear failed finger-rub hearing test, tympanic membrane was visible and appeared normal. Have placed referral to audiology for hearing screen and to discuss further management of hearing loss.    - Referral to Audiology; Future    RTC in 4 months for follow-up.     Jewell Swan, MS3  [employer]    I saw and evaluated the patient with the medical student. I personally obtained the key and critical portions of the history and physical exam. I reviewed and modified the student's documentation and discussed patient with the medical student. I agree with the above documentation and medical decision making.    Joann Mayo,  FM PGY2  Discussed with Dr. David

## 2024-05-23 NOTE — PATIENT INSTRUCTIONS
It was so great to see you today Jacklyn Villa  . Today we discussed:    -Referral to Audiology  -Ordered a rollator        Call [phone]  For Care if you need to schedule appointment with specialist or images.  IF any labs were ordered today, I will CALL if labs are ABNORMAL. If labs are NORMAL then I will comment on MyChart and mail results to you.    Follow up in       You were see by:    Dr. Joann Mayo D.O.  [facility]   Phone: [phone]

## 2024-05-28 DIAGNOSIS — Z79.4 TYPE 2 DIABETES MELLITUS WITH HYPERGLYCEMIA, WITH LONG-TERM CURRENT USE OF INSULIN (MULTI): ICD-10-CM

## 2024-05-28 DIAGNOSIS — E11.65 TYPE 2 DIABETES MELLITUS WITH HYPERGLYCEMIA, WITH LONG-TERM CURRENT USE OF INSULIN (MULTI): ICD-10-CM

## 2024-05-28 RX ORDER — BLOOD SUGAR DIAGNOSTIC
STRIP MISCELLANEOUS
Qty: 400 EACH | Refills: 3 | Status: SHIPPED | OUTPATIENT
Start: 2024-05-28 | End: 2025-05-28

## 2024-05-30 NOTE — PROGRESS NOTES
I reviewed the resident/fellow's documentation and discussed the patient with the resident/fellow. I agree with the resident/fellow's medical decision making as documented in the note.  She had left the office before being seen by me.      Marti David MD

## 2024-06-03 DIAGNOSIS — E11.65 TYPE 2 DIABETES MELLITUS WITH HYPERGLYCEMIA, WITH LONG-TERM CURRENT USE OF INSULIN (MULTI): ICD-10-CM

## 2024-06-03 DIAGNOSIS — Z79.4 TYPE 2 DIABETES MELLITUS WITH HYPERGLYCEMIA, WITH LONG-TERM CURRENT USE OF INSULIN (MULTI): ICD-10-CM

## 2024-06-04 RX ORDER — INSULIN GLARGINE 100 [IU]/ML
INJECTION, SOLUTION SUBCUTANEOUS
Qty: 15 ML | Refills: 5 | Status: SHIPPED | OUTPATIENT
Start: 2024-06-04

## 2024-06-06 DIAGNOSIS — Z79.4 TYPE 2 DIABETES MELLITUS WITHOUT COMPLICATION, WITH LONG-TERM CURRENT USE OF INSULIN (MULTI): ICD-10-CM

## 2024-06-06 DIAGNOSIS — E11.9 TYPE 2 DIABETES MELLITUS WITHOUT COMPLICATION, WITH LONG-TERM CURRENT USE OF INSULIN (MULTI): ICD-10-CM

## 2024-06-06 RX ORDER — INSULIN LISPRO 100 [IU]/ML
INJECTION, SOLUTION INTRAVENOUS; SUBCUTANEOUS
Qty: 15 ML | Refills: 11 | Status: SHIPPED | OUTPATIENT
Start: 2024-06-06

## 2024-06-14 ENCOUNTER — APPOINTMENT (OUTPATIENT)
Dept: ENDOCRINOLOGY | Facility: CLINIC | Age: 74
End: 2024-06-14
Payer: COMMERCIAL

## 2024-06-14 ENCOUNTER — LAB (OUTPATIENT)
Dept: LAB | Facility: LAB | Age: 74
End: 2024-06-14
Payer: COMMERCIAL

## 2024-06-14 DIAGNOSIS — Z94.0 KIDNEY REPLACED BY TRANSPLANT (HHS-HCC): ICD-10-CM

## 2024-06-14 DIAGNOSIS — E78.5 HYPERLIPIDEMIA, UNSPECIFIED HYPERLIPIDEMIA TYPE: ICD-10-CM

## 2024-06-14 DIAGNOSIS — T38.0X5A STEROID-INDUCED HYPERGLYCEMIA: ICD-10-CM

## 2024-06-14 DIAGNOSIS — E11.65 TYPE 2 DIABETES MELLITUS WITH HYPERGLYCEMIA, WITH LONG-TERM CURRENT USE OF INSULIN (MULTI): ICD-10-CM

## 2024-06-14 DIAGNOSIS — N18.31 STAGE 3A CHRONIC KIDNEY DISEASE (MULTI): ICD-10-CM

## 2024-06-14 DIAGNOSIS — Z00.5 HBV POSITIVE IN CADAVERIC DONOR: ICD-10-CM

## 2024-06-14 DIAGNOSIS — E78.5 HYPERLIPIDEMIA, UNSPECIFIED HYPERLIPIDEMIA TYPE: Primary | ICD-10-CM

## 2024-06-14 DIAGNOSIS — Z79.4 TYPE 2 DIABETES MELLITUS WITH HYPERGLYCEMIA, WITH LONG-TERM CURRENT USE OF INSULIN (MULTI): ICD-10-CM

## 2024-06-14 DIAGNOSIS — B19.10 HBV POSITIVE IN CADAVERIC DONOR: ICD-10-CM

## 2024-06-14 DIAGNOSIS — R73.9 STEROID-INDUCED HYPERGLYCEMIA: ICD-10-CM

## 2024-06-14 LAB
25(OH)D3 SERPL-MCNC: 34 NG/ML (ref 30–100)
ALBUMIN SERPL BCP-MCNC: 4 G/DL (ref 3.4–5)
ALP SERPL-CCNC: 86 U/L (ref 33–136)
ALT SERPL W P-5'-P-CCNC: 8 U/L (ref 7–45)
ANION GAP SERPL CALC-SCNC: 14 MMOL/L (ref 10–20)
AST SERPL W P-5'-P-CCNC: 13 U/L (ref 9–39)
ATRIAL RATE: 92 BPM
BILIRUB DIRECT SERPL-MCNC: 0.1 MG/DL (ref 0–0.3)
BILIRUB SERPL-MCNC: 0.4 MG/DL (ref 0–1.2)
BUN SERPL-MCNC: 17 MG/DL (ref 6–23)
CALCIUM SERPL-MCNC: 10.2 MG/DL (ref 8.6–10.6)
CHLORIDE SERPL-SCNC: 104 MMOL/L (ref 98–107)
CHOLEST SERPL-MCNC: 170 MG/DL (ref 0–199)
CHOLESTEROL/HDL RATIO: 3
CO2 SERPL-SCNC: 26 MMOL/L (ref 21–32)
CREAT SERPL-MCNC: 0.88 MG/DL (ref 0.5–1.05)
CREAT UR-MCNC: 39.6 MG/DL (ref 20–320)
EGFRCR SERPLBLD CKD-EPI 2021: 70 ML/MIN/1.73M*2
ERYTHROCYTE [DISTWIDTH] IN BLOOD BY AUTOMATED COUNT: 14.7 % (ref 11.5–14.5)
GLUCOSE SERPL-MCNC: 161 MG/DL (ref 74–99)
HBV SURFACE AG SERPL QL IA: NONREACTIVE
HCT VFR BLD AUTO: 41.3 % (ref 36–46)
HDLC SERPL-MCNC: 56 MG/DL
HGB BLD-MCNC: 12.6 G/DL (ref 12–16)
LDLC SERPL CALC-MCNC: 93 MG/DL
MAGNESIUM SERPL-MCNC: 1.73 MG/DL (ref 1.6–2.4)
MCH RBC QN AUTO: 27.9 PG (ref 26–34)
MCHC RBC AUTO-ENTMCNC: 30.5 G/DL (ref 32–36)
MCV RBC AUTO: 92 FL (ref 80–100)
NON HDL CHOLESTEROL: 114 MG/DL (ref 0–149)
NRBC BLD-RTO: 0 /100 WBCS (ref 0–0)
P AXIS: 59 DEGREES
P OFFSET: 210 MS
P ONSET: 143 MS
PHOSPHATE SERPL-MCNC: 4.5 MG/DL (ref 2.5–4.9)
PLATELET # BLD AUTO: 232 X10*3/UL (ref 150–450)
POTASSIUM SERPL-SCNC: 4.4 MMOL/L (ref 3.5–5.3)
PR INTERVAL: 140 MS
PROT SERPL-MCNC: 6.1 G/DL (ref 6.4–8.2)
PROT UR-ACNC: 4 MG/DL (ref 5–24)
PROT/CREAT UR: 0.1 MG/MG CREAT (ref 0–0.17)
PTH-INTACT SERPL-MCNC: 207.8 PG/ML (ref 18.5–88)
Q ONSET: 213 MS
QRS COUNT: 15 BEATS
QRS DURATION: 88 MS
QT INTERVAL: 384 MS
QTC CALCULATION(BAZETT): 474 MS
QTC FREDERICIA: 442 MS
R AXIS: -2 DEGREES
RBC # BLD AUTO: 4.51 X10*6/UL (ref 4–5.2)
SODIUM SERPL-SCNC: 140 MMOL/L (ref 136–145)
T AXIS: 25 DEGREES
T OFFSET: 405 MS
TACROLIMUS BLD-MCNC: 6.3 NG/ML
TRIGL SERPL-MCNC: 103 MG/DL (ref 0–149)
VENTRICULAR RATE: 92 BPM
VLDL: 21 MG/DL (ref 0–40)
WBC # BLD AUTO: 4.7 X10*3/UL (ref 4.4–11.3)

## 2024-06-14 PROCEDURE — 83735 ASSAY OF MAGNESIUM: CPT

## 2024-06-14 PROCEDURE — 84100 ASSAY OF PHOSPHORUS: CPT

## 2024-06-14 PROCEDURE — 84156 ASSAY OF PROTEIN URINE: CPT

## 2024-06-14 PROCEDURE — 85027 COMPLETE CBC AUTOMATED: CPT

## 2024-06-14 PROCEDURE — 95251 CONT GLUC MNTR ANALYSIS I&R: CPT | Performed by: STUDENT IN AN ORGANIZED HEALTH CARE EDUCATION/TRAINING PROGRAM

## 2024-06-14 PROCEDURE — 3044F HG A1C LEVEL LT 7.0%: CPT | Performed by: STUDENT IN AN ORGANIZED HEALTH CARE EDUCATION/TRAINING PROGRAM

## 2024-06-14 PROCEDURE — 1159F MED LIST DOCD IN RCRD: CPT | Performed by: STUDENT IN AN ORGANIZED HEALTH CARE EDUCATION/TRAINING PROGRAM

## 2024-06-14 PROCEDURE — 3060F POS MICROALBUMINURIA REV: CPT | Performed by: STUDENT IN AN ORGANIZED HEALTH CARE EDUCATION/TRAINING PROGRAM

## 2024-06-14 PROCEDURE — 87799 DETECT AGENT NOS DNA QUANT: CPT

## 2024-06-14 PROCEDURE — 99213 OFFICE O/P EST LOW 20 MIN: CPT | Performed by: STUDENT IN AN ORGANIZED HEALTH CARE EDUCATION/TRAINING PROGRAM

## 2024-06-14 PROCEDURE — 82248 BILIRUBIN DIRECT: CPT

## 2024-06-14 PROCEDURE — 80061 LIPID PANEL: CPT

## 2024-06-14 PROCEDURE — 80053 COMPREHEN METABOLIC PANEL: CPT

## 2024-06-14 PROCEDURE — 82570 ASSAY OF URINE CREATININE: CPT

## 2024-06-14 PROCEDURE — 87340 HEPATITIS B SURFACE AG IA: CPT

## 2024-06-14 PROCEDURE — 80197 ASSAY OF TACROLIMUS: CPT

## 2024-06-14 PROCEDURE — 82306 VITAMIN D 25 HYDROXY: CPT

## 2024-06-14 PROCEDURE — 36415 COLL VENOUS BLD VENIPUNCTURE: CPT

## 2024-06-14 PROCEDURE — 83970 ASSAY OF PARATHORMONE: CPT

## 2024-06-14 PROCEDURE — 87517 HEPATITIS B DNA QUANT: CPT

## 2024-06-14 RX ORDER — DAPAGLIFLOZIN 10 MG/1
10 TABLET, FILM COATED ORAL DAILY
Qty: 90 TABLET | Refills: 1 | Status: SHIPPED | OUTPATIENT
Start: 2024-06-14

## 2024-06-14 NOTE — PROGRESS NOTES
73 F PMH: HLD, HTN, ESRD from htn and DM s/p kidney transplan in 2022, diverticulosis      following up for diabetes     on chronic prednisone 5mg daily      Diabetes History  DM diagnosed > 5 years ago   Complications Micro and Macro-ESRD s/p transplant in 2022, neuropathy   Last A1c 7.5% in may 2023    Regimen   farxiga 10mg   ozempic 2mg   lantus 15   Lisorp sliding sacle 2:50 >200     SMBG -Dexcom G6   Hypoglycemia -none      Diet:cutting down on meal portions     Comorbidities and Screening  Eye Exam -4/2023 Mild NPDR   Foot exam -12/8/23   LDL 80  TG 94 lovastatin 40mg   Cr and albuminuria -normal  ACE/ARB-none      PMH; Famhx; Social reviewed and pertinents updated on HPI     Some weight loss   Less appetite   ROS reviewed and is negative except for pertinent findings noted on HPI      Physical Exam  Constitutional:       Appearance: Normal appearance.   Abdominal:      Palpations: Abdomen is soft.   Musculoskeletal:         General: No swelling.   Skin:     General: Skin is warm.      Comments: No lipodystrophy   Neurological:      General: No focal deficit present.      Mental Status: She is alert.   Psychiatric:         Mood and Affect: Mood normal.         Behavior: Behavior normal.         labs and imaging reviewed, pertinent findings listed on HPI and Impression    Problem List Items Addressed This Visit       HLD (hyperlipidemia) - Primary    Relevant Orders    Lipid panel    Steroid-induced hyperglycemia    Type 2 diabetes mellitus with hyperglycemia, with long-term current use of insulin (Multi)    Relevant Medications    semaglutide 2 mg/dose (8 mg/3 mL) pen injector    dapagliflozin propanediol (Farxiga) 10 mg    insulin NPH, Isophane, (HumuLIN N,NovoLIN N) 100 unit/mL (3 mL) injection       CGM reviewed, minimum of 72 hrs of data reviewed, CGM data reviewed to influence glucose treatment plan: 47% in range 53% high no lows   Having postprandial hyperglycemia    -When your lantus runs out you will  switch over to NPH 15 units with the dose of prednisone  -Continue sliding scale with meals   -continue ozempic and farxiga    Follow up next available

## 2024-06-14 NOTE — PATIENT INSTRUCTIONS
-When your lantus runs out you will switch over to NPH 15 units with the dose of prednisone  -Continue sliding scale with meals   -continue ozempic and farxiga    Follow up next available    Brenda Casey MD  Divison of Endocrinology   [facility]   Phone: [phone]    option 4, then option 1  Fax: [phone]

## 2024-06-16 LAB
BKV DNA SERPL NAA+PROBE-LOG#: NORMAL {LOG_COPIES}/ML
CMV DNA SERPL NAA+PROBE-LOG IU: ABNORMAL {LOG_IU}/ML
LABORATORY COMMENT REPORT: ABNORMAL
LABORATORY COMMENT REPORT: NOT DETECTED

## 2024-06-18 LAB
ATRIAL RATE: 92 BPM
P AXIS: 59 DEGREES
P OFFSET: 210 MS
P ONSET: 143 MS
PR INTERVAL: 140 MS
Q ONSET: 213 MS
QRS COUNT: 15 BEATS
QRS DURATION: 88 MS
QT INTERVAL: 384 MS
QTC CALCULATION(BAZETT): 474 MS
QTC FREDERICIA: 442 MS
R AXIS: -2 DEGREES
T AXIS: 25 DEGREES
T OFFSET: 405 MS
VENTRICULAR RATE: 92 BPM

## 2024-06-19 NOTE — PROGRESS NOTES
AUDIOLOGY ADULT AUDIOMETRIC EVALUATION      Name:  Jacklyn Villa   :  1950  Age:  74 y.o.  Date of Evaluation:  2024    Time: 0128-4164    IMPRESSIONS     Essentially normal hearing sensitivity sloping to mild to moderate sensorineural hearing loss in both ears.  Word understanding in quiet is good in both ears.  DPOAE responses absent at all frequencies tested in the right ear, and essentially absent in the left ear.  Tympanometry indicates normal middle ear pressure and tympanic membrane mobility in both ears.     Today's test results are hearing loss requiring medical/otologic and audiologic follow-up.     Amplification needs: Consider amplification if hearing loss begins affecting communication.    RECOMMENDATIONS     Continue medical follow up with primary care provider and/or Ears Nose and Throat (ENT) provider as recommended.  Schedule an evaluation with an Ears Nose and Throat (ENT) provider to address cerumen in right ear and dizziness. For ENT scheduling, call [phone].   Return for audiologic evaluation in conjunction with medical management or annually (whichever is sooner) to monitor hearing sensitivity and assess middle ear status or sooner should concerns arise. The audiology department can be reached at [phone] to schedule an appointment.   Consider amplification pending medical clearance. Check insurance benefit for hearing aid benefits and in-network providers. To schedule a hearing aid consultation with [facility] audiology department, call [phone]. Patient was provided with a copy of today's audiogram.  Avoid exposure to loud sounds by moving away from the noise, turning down the volume, or wearing proper hearing protection correctly.  Consider use of good communication strategies. These include but are not limited to the following: get Jacklyn's attention before speaking to her, close the distance between Jacklyn and who is speaking, limit background  "noise, allow Jacklyn access to visual cues (i.e. facial expressions/mouth movements, pictures, written instructions, etc.). When in situations where background noise cannot be avoided, position yourself so that the background noise is to your back, and you communication partner is seated in front of you, ideally with a quiet area or wall behind them.     HISTORY     Reason for visit:  Ms. Villa is seen today for an initial audiologic evaluation at the request of Lj Ray MD due to concerns for change in hearing in the left ear greater than the right ear. History obtained from patient report and chart review.     Change in Hearing: yes in the left ear greater than the right ear; gradual decrease over time  Difficult listening environments: conversations with background noise (television on in background), she also said she is told that she raises her television volume to a loud level  Tinnitus: denied   Otalgia: yes occasionally, none today   Aural Pressure/Fullness: reports her ears feel clogged when she washes her hair  Recent Ear Infections/Illness: denied  Otorrhea: denied  Dizziness: yes, described as feeling like she is in a cloud going in a [place], lasts for seconds, and occurs infrequently.   History of Ear Surgeries: denied  History of Noise Exposure: yes, occupational noise exposure, and hearing protection was reportedly not worn  Family History of Hearing Loss: Denied  Hearing Aid Use: None  Other Significant History: denied  Falls within the last year: denied    EVALUATION     See scanned audiogram in \"Media\"     TEST RESULTS     Otoscopic Evaluation:  Right Ear: Cerumen which appeared to be non-occluding; however, tympanic membrane could not be visualized. Testing continued given tympanometry results.  Left Ear: Ear canal clear with identifiable cone of light.    Tympanometry (226 Hz):  Right Ear: Type A, middle ear pressure and tympanic membrane compliance within normal limits.   Left Ear: Type A, " middle ear pressure and tympanic membrane compliance within normal limits.     Acoustic Reflexes:   Right Ear: Responses present at expected levels for 500-2000 Hz.  Left Ear: Responses present at expected levels for 500-2000 Hz.    Distortion Product Otoacoustic Emissions:  Right Ear: Absent at all frequencies tested 2987-4459 Hz.  Left Ear:  Essentially absent. Response(s) present at 7449-1541, and 5600 Hz. Responses absent at 0875-9681, 1168-9900 Hz.  Present OAEs suggest normal or near cochlear outer hair cell function for corresponding frequency region(s). Absent OAEs with normal middle ear function can be consistent with some degree of hearing loss. Assessment of cochlear outer hair cell function may be impacted by outer or middle ear function.    Test technique:  Pure Tone Audiometry via insert earphones  Reliability:   good    Pure Tone Audiometry:    Right Ear: Essentially mild sensorineural hearing loss for 125-4000 Hz, sloping to moderate to moderately-severe for 4787-9910 Hz.  Left Ear: Essentially mild sensorineural hearing loss for 125-6000 Hz, sloping to moderate for 8000 Hz.    Speech Audiometry:   Right Ear:  Speech Reception Threshold (SRT) was obtained at 25 dB HL. This is in good agreement with three frequency Pure Tone Average. Word Recognition scores were good (90%) in quiet when words were presented at 65 dB HL. These results are based on [facility] Auditory Test No.6 (NU-6) Ordered by difficulty (N=10).   Left Ear:  Speech Reception Threshold (SRT) was obtained at 25 dB HL. This is in good agreement with three frequency Pure Tone Average. Word Recognition scores were good (90%) in quiet when words were presented at 65 dB HL. These results are based on [facility] Auditory Test No.6 (NU-6) Ordered by difficulty (N=10).     Comparison of today's results with previous test results: No previous results available.         PATIENT EDUCATION     Discussed results and  recommendations with Ms. Villa. Questions were addressed and the patient was encouraged to contact our department at [phone] should concerns arise.     Minerva Nguyen, MARTIR, CCC-A  Licensed Clinical Audiologist    Degree of   Hearing Sensitivity dB Range   Within Normal Limits (WNL) 0 - 20   Slight 25   Mild 26 - 40   Moderate 41 - 55   Moderately-Severe 56 - 70   Severe 71 - 90   Profound 91 +     Key   CHL Conductive Hearing Loss   ECV Ear Canal Volume   HA Hearing Aid   NIHL Noise-Induced Hearing Loss   PTA Pure Tone Average   SNHL Sensorineural Hearing Loss   TM Tympanic Membrane   WNL Within Normal Limits

## 2024-06-20 LAB
HBV DNA SERPL NAA+PROBE-ACNC: NOT DETECTED [IU]/ML
HBV DNA SERPL NAA+PROBE-LOG IU: NORMAL {LOG_IU}/ML

## 2024-06-21 ENCOUNTER — CLINICAL SUPPORT (OUTPATIENT)
Dept: AUDIOLOGY | Facility: HOSPITAL | Age: 74
End: 2024-06-21
Payer: COMMERCIAL

## 2024-06-21 DIAGNOSIS — H90.3 SENSORINEURAL HEARING LOSS (SNHL) OF BOTH EARS: Primary | ICD-10-CM

## 2024-06-21 PROCEDURE — 92557 COMPREHENSIVE HEARING TEST: CPT

## 2024-06-21 PROCEDURE — 92567 TYMPANOMETRY: CPT

## 2024-06-24 PROCEDURE — RXMED WILLOW AMBULATORY MEDICATION CHARGE

## 2024-06-25 ENCOUNTER — PHARMACY VISIT (OUTPATIENT)
Dept: PHARMACY | Facility: CLINIC | Age: 74
End: 2024-06-25
Payer: COMMERCIAL

## 2024-06-26 DIAGNOSIS — I15.9 SECONDARY HYPERTENSION: ICD-10-CM

## 2024-06-26 DIAGNOSIS — I70.1 RENAL ARTERY STENOSIS (CMS-HCC): ICD-10-CM

## 2024-06-26 RX ORDER — AMLODIPINE BESYLATE 5 MG/1
5 TABLET ORAL DAILY
Qty: 30 TABLET | Refills: 2 | Status: SHIPPED | OUTPATIENT
Start: 2024-06-26 | End: 2024-09-24

## 2024-06-26 NOTE — TELEPHONE ENCOUNTER
Pt called requesting refill of [facility]ipine to [facility]. Order pended and routed to provider.

## 2024-07-12 ENCOUNTER — APPOINTMENT (OUTPATIENT)
Dept: OPHTHALMOLOGY | Facility: CLINIC | Age: 74
End: 2024-07-12
Payer: COMMERCIAL

## 2024-07-12 ENCOUNTER — LAB (OUTPATIENT)
Dept: LAB | Facility: LAB | Age: 74
End: 2024-07-12
Payer: COMMERCIAL

## 2024-07-12 DIAGNOSIS — N18.31 STAGE 3A CHRONIC KIDNEY DISEASE (MULTI): ICD-10-CM

## 2024-07-12 DIAGNOSIS — H35.09 RETINAL VASCULAR LESION: Primary | ICD-10-CM

## 2024-07-12 DIAGNOSIS — H34.9 RETINAL VASCULAR OCCLUSION: ICD-10-CM

## 2024-07-12 DIAGNOSIS — Z79.4 TYPE 2 DIABETES MELLITUS WITH HYPERGLYCEMIA, WITH LONG-TERM CURRENT USE OF INSULIN (MULTI): ICD-10-CM

## 2024-07-12 DIAGNOSIS — E11.65 TYPE 2 DIABETES MELLITUS WITH HYPERGLYCEMIA, WITH LONG-TERM CURRENT USE OF INSULIN (MULTI): ICD-10-CM

## 2024-07-12 DIAGNOSIS — H35.053 RETINAL NEOVASCULARIZATION OF BOTH EYES: ICD-10-CM

## 2024-07-12 DIAGNOSIS — Z94.0 KIDNEY REPLACED BY TRANSPLANT (HHS-HCC): ICD-10-CM

## 2024-07-12 DIAGNOSIS — T38.0X5A STEROID-INDUCED HYPERGLYCEMIA: ICD-10-CM

## 2024-07-12 DIAGNOSIS — R73.9 STEROID-INDUCED HYPERGLYCEMIA: ICD-10-CM

## 2024-07-12 DIAGNOSIS — H57.89 SCLERAL INJECTION: ICD-10-CM

## 2024-07-12 LAB
25(OH)D3 SERPL-MCNC: 31 NG/ML (ref 30–100)
ALBUMIN SERPL BCP-MCNC: 4.2 G/DL (ref 3.4–5)
ANION GAP SERPL CALC-SCNC: 13 MMOL/L (ref 10–20)
BUN SERPL-MCNC: 15 MG/DL (ref 6–23)
CALCIUM SERPL-MCNC: 9.9 MG/DL (ref 8.6–10.6)
CHLORIDE SERPL-SCNC: 103 MMOL/L (ref 98–107)
CO2 SERPL-SCNC: 27 MMOL/L (ref 21–32)
CREAT SERPL-MCNC: 0.84 MG/DL (ref 0.5–1.05)
CREAT UR-MCNC: 28.9 MG/DL (ref 20–320)
EGFRCR SERPLBLD CKD-EPI 2021: 74 ML/MIN/1.73M*2
ERYTHROCYTE [DISTWIDTH] IN BLOOD BY AUTOMATED COUNT: 14.9 % (ref 11.5–14.5)
GLUCOSE SERPL-MCNC: 139 MG/DL (ref 74–99)
HCT VFR BLD AUTO: 45.2 % (ref 36–46)
HGB BLD-MCNC: 13.9 G/DL (ref 12–16)
MAGNESIUM SERPL-MCNC: 1.83 MG/DL (ref 1.6–2.4)
MCH RBC QN AUTO: 29.2 PG (ref 26–34)
MCHC RBC AUTO-ENTMCNC: 30.8 G/DL (ref 32–36)
MCV RBC AUTO: 95 FL (ref 80–100)
NRBC BLD-RTO: 0 /100 WBCS (ref 0–0)
PHOSPHATE SERPL-MCNC: 3.7 MG/DL (ref 2.5–4.9)
PLATELET # BLD AUTO: 218 X10*3/UL (ref 150–450)
POTASSIUM SERPL-SCNC: 4.2 MMOL/L (ref 3.5–5.3)
PROT UR-ACNC: 6 MG/DL (ref 5–24)
PROT/CREAT UR: 0.21 MG/MG CREAT (ref 0–0.17)
PTH-INTACT SERPL-MCNC: 245.6 PG/ML (ref 18.5–88)
RBC # BLD AUTO: 4.76 X10*6/UL (ref 4–5.2)
SODIUM SERPL-SCNC: 139 MMOL/L (ref 136–145)
TACROLIMUS BLD-MCNC: 5.1 NG/ML
WBC # BLD AUTO: 4.5 X10*3/UL (ref 4.4–11.3)

## 2024-07-12 PROCEDURE — 87799 DETECT AGENT NOS DNA QUANT: CPT

## 2024-07-12 PROCEDURE — 3044F HG A1C LEVEL LT 7.0%: CPT | Performed by: OPHTHALMOLOGY

## 2024-07-12 PROCEDURE — 80069 RENAL FUNCTION PANEL: CPT

## 2024-07-12 PROCEDURE — 1036F TOBACCO NON-USER: CPT | Performed by: OPHTHALMOLOGY

## 2024-07-12 PROCEDURE — 3061F NEG MICROALBUMINURIA REV: CPT | Performed by: OPHTHALMOLOGY

## 2024-07-12 PROCEDURE — 92134 CPTRZ OPH DX IMG PST SGM RTA: CPT | Performed by: OPHTHALMOLOGY

## 2024-07-12 PROCEDURE — 36415 COLL VENOUS BLD VENIPUNCTURE: CPT

## 2024-07-12 PROCEDURE — 82306 VITAMIN D 25 HYDROXY: CPT

## 2024-07-12 PROCEDURE — 1159F MED LIST DOCD IN RCRD: CPT | Performed by: OPHTHALMOLOGY

## 2024-07-12 PROCEDURE — 80197 ASSAY OF TACROLIMUS: CPT

## 2024-07-12 PROCEDURE — 82570 ASSAY OF URINE CREATININE: CPT

## 2024-07-12 PROCEDURE — 99214 OFFICE O/P EST MOD 30 MIN: CPT | Performed by: OPHTHALMOLOGY

## 2024-07-12 PROCEDURE — 83970 ASSAY OF PARATHORMONE: CPT

## 2024-07-12 PROCEDURE — 3048F LDL-C <100 MG/DL: CPT | Performed by: OPHTHALMOLOGY

## 2024-07-12 PROCEDURE — 85027 COMPLETE CBC AUTOMATED: CPT

## 2024-07-12 PROCEDURE — 84156 ASSAY OF PROTEIN URINE: CPT

## 2024-07-12 PROCEDURE — 83735 ASSAY OF MAGNESIUM: CPT

## 2024-07-12 ASSESSMENT — ENCOUNTER SYMPTOMS
EYES NEGATIVE: 1
ENDOCRINE NEGATIVE: 1

## 2024-07-12 ASSESSMENT — CONF VISUAL FIELD
OD_SUPERIOR_TEMPORAL_RESTRICTION: 0
OS_SUPERIOR_NASAL_RESTRICTION: 0
OD_INFERIOR_NASAL_RESTRICTION: 0
OS_SUPERIOR_TEMPORAL_RESTRICTION: 0
OD_NORMAL: 1
OD_SUPERIOR_NASAL_RESTRICTION: 0
OD_INFERIOR_TEMPORAL_RESTRICTION: 0
OS_INFERIOR_TEMPORAL_RESTRICTION: 0
OS_INFERIOR_NASAL_RESTRICTION: 0
OS_NORMAL: 1

## 2024-07-12 ASSESSMENT — TONOMETRY
OD_IOP_MMHG: 16
IOP_METHOD: GOLDMANN APPLANATION
OS_IOP_MMHG: 16

## 2024-07-12 ASSESSMENT — EXTERNAL EXAM - RIGHT EYE: OD_EXAM: NORMAL

## 2024-07-12 ASSESSMENT — EXTERNAL EXAM - LEFT EYE: OS_EXAM: NORMAL

## 2024-07-12 ASSESSMENT — VISUAL ACUITY
METHOD: SNELLEN - LINEAR
OS_CC: 20/25
OD_CC: 20/25

## 2024-07-12 ASSESSMENT — CUP TO DISC RATIO
OD_RATIO: .3
OS_RATIO: .3

## 2024-07-12 ASSESSMENT — SLIT LAMP EXAM - LIDS
COMMENTS: GOOD POSITION
COMMENTS: GOOD POSITION

## 2024-07-12 NOTE — PROGRESS NOTES
Assessment/Plan   Diagnoses and all orders for this visit:  Retinal vascular lesion  -     OCT, Retina - OU - Both Eyes  Type 2 diabetes mellitus with hyperglycemia, with long-term current use of insulin (Multi)  Steroid-induced hyperglycemia  Scleral injection  Retinal vascular occlusion  Retinal neovascularization of both eyes           TODAY   (OU)   - OCT Macula By:Nick Campoverde    History    Retinal vascular [id number] left macula has cme new    - Fibrovascular stalk extending into vitreous at 3:00 near equator in the left eye    - Possibly NVE but associated with additional tissue not typical of diabetic NVE or vitreous traction              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      - The patient was advised to maintain tight glucose control, tight blood pressure control, and favorable levels of cholesterol, low density lipoprotein, and high density lipoproteins.       rcent ED admission for hyperglycemia    Combined form of age-related cataract, both eyesH25.813    Saw Dr Shea; wait for surgery                   Peripheral ischemia OS with NVE     schemia with vascular proliferation at 3 o clock. Per pt no hx of sickle cell or sickle cell trait in herself or family. hx DM but retinopathy overall mild.     Prior FA showed peripheral nonperufsion with vascular remodeling.       Sp Laser photocoagulation OU    Good laser uptake and coverage OU          She has had enough diabetic disease to warrant a renal transplant. In this situation, probably wise to consider diabetes itself as the  of the ischemia.              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

## 2024-07-14 LAB
BKV DNA SERPL NAA+PROBE-LOG#: NORMAL {LOG_COPIES}/ML
LABORATORY COMMENT REPORT: NOT DETECTED

## 2024-07-18 PROCEDURE — RXMED WILLOW AMBULATORY MEDICATION CHARGE

## 2024-07-20 ENCOUNTER — PHARMACY VISIT (OUTPATIENT)
Dept: PHARMACY | Facility: CLINIC | Age: 74
End: 2024-07-20
Payer: COMMERCIAL

## 2024-07-25 ENCOUNTER — PATIENT OUTREACH (OUTPATIENT)
Dept: CARE COORDINATION | Facility: CLINIC | Age: 74
End: 2024-07-25
Payer: COMMERCIAL

## 2024-07-25 NOTE — PROGRESS NOTES
Outreach call to patient to follow up after hospital discharge. Unable to connect with patient.  Will dis enroll patient from transitions of care.     CLARITA Aguirre, RN   [phone]   ACO Department

## 2024-08-05 ENCOUNTER — LAB (OUTPATIENT)
Dept: LAB | Facility: LAB | Age: 74
End: 2024-08-05
Payer: COMMERCIAL

## 2024-08-05 DIAGNOSIS — Z94.0 KIDNEY REPLACED BY TRANSPLANT (HHS-HCC): ICD-10-CM

## 2024-08-05 DIAGNOSIS — N18.31 STAGE 3A CHRONIC KIDNEY DISEASE (MULTI): ICD-10-CM

## 2024-08-05 DIAGNOSIS — Z94.0 KIDNEY TRANSPLANT STATUS (HHS-HCC): Primary | ICD-10-CM

## 2024-08-05 DIAGNOSIS — Z00.5 ENCOUNTER FOR EXAMINATION OF POTENTIAL DONOR OF ORGAN AND TISSUE: ICD-10-CM

## 2024-08-05 LAB
25(OH)D3 SERPL-MCNC: 38 NG/ML (ref 30–100)
ALBUMIN SERPL BCP-MCNC: 3.7 G/DL (ref 3.4–5)
ANION GAP SERPL CALC-SCNC: 13 MMOL/L (ref 10–20)
BUN SERPL-MCNC: 14 MG/DL (ref 6–23)
CALCIUM SERPL-MCNC: 10.2 MG/DL (ref 8.6–10.6)
CHLORIDE SERPL-SCNC: 104 MMOL/L (ref 98–107)
CO2 SERPL-SCNC: 29 MMOL/L (ref 21–32)
CREAT SERPL-MCNC: 0.87 MG/DL (ref 0.5–1.05)
CREAT UR-MCNC: 159.5 MG/DL (ref 20–320)
EGFRCR SERPLBLD CKD-EPI 2021: 71 ML/MIN/1.73M*2
ERYTHROCYTE [DISTWIDTH] IN BLOOD BY AUTOMATED COUNT: 14.4 % (ref 11.5–14.5)
GLUCOSE SERPL-MCNC: 151 MG/DL (ref 74–99)
HCT VFR BLD AUTO: 42.1 % (ref 36–46)
HGB BLD-MCNC: 13 G/DL (ref 12–16)
MAGNESIUM SERPL-MCNC: 1.76 MG/DL (ref 1.6–2.4)
MCH RBC QN AUTO: 27.5 PG (ref 26–34)
MCHC RBC AUTO-ENTMCNC: 30.9 G/DL (ref 32–36)
MCV RBC AUTO: 89 FL (ref 80–100)
NRBC BLD-RTO: 0 /100 WBCS (ref 0–0)
PHOSPHATE SERPL-MCNC: 3.6 MG/DL (ref 2.5–4.9)
PLATELET # BLD AUTO: 289 X10*3/UL (ref 150–450)
POTASSIUM SERPL-SCNC: 4.1 MMOL/L (ref 3.5–5.3)
PROT UR-ACNC: 31 MG/DL (ref 5–24)
PROT/CREAT UR: 0.19 MG/MG CREAT (ref 0–0.17)
PTH-INTACT SERPL-MCNC: 212.6 PG/ML (ref 18.5–88)
RBC # BLD AUTO: 4.73 X10*6/UL (ref 4–5.2)
SODIUM SERPL-SCNC: 142 MMOL/L (ref 136–145)
TACROLIMUS BLD-MCNC: 4.1 NG/ML
WBC # BLD AUTO: 5 X10*3/UL (ref 4.4–11.3)

## 2024-08-05 PROCEDURE — 80197 ASSAY OF TACROLIMUS: CPT

## 2024-08-05 PROCEDURE — 85027 COMPLETE CBC AUTOMATED: CPT

## 2024-08-05 PROCEDURE — 36415 COLL VENOUS BLD VENIPUNCTURE: CPT

## 2024-08-05 PROCEDURE — 87799 DETECT AGENT NOS DNA QUANT: CPT

## 2024-08-05 PROCEDURE — 84156 ASSAY OF PROTEIN URINE: CPT

## 2024-08-05 PROCEDURE — 87517 HEPATITIS B DNA QUANT: CPT

## 2024-08-05 PROCEDURE — 83970 ASSAY OF PARATHORMONE: CPT

## 2024-08-05 PROCEDURE — 80069 RENAL FUNCTION PANEL: CPT

## 2024-08-05 PROCEDURE — 82570 ASSAY OF URINE CREATININE: CPT

## 2024-08-05 PROCEDURE — 83735 ASSAY OF MAGNESIUM: CPT

## 2024-08-05 PROCEDURE — 82306 VITAMIN D 25 HYDROXY: CPT

## 2024-08-06 LAB
BKV DNA SERPL NAA+PROBE-LOG#: NORMAL {LOG_COPIES}/ML
LABORATORY COMMENT REPORT: NOT DETECTED

## 2024-08-12 ENCOUNTER — TELEPHONE (OUTPATIENT)
Dept: TRANSPLANT | Facility: HOSPITAL | Age: 74
End: 2024-08-12
Payer: COMMERCIAL

## 2024-08-12 DIAGNOSIS — Z94.0 KIDNEY REPLACED BY TRANSPLANT (HHS-HCC): ICD-10-CM

## 2024-08-13 RX ORDER — TACROLIMUS 1 MG/1
1 CAPSULE ORAL EVERY 12 HOURS
Qty: 60 CAPSULE | Refills: 11 | Status: SHIPPED | OUTPATIENT
Start: 2024-08-13 | End: 2025-08-13

## 2024-08-13 RX ORDER — MYCOPHENOLATE MOFETIL 250 MG/1
250 CAPSULE ORAL EVERY 12 HOURS
Qty: 60 CAPSULE | Refills: 11 | Status: SHIPPED | OUTPATIENT
Start: 2024-08-13 | End: 2025-08-13

## 2024-08-13 RX ORDER — TACROLIMUS 0.5 MG/1
0.5 CAPSULE ORAL EVERY 12 HOURS
Qty: 60 CAPSULE | Refills: 11 | Status: SHIPPED | OUTPATIENT
Start: 2024-08-13 | End: 2025-08-13

## 2024-09-23 ENCOUNTER — LAB (OUTPATIENT)
Dept: LAB | Facility: LAB | Age: 74
End: 2024-09-23
Payer: COMMERCIAL

## 2024-09-23 ENCOUNTER — OFFICE VISIT (OUTPATIENT)
Dept: TRANSPLANT | Facility: HOSPITAL | Age: 74
End: 2024-09-23
Payer: COMMERCIAL

## 2024-09-23 VITALS
HEART RATE: 92 BPM | SYSTOLIC BLOOD PRESSURE: 166 MMHG | TEMPERATURE: 97.8 F | OXYGEN SATURATION: 97 % | WEIGHT: 199.6 LBS | DIASTOLIC BLOOD PRESSURE: 73 MMHG | BODY MASS INDEX: 33.22 KG/M2

## 2024-09-23 DIAGNOSIS — I70.1 RENAL ARTERY STENOSIS (CMS-HCC): ICD-10-CM

## 2024-09-23 DIAGNOSIS — Z94.0 KIDNEY REPLACED BY TRANSPLANT (HHS-HCC): ICD-10-CM

## 2024-09-23 DIAGNOSIS — Z79.899 ENCOUNTER FOR LONG-TERM (CURRENT) USE OF HIGH-RISK MEDICATION: ICD-10-CM

## 2024-09-23 DIAGNOSIS — Z48.298 AFTERCARE FOLLOWING ORGAN TRANSPLANT: Primary | ICD-10-CM

## 2024-09-23 DIAGNOSIS — E55.9 VITAMIN D DEFICIENCY: ICD-10-CM

## 2024-09-23 DIAGNOSIS — I15.9 SECONDARY HYPERTENSION: ICD-10-CM

## 2024-09-23 DIAGNOSIS — N18.31 STAGE 3A CHRONIC KIDNEY DISEASE (MULTI): ICD-10-CM

## 2024-09-23 LAB
25(OH)D3 SERPL-MCNC: 49 NG/ML (ref 30–100)
ALBUMIN SERPL BCP-MCNC: 4.1 G/DL (ref 3.4–5)
ANION GAP SERPL CALC-SCNC: 13 MMOL/L (ref 10–20)
BUN SERPL-MCNC: 17 MG/DL (ref 6–23)
CALCIUM SERPL-MCNC: 9.9 MG/DL (ref 8.6–10.6)
CHLORIDE SERPL-SCNC: 107 MMOL/L (ref 98–107)
CO2 SERPL-SCNC: 26 MMOL/L (ref 21–32)
CREAT SERPL-MCNC: 0.97 MG/DL (ref 0.5–1.05)
CREAT UR-MCNC: 110 MG/DL (ref 20–320)
EGFRCR SERPLBLD CKD-EPI 2021: 62 ML/MIN/1.73M*2
ERYTHROCYTE [DISTWIDTH] IN BLOOD BY AUTOMATED COUNT: 15 % (ref 11.5–14.5)
GLUCOSE SERPL-MCNC: 157 MG/DL (ref 74–99)
HCT VFR BLD AUTO: 44.5 % (ref 36–46)
HGB BLD-MCNC: 13.4 G/DL (ref 12–16)
MAGNESIUM SERPL-MCNC: 1.94 MG/DL (ref 1.6–2.4)
MCH RBC QN AUTO: 27.6 PG (ref 26–34)
MCHC RBC AUTO-ENTMCNC: 30.1 G/DL (ref 32–36)
MCV RBC AUTO: 92 FL (ref 80–100)
NRBC BLD-RTO: 0 /100 WBCS (ref 0–0)
PHOSPHATE SERPL-MCNC: 3.6 MG/DL (ref 2.5–4.9)
PLATELET # BLD AUTO: 272 X10*3/UL (ref 150–450)
POTASSIUM SERPL-SCNC: 4.6 MMOL/L (ref 3.5–5.3)
PROT UR-ACNC: 21 MG/DL (ref 5–24)
PROT/CREAT UR: 0.19 MG/MG CREAT (ref 0–0.17)
PTH-INTACT SERPL-MCNC: 172.9 PG/ML (ref 18.5–88)
RBC # BLD AUTO: 4.85 X10*6/UL (ref 4–5.2)
SODIUM SERPL-SCNC: 141 MMOL/L (ref 136–145)
TACROLIMUS BLD-MCNC: 4.3 NG/ML
WBC # BLD AUTO: 5.3 X10*3/UL (ref 4.4–11.3)

## 2024-09-23 PROCEDURE — 80069 RENAL FUNCTION PANEL: CPT

## 2024-09-23 PROCEDURE — 3078F DIAST BP <80 MM HG: CPT | Performed by: INTERNAL MEDICINE

## 2024-09-23 PROCEDURE — 3060F POS MICROALBUMINURIA REV: CPT | Performed by: INTERNAL MEDICINE

## 2024-09-23 PROCEDURE — 1126F AMNT PAIN NOTED NONE PRSNT: CPT | Performed by: INTERNAL MEDICINE

## 2024-09-23 PROCEDURE — 80197 ASSAY OF TACROLIMUS: CPT

## 2024-09-23 PROCEDURE — 83970 ASSAY OF PARATHORMONE: CPT

## 2024-09-23 PROCEDURE — 84156 ASSAY OF PROTEIN URINE: CPT

## 2024-09-23 PROCEDURE — 3044F HG A1C LEVEL LT 7.0%: CPT | Performed by: INTERNAL MEDICINE

## 2024-09-23 PROCEDURE — 83735 ASSAY OF MAGNESIUM: CPT

## 2024-09-23 PROCEDURE — 87799 DETECT AGENT NOS DNA QUANT: CPT

## 2024-09-23 PROCEDURE — 82306 VITAMIN D 25 HYDROXY: CPT

## 2024-09-23 PROCEDURE — 99215 OFFICE O/P EST HI 40 MIN: CPT | Performed by: INTERNAL MEDICINE

## 2024-09-23 PROCEDURE — 82570 ASSAY OF URINE CREATININE: CPT

## 2024-09-23 PROCEDURE — 3048F LDL-C <100 MG/DL: CPT | Performed by: INTERNAL MEDICINE

## 2024-09-23 PROCEDURE — 85027 COMPLETE CBC AUTOMATED: CPT

## 2024-09-23 PROCEDURE — 36415 COLL VENOUS BLD VENIPUNCTURE: CPT

## 2024-09-23 PROCEDURE — 3077F SYST BP >= 140 MM HG: CPT | Performed by: INTERNAL MEDICINE

## 2024-09-23 RX ORDER — MYCOPHENOLATE MOFETIL 250 MG/1
500 CAPSULE ORAL EVERY 12 HOURS
Qty: 360 CAPSULE | Refills: 3 | Status: SHIPPED | OUTPATIENT
Start: 2024-09-23 | End: 2024-09-25 | Stop reason: WASHOUT

## 2024-09-23 RX ORDER — HYDRALAZINE HYDROCHLORIDE 100 MG/1
100 TABLET, FILM COATED ORAL 3 TIMES DAILY
Qty: 270 TABLET | Refills: 3 | Status: SHIPPED | OUTPATIENT
Start: 2024-09-23 | End: 2025-09-23

## 2024-09-23 RX ORDER — AMLODIPINE BESYLATE 5 MG/1
5 TABLET ORAL DAILY
Qty: 90 TABLET | Refills: 3 | Status: SHIPPED | OUTPATIENT
Start: 2024-09-23 | End: 2025-09-23

## 2024-09-23 RX ORDER — PREDNISONE 5 MG/1
5 TABLET ORAL DAILY
Qty: 90 TABLET | Refills: 3 | Status: SHIPPED | OUTPATIENT
Start: 2024-09-23 | End: 2025-09-23

## 2024-09-23 SDOH — ECONOMIC STABILITY: FOOD INSECURITY: WITHIN THE PAST 12 MONTHS, THE FOOD YOU BOUGHT JUST DIDN'T LAST AND YOU DIDN'T HAVE MONEY TO GET MORE.: NEVER TRUE

## 2024-09-23 SDOH — ECONOMIC STABILITY: FOOD INSECURITY: WITHIN THE PAST 12 MONTHS, YOU WORRIED THAT YOUR FOOD WOULD RUN OUT BEFORE YOU GOT MONEY TO BUY MORE.: NEVER TRUE

## 2024-09-23 ASSESSMENT — PAIN SCALES - GENERAL: PAINLEVEL: 0-NO PAIN

## 2024-09-23 NOTE — PROGRESS NOTES
TRANSPLANT NEPHROLOGY :   OUTPATIENT CLINIC NOTE      SERVICE DATE : 09/23/2024     REASON FOR VISIT/CHIEF COMPLAINT:  S/P  TRANSPLANT SURGERY  IMMUNOSUPPRESSIVE MEDICATION MANAGEMENT  BLOOD PRESSURE MANAGEMENT    HPI:    Ms. Villa is a 74 y.o. female with past medical history significant for SRD secondary to diabetes mellitus and hypertension who is s/p DDKT on 8/13/2022 (Dr. Rivera, KDPI 78%, PRA 96%. HCV -/ R -, HBcAb D+/R HBsAb+ (on Vemlidy, never seroconverted), CMV D+/R+, EBV D+ / R-). Left donor kidney, transplanted to patient right pelvis. H/o DGF post operatively needing dialysis once due to hyperkalemia with eventual renal recovery. Other PMH: hyperlipidemia, diverticulosis     Post-txp course notable for CMV viremia. Recent CMV PCR below quantification threshold 6/2024.     Admitted 5/9/2024 for uncontrolled diabetes mellitus with mild DKA, UTI (Klebsiella pneumonia/variicola), SHWETA.     Now doing much better. Last seen 5/2024 in txp clinic.     Reports occ elevated blood sugars. Concerned about insulin efficacy lakhwinder after recent power outage caused the vial to become 'cloudy'. Plans to refill insulin ahead of schedule.    Denied chest pain, SOB, DORADO, Palpitation. Normal urination and bowel movement. Normal gait and no weakness of arms/legs. No cough, runny nose, sore throat, cold symptoms, or rash. No hearing loss. Normal vision.No problems with his sleep, mood and function. No recent infection, hospitalization, surgery or ER visits.      ROS:  Review of  14 systems was performed system by system. See HPI. Otherwise, the symptoms were negative.    PAST MEDICAL HISTORY:  Past Medical History:   Diagnosis Date    Asymptomatic menopausal state 06/30/2021    Post-menopausal    Chronic kidney disease, stage 3 unspecified (Multi) 09/16/2022    CKD (chronic kidney disease), stage III    Chronic kidney disease, stage 4 (severe) (Multi) 10/17/2019    CKD (chronic kidney disease), stage IV    Combined forms  of age-related cataract, left eye 07/08/2020    Combined form of age-related cataract, left eye    Combined forms of age-related cataract, right eye 07/08/2020    Combined form of age-related cataract, right eye    Dry eyes     Encounter for gynecological examination (general) (routine) without abnormal findings 11/08/2021    Well female exam with routine gynecological exam    Encounter for immunization 02/09/2017    Need for diphtheria-tetanus-pertussis (Tdap) vaccine    Encounter for immunization 02/09/2017    Need for shingles vaccine    Encounter for other preprocedural examination 09/28/2021    Pre-transplant evaluation for kidney transplant    Encounter for screening for malignant neoplasm of cervix 05/21/2018    Screening for cervical cancer    Encounter for screening for malignant neoplasm of colon 11/21/2019    Screening for colon cancer    Encounter for screening for respiratory tuberculosis 01/21/2021    Screening for tuberculosis    Impacted cerumen, right ear 02/11/2016    Impacted cerumen of right ear    Need for assistance at home and no other household member able to render care     Need for home health care    Nonproliferative diabetic retinopathy of both eyes (Multi)     Personal history of other diseases of the circulatory system     History of hypertension    Personal history of other diseases of the female genital tract 05/21/2018    History of vaginal discharge    Personal history of other diseases of the nervous system and sense organs 01/28/2020    History of viral conjunctivitis    Personal history of other diseases of the nervous system and sense organs 02/11/2016    History of impacted cerumen    Personal history of other diseases of the nervous system and sense organs 02/11/2016    History of impacted cerumen    Personal history of other diseases of the respiratory system 01/28/2020    History of paranasal sinus congestion    Personal history of other drug therapy 11/09/2017    History of  influenza vaccination    Personal history of other endocrine, nutritional and metabolic disease 08/19/2022    History of diabetes mellitus    Personal history of other endocrine, nutritional and metabolic disease 09/09/2022    History of hyperlipidemia    Personal history of other endocrine, nutritional and metabolic disease 06/30/2021    History of hyperlipidemia    Personal history of other medical treatment 06/29/2021    History of screening mammography    Personal history of other medical treatment 09/12/2019    History of screening mammography    Retinal ischemia     Type 1 diabetes mellitus with hypoglycemia with coma (Multi) 05/27/2022    Type 1 diabetes mellitus with hypoglycemic coma    Type 2 diabetes mellitus without complications (Multi) 01/16/2018    Diabetes mellitus type 2 without retinopathy    Type 2 DM w/mild nonproliferative diabetic retinop w/macular edema (Multi)         PAST SURGICAL HISTORY:  Past Surgical History:   Procedure Laterality Date    OTHER SURGICAL HISTORY  09/15/2022    Kidney transplantation    OTHER SURGICAL HISTORY  06/05/2020    Colonoscopy    OTHER SURGICAL HISTORY  06/05/2020    Arteriovenous fistula creation procedure    OTHER SURGICAL HISTORY  10/17/2019    Biopsy Renal        SOCIAL HISTORY:  Social History     Socioeconomic History    Marital status: Single     Spouse name: Not on file    Number of children: Not on file    Years of education: Not on file    Highest education level: Not on file   Occupational History    Not on file   Tobacco Use    Smoking status: Former     Types: Cigarettes    Smokeless tobacco: Never   Vaping Use    Vaping status: Never Used   Substance and Sexual Activity    Alcohol use: Not Currently    Drug use: Never    Sexual activity: Not on file   Other Topics Concern    Not on file   Social History Narrative    Not on file     Social Determinants of Health     Financial Resource Strain: Low Risk  (5/8/2024)    Overall Financial Resource Strain  (CARDIA)     Difficulty of Paying Living Expenses: Not hard at all   Food Insecurity: No Food Insecurity (9/23/2024)    Hunger Vital Sign     Worried About Running Out of Food in the Last Year: Never true     Ran Out of Food in the Last Year: Never true   Transportation Needs: No Transportation Needs (5/8/2024)    PRAPARE - Transportation     Lack of Transportation (Medical): No     Lack of Transportation (Non-Medical): No   Physical Activity: Not on file   Stress: Not on file   Social Connections: Not on file   Intimate Partner Violence: Not on file   Housing Stability: Low Risk  (5/8/2024)    Housing Stability Vital Sign     Unable to Pay for Housing in the Last Year: No     Number of Places Lived in the Last Year: 1     Unstable Housing in the Last Year: No       FAMILY HISTORY:  Family History   Problem Relation Name Age of Onset    Ovarian cancer Mother      Hypertension Sister         MEDICATION LIST:  Current Outpatient Medications   Medication Instructions    amLODIPine (NORVASC) 5 mg, oral, Daily    ammonium lactate (Lac-Hydrin) 12 % lotion Topical, As needed    aspirin 325 mg, oral, Daily    carvedilol (COREG) 50 mg, oral, 2 times daily    clindamycin (Cleocin T) 1 % lotion Clindamycin Phosphate 1 % External Lotion apply topically to affected areas of the face twice daily Quantity: 60 Refills: 0 Ordered: 22-Feb-2023 DO Start : 22-Feb-2023 Active    dapagliflozin propanediol (FARXIGA) 10 mg, oral, Daily    Dexcom G7 Sensor device Change sensor every 10 days    hydrALAZINE (APRESOLINE) 100 mg, oral, 3 times daily    insulin lispro (HumaLOG) 100 unit/mL injection Inject 8 units with meals 3 times a day and sliding scale as directed , expect up to 40 units daily    insulin NPH, Isophane, (HumuLIN N,NovoLIN N) 100 unit/mL (3 mL) injection 15 units with your morning dose of prednisone    ketoconazole (NIZOral) 2 % shampoo Ketoconazole 2 % External Shampoo Quantity: 120 Refills: 0 Ordered: 17-Feb-2023 DO Start :  "17-Feb-2023 Active    lovastatin (MEVACOR) 40 mg, oral, Nightly    magnesium oxide (MAG-OX) 400 mg, oral, Daily    mycophenolate (CELLCEPT) 250 mg, oral, Every 12 hours    pen needle, diabetic 32 gauge x 3/16\" needle USE AS DIRECTED TO INJECT INSULIN FOUR TIMES DAILY    predniSONE (Deltasone) 5 mg tablet TAKE ONE (1) TABLET BY MOUTH ONCE DAILY.    semaglutide 2 mg/dose (8 mg/3 mL) pen injector INJECT 2MG UNDER THE SKIN ONCE WEEKLY AS DIRECTED.    tacrolimus (PROGRAF) 0.5 mg, oral, Every 12 hours    tacrolimus (PROGRAF) 1 mg, oral, Every 12 hours       ALLERGY  Allergies   Allergen Reactions    Adhesive Tape-Silicones Other     Blistering       PHYSICAL EXAM:    Visit Vitals  /78   Pulse 92   Temp 36.6 °C (97.8 °F) (Temporal)   Wt 90.5 kg (199 lb 9.6 oz)   SpO2 97%   BMI 33.22 kg/m²   OB Status Postmenopausal   Smoking Status Former   BSA 2.04 m²          General Appearance - NAD, A&Ox3   HEENT - Supple. Not pale. No jaundice. No JVD or LAD  CVS - RRR. Normal S1/S2. No murmur, rub or gallop  Lungs- clear to auscultation bilaterally  Abdomen - soft , NT, ND, no guarding. No hepatosplenomegaly. No allograft tenderness  Musculoskeletal /Extremities - no edema. Full ROM. No joint tenderness.   Neuro/Psych - appropriate mood and affect. Motor power V/V all extremities. CN I -XII were grossly intact.  Skin - No visible rash      LABS:    Lab Results   Component Value Date    CREATININE 0.87 08/05/2024    BUN 14 08/05/2024     08/05/2024    K 4.1 08/05/2024     08/05/2024    CO2 29 08/05/2024     Lab Results   Component Value Date    .6 (H) 08/05/2024    CALCIUM 10.2 08/05/2024    PHOS 3.6 08/05/2024     Lab Results   Component Value Date    WBC 5.0 08/05/2024    HGB 13.0 08/05/2024    HCT 42.1 08/05/2024    MCV 89 08/05/2024     08/05/2024     Lab Results   Component Value Date    IRON 74 08/14/2022    TIBC 190 (L) 08/14/2022    FERRITIN 302 (H) 10/22/2018     Lab Results   Component Value " Date    TACROLIMUS 4.1 08/05/2024    CMVPCRIU 36 (H) 10/20/2023    BKVIRPCRQN NOT DETECTED 07/17/2023     Lab Results   Component Value Date    CMVDNAPCR <35 Detected (A) 06/14/2024    BKVDNAPCR Not Detected 08/05/2024         ASSESSMENT AND PLAN:    Ms. Villa is a 74 y.o. female  who is here for follow up s/p kidney transplant.    TRANSPLANT DATE: 8/13/2022 (Kidney)      1. ESRD S/P kidney transplant   - Creatinine last check was 0.87, stable allograft function  -Random urine protein/creatinine ratio is 0.19 g/g, improved. On dapa.  -Allosure last check was 0.09% 3/2024  -Ensure adequate hydration  - Avoid nephrotoxic medications, NSAIDs, and IV contrast.    2. Immunosuppression  -Tacrolimus level last check was 4.1, goal 5-8. Will wait for rpt drawn today.  -incr MMF  to 500 mg q12, cont Pred 5 mg/d   - EBV, BK PCR neg. H/o CMV viremia. Recent CMV PCR below quant threshold    3. Electrolytes  -Acceptable from last lab drawn    4. Hypertension  - office BP elevated today. Home Bps acceptable. Pt has not taken her AM meds yet.  - no hypervolemia on exma.   - Pt to monitor home Bp and call if trends concerning.   - no changes to meds today    5. Bone Mineral Disease/Osteoporosis  - Ca, phos acceptable.  8/2024, vit D 38.   - Consider DEXA every 2-3 years , defer to PCP    6.Anemia/Leukopenia:  - Stable Hb, WBC.    7.Health maintenance and vaccination  - Flu shot during flu season annually  - Cancer screening is up to date per the patient    Lab : Routine transplant lab ( CBC, RFP, and anti-rejection trough level ) every 1 months  Additional labs:  VIT D, PTH Q3 months  Viral screening PCR, Allosure and UPC per protocol.    RTC 4 month(s)

## 2024-09-24 LAB
BKV DNA SERPL NAA+PROBE-LOG#: NORMAL {LOG_COPIES}/ML
LABORATORY COMMENT REPORT: NOT DETECTED

## 2024-09-25 ENCOUNTER — TELEPHONE (OUTPATIENT)
Dept: TRANSPLANT | Facility: HOSPITAL | Age: 74
End: 2024-09-25
Payer: COMMERCIAL

## 2024-09-25 DIAGNOSIS — Z94.0 KIDNEY REPLACED BY TRANSPLANT (HHS-HCC): ICD-10-CM

## 2024-09-25 DIAGNOSIS — E55.9 VITAMIN D DEFICIENCY: ICD-10-CM

## 2024-09-25 RX ORDER — MYCOPHENOLATE MOFETIL 500 MG/1
500 TABLET ORAL 2 TIMES DAILY
Qty: 180 TABLET | Refills: 3 | Status: SHIPPED | OUTPATIENT
Start: 2024-09-25 | End: 2025-09-25

## 2024-09-25 NOTE — TELEPHONE ENCOUNTER
Pt was in clinic on 9/23 amd was suppose to get a refill of 500 caps for her MMF not 250. Looking to have this adjusted

## 2024-09-30 ENCOUNTER — TELEPHONE (OUTPATIENT)
Dept: TRANSPLANT | Facility: HOSPITAL | Age: 74
End: 2024-09-30
Payer: COMMERCIAL

## 2024-09-30 DIAGNOSIS — Z94.0 KIDNEY REPLACED BY TRANSPLANT (HHS-HCC): ICD-10-CM

## 2024-09-30 NOTE — TELEPHONE ENCOUNTER
Patient called requesting to speak with coordinator about the allosure.  Patient call back number is   [phone]

## 2024-10-08 ENCOUNTER — LAB (OUTPATIENT)
Dept: LAB | Facility: LAB | Age: 74
End: 2024-10-08
Payer: COMMERCIAL

## 2024-10-08 DIAGNOSIS — E55.9 VITAMIN D DEFICIENCY: ICD-10-CM

## 2024-10-08 DIAGNOSIS — Z94.0 KIDNEY REPLACED BY TRANSPLANT (HHS-HCC): ICD-10-CM

## 2024-10-08 LAB
25(OH)D3 SERPL-MCNC: 52 NG/ML (ref 30–100)
ALBUMIN SERPL BCP-MCNC: 4.3 G/DL (ref 3.4–5)
ANION GAP SERPL CALC-SCNC: 16 MMOL/L (ref 10–20)
BUN SERPL-MCNC: 14 MG/DL (ref 6–23)
CALCIUM SERPL-MCNC: 10.1 MG/DL (ref 8.6–10.6)
CHLORIDE SERPL-SCNC: 105 MMOL/L (ref 98–107)
CO2 SERPL-SCNC: 22 MMOL/L (ref 21–32)
CREAT SERPL-MCNC: 1.02 MG/DL (ref 0.5–1.05)
EGFRCR SERPLBLD CKD-EPI 2021: 59 ML/MIN/1.73M*2
GLUCOSE SERPL-MCNC: 148 MG/DL (ref 74–99)
MAGNESIUM SERPL-MCNC: 2.26 MG/DL (ref 1.6–2.4)
PHOSPHATE SERPL-MCNC: 3.4 MG/DL (ref 2.5–4.9)
POTASSIUM SERPL-SCNC: 4 MMOL/L (ref 3.5–5.3)
PTH-INTACT SERPL-MCNC: 155.5 PG/ML (ref 18.5–88)
SODIUM SERPL-SCNC: 139 MMOL/L (ref 136–145)
TACROLIMUS BLD-MCNC: 3.5 NG/ML

## 2024-10-08 PROCEDURE — 82306 VITAMIN D 25 HYDROXY: CPT

## 2024-10-08 PROCEDURE — 83970 ASSAY OF PARATHORMONE: CPT

## 2024-10-08 PROCEDURE — 80197 ASSAY OF TACROLIMUS: CPT

## 2024-10-08 PROCEDURE — 83735 ASSAY OF MAGNESIUM: CPT

## 2024-10-08 PROCEDURE — 87799 DETECT AGENT NOS DNA QUANT: CPT

## 2024-10-08 PROCEDURE — 36415 COLL VENOUS BLD VENIPUNCTURE: CPT

## 2024-10-08 PROCEDURE — 80069 RENAL FUNCTION PANEL: CPT

## 2024-10-09 ENCOUNTER — TELEPHONE (OUTPATIENT)
Dept: TRANSPLANT | Facility: HOSPITAL | Age: 74
End: 2024-10-09
Payer: COMMERCIAL

## 2024-10-09 DIAGNOSIS — Z94.0 KIDNEY REPLACED BY TRANSPLANT (HHS-HCC): ICD-10-CM

## 2024-10-09 LAB
CMV DNA SERPL NAA+PROBE-LOG IU: NORMAL {LOG_IU}/ML
EBV DNA SPEC NAA+PROBE-LOG#: NORMAL {LOG_COPIES}/ML
LABORATORY COMMENT REPORT: NOT DETECTED
LABORATORY COMMENT REPORT: NOT DETECTED

## 2024-10-09 NOTE — TELEPHONE ENCOUNTER
LVM for patient to return call, Tac level low at 3.5  How much is she taking? How is she taking it? What time is she taking it?

## 2024-10-10 NOTE — TELEPHONE ENCOUNTER
Call placed to patient, asked if she had missed any doses of Tacrolimus, patient states she did recently miss a dose. Asked her to please repeat tac lab in the morning, tomorrow Friday 10/11. Lab orders updated.   Patient states she understands plan.

## 2024-10-11 ENCOUNTER — LAB (OUTPATIENT)
Dept: LAB | Facility: LAB | Age: 74
End: 2024-10-11
Payer: COMMERCIAL

## 2024-10-11 DIAGNOSIS — Z94.0 KIDNEY REPLACED BY TRANSPLANT (HHS-HCC): ICD-10-CM

## 2024-10-11 LAB
25(OH)D3 SERPL-MCNC: 52 NG/ML (ref 30–100)
ALBUMIN SERPL BCP-MCNC: 4 G/DL (ref 3.4–5)
ANION GAP SERPL CALC-SCNC: 14 MMOL/L (ref 10–20)
BUN SERPL-MCNC: 14 MG/DL (ref 6–23)
CALCIUM SERPL-MCNC: 10.2 MG/DL (ref 8.6–10.6)
CHLORIDE SERPL-SCNC: 105 MMOL/L (ref 98–107)
CO2 SERPL-SCNC: 24 MMOL/L (ref 21–32)
CREAT SERPL-MCNC: 0.96 MG/DL (ref 0.5–1.05)
EGFRCR SERPLBLD CKD-EPI 2021: 63 ML/MIN/1.73M*2
GLUCOSE SERPL-MCNC: 144 MG/DL (ref 74–99)
MAGNESIUM SERPL-MCNC: 2.17 MG/DL (ref 1.6–2.4)
PHOSPHATE SERPL-MCNC: 3.5 MG/DL (ref 2.5–4.9)
POTASSIUM SERPL-SCNC: 4.6 MMOL/L (ref 3.5–5.3)
PTH-INTACT SERPL-MCNC: 202.2 PG/ML (ref 18.5–88)
SODIUM SERPL-SCNC: 138 MMOL/L (ref 136–145)
TACROLIMUS BLD-MCNC: 4.4 NG/ML

## 2024-10-11 PROCEDURE — 36415 COLL VENOUS BLD VENIPUNCTURE: CPT

## 2024-11-07 ENCOUNTER — LAB (OUTPATIENT)
Dept: LAB | Facility: LAB | Age: 74
End: 2024-11-07
Payer: COMMERCIAL

## 2024-11-07 DIAGNOSIS — Z94.0 KIDNEY REPLACED BY TRANSPLANT (HHS-HCC): ICD-10-CM

## 2024-11-07 LAB
ALBUMIN SERPL BCP-MCNC: 4 G/DL (ref 3.4–5)
ANION GAP SERPL CALC-SCNC: 15 MMOL/L (ref 10–20)
BUN SERPL-MCNC: 14 MG/DL (ref 6–23)
CALCIUM SERPL-MCNC: 10.5 MG/DL (ref 8.6–10.6)
CHLORIDE SERPL-SCNC: 103 MMOL/L (ref 98–107)
CO2 SERPL-SCNC: 27 MMOL/L (ref 21–32)
CREAT SERPL-MCNC: 1.18 MG/DL (ref 0.5–1.05)
CREAT UR-MCNC: 80.8 MG/DL (ref 20–320)
EGFRCR SERPLBLD CKD-EPI 2021: 49 ML/MIN/1.73M*2
GLUCOSE SERPL-MCNC: 173 MG/DL (ref 74–99)
MAGNESIUM SERPL-MCNC: 1.99 MG/DL (ref 1.6–2.4)
PHOSPHATE SERPL-MCNC: 3.3 MG/DL (ref 2.5–4.9)
POTASSIUM SERPL-SCNC: 4.9 MMOL/L (ref 3.5–5.3)
SODIUM SERPL-SCNC: 140 MMOL/L (ref 136–145)
TACROLIMUS BLD-MCNC: 6.1 NG/ML

## 2024-11-07 PROCEDURE — 87799 DETECT AGENT NOS DNA QUANT: CPT

## 2024-11-07 PROCEDURE — 80197 ASSAY OF TACROLIMUS: CPT

## 2024-11-07 PROCEDURE — 83735 ASSAY OF MAGNESIUM: CPT

## 2024-11-07 PROCEDURE — 82570 ASSAY OF URINE CREATININE: CPT

## 2024-11-07 PROCEDURE — 80069 RENAL FUNCTION PANEL: CPT

## 2024-11-07 PROCEDURE — 36415 COLL VENOUS BLD VENIPUNCTURE: CPT

## 2024-11-08 LAB
BKV DNA SERPL NAA+PROBE-LOG#: NORMAL {LOG_COPIES}/ML
CMV DNA SERPL NAA+PROBE-LOG IU: NORMAL {LOG_IU}/ML
EBV DNA SPEC NAA+PROBE-LOG#: NORMAL {LOG_COPIES}/ML
LABORATORY COMMENT REPORT: NOT DETECTED

## 2024-11-11 ENCOUNTER — TELEPHONE (OUTPATIENT)
Dept: TRANSPLANT | Facility: HOSPITAL | Age: 74
End: 2024-11-11
Payer: COMMERCIAL

## 2024-11-11 DIAGNOSIS — Z94.0 KIDNEY REPLACED BY TRANSPLANT (HHS-HCC): ICD-10-CM

## 2024-11-11 NOTE — TELEPHONE ENCOUNTER
Patient called requesting refill for prescription.  Patient's demographics verified, including name d.o.b address and phone number  Patient called requesting a refill of a prescription of .  Patient has enough medication for 0 more days.  Patient is requesting prescription be sent to : [id number] [address] [place] [address]   Phone number is [phone]  Patient's call back number is [phone]

## 2024-11-12 RX ORDER — PREDNISONE 5 MG/1
5 TABLET ORAL DAILY
Qty: 90 TABLET | Refills: 3 | Status: SHIPPED | OUTPATIENT
Start: 2024-11-12 | End: 2025-11-12

## 2024-11-12 NOTE — TELEPHONE ENCOUNTER
Called and spoke with patient.  Confirmed dose and location.  Pred refill sent to Dr. Salazar for approval.

## 2024-11-23 DIAGNOSIS — E11.65 TYPE 2 DIABETES MELLITUS WITH HYPERGLYCEMIA, WITH LONG-TERM CURRENT USE OF INSULIN: ICD-10-CM

## 2024-11-23 DIAGNOSIS — Z79.4 TYPE 2 DIABETES MELLITUS WITH HYPERGLYCEMIA, WITH LONG-TERM CURRENT USE OF INSULIN: ICD-10-CM

## 2024-11-25 RX ORDER — SEMAGLUTIDE 2.68 MG/ML
INJECTION, SOLUTION SUBCUTANEOUS
Qty: 9 ML | Refills: 1 | Status: SHIPPED | OUTPATIENT
Start: 2024-11-25

## 2024-12-13 ENCOUNTER — LAB (OUTPATIENT)
Dept: LAB | Facility: LAB | Age: 74
End: 2024-12-13
Payer: COMMERCIAL

## 2024-12-13 ENCOUNTER — TELEPHONE (OUTPATIENT)
Dept: TRANSPLANT | Facility: HOSPITAL | Age: 74
End: 2024-12-13

## 2024-12-13 DIAGNOSIS — Z94.0 KIDNEY REPLACED BY TRANSPLANT (HHS-HCC): ICD-10-CM

## 2024-12-13 LAB
25(OH)D3 SERPL-MCNC: 38 NG/ML (ref 30–100)
ALBUMIN SERPL BCP-MCNC: 4 G/DL (ref 3.4–5)
ANION GAP SERPL CALC-SCNC: 13 MMOL/L (ref 10–20)
BUN SERPL-MCNC: 15 MG/DL (ref 6–23)
CALCIUM SERPL-MCNC: 10.2 MG/DL (ref 8.6–10.6)
CHLORIDE SERPL-SCNC: 101 MMOL/L (ref 98–107)
CO2 SERPL-SCNC: 26 MMOL/L (ref 21–32)
CREAT SERPL-MCNC: 1.05 MG/DL (ref 0.5–1.05)
CREAT UR-MCNC: 10.5 MG/DL (ref 20–320)
EGFRCR SERPLBLD CKD-EPI 2021: 57 ML/MIN/1.73M*2
GLUCOSE SERPL-MCNC: 130 MG/DL (ref 74–99)
MAGNESIUM SERPL-MCNC: 1.85 MG/DL (ref 1.6–2.4)
PHOSPHATE SERPL-MCNC: 3.3 MG/DL (ref 2.5–4.9)
POTASSIUM SERPL-SCNC: 4.1 MMOL/L (ref 3.5–5.3)
PROT UR-ACNC: 4 MG/DL (ref 5–24)
PROT/CREAT UR: 0.38 MG/MG CREAT (ref 0–0.17)
PTH-INTACT SERPL-MCNC: 217.2 PG/ML (ref 18.5–88)
SODIUM SERPL-SCNC: 136 MMOL/L (ref 136–145)
TACROLIMUS BLD-MCNC: 3.7 NG/ML

## 2024-12-13 PROCEDURE — 80197 ASSAY OF TACROLIMUS: CPT

## 2024-12-13 PROCEDURE — 82306 VITAMIN D 25 HYDROXY: CPT

## 2024-12-13 PROCEDURE — 80069 RENAL FUNCTION PANEL: CPT

## 2024-12-13 PROCEDURE — 87799 DETECT AGENT NOS DNA QUANT: CPT

## 2024-12-13 PROCEDURE — 82570 ASSAY OF URINE CREATININE: CPT

## 2024-12-13 PROCEDURE — 83735 ASSAY OF MAGNESIUM: CPT

## 2024-12-13 PROCEDURE — 36415 COLL VENOUS BLD VENIPUNCTURE: CPT

## 2024-12-13 PROCEDURE — 83970 ASSAY OF PARATHORMONE: CPT

## 2024-12-13 PROCEDURE — 84156 ASSAY OF PROTEIN URINE: CPT

## 2024-12-13 RX ORDER — TACROLIMUS 1 MG/1
2 CAPSULE ORAL EVERY 12 HOURS
Qty: 120 CAPSULE | Refills: 11 | Status: SHIPPED | OUTPATIENT
Start: 2024-12-13 | End: 2025-12-13

## 2024-12-13 NOTE — TELEPHONE ENCOUNTER
Lab review with Dr. Bhatia  -  Tac 3.7, prev 6.1, 4.4, 3..5 - On Tac 1.5 mg BID   -  Plan - Go up to 2 mg BID.   Called patient who verb understanding.  Is going back in 2 weeks to do her [place]. New script sent to Dr. Bhatia to sign .    -

## 2024-12-16 ENCOUNTER — TELEPHONE (OUTPATIENT)
Dept: TRANSPLANT | Facility: HOSPITAL | Age: 74
End: 2024-12-16
Payer: COMMERCIAL

## 2024-12-16 DIAGNOSIS — I15.9 SECONDARY HYPERTENSION: ICD-10-CM

## 2024-12-16 DIAGNOSIS — Z79.4 TYPE 2 DIABETES MELLITUS WITH HYPERGLYCEMIA, WITH LONG-TERM CURRENT USE OF INSULIN: ICD-10-CM

## 2024-12-16 DIAGNOSIS — Z94.0 KIDNEY REPLACED BY TRANSPLANT (HHS-HCC): ICD-10-CM

## 2024-12-16 DIAGNOSIS — E11.65 TYPE 2 DIABETES MELLITUS WITH HYPERGLYCEMIA, WITH LONG-TERM CURRENT USE OF INSULIN: ICD-10-CM

## 2024-12-16 DIAGNOSIS — I70.1 RENAL ARTERY STENOSIS (CMS-HCC): ICD-10-CM

## 2024-12-16 RX ORDER — LOSARTAN POTASSIUM 25 MG/1
25 TABLET ORAL DAILY
Qty: 90 TABLET | Refills: 3 | Status: SHIPPED | OUTPATIENT
Start: 2024-12-16 | End: 2025-12-16

## 2024-12-16 RX ORDER — DAPAGLIFLOZIN 10 MG/1
10 TABLET, FILM COATED ORAL DAILY
Qty: 90 TABLET | Refills: 1 | Status: SHIPPED | OUTPATIENT
Start: 2024-12-16 | End: 2024-12-18 | Stop reason: SDUPTHER

## 2024-12-16 RX ORDER — AMLODIPINE BESYLATE 5 MG/1
5 TABLET ORAL DAILY
Qty: 90 TABLET | Refills: 3 | Status: SHIPPED | OUTPATIENT
Start: 2024-12-16 | End: 2025-12-16

## 2024-12-18 ENCOUNTER — APPOINTMENT (OUTPATIENT)
Dept: ENDOCRINOLOGY | Facility: CLINIC | Age: 74
End: 2024-12-18
Payer: COMMERCIAL

## 2024-12-18 VITALS
WEIGHT: 199 LBS | HEIGHT: 65 IN | HEART RATE: 89 BPM | BODY MASS INDEX: 33.15 KG/M2 | DIASTOLIC BLOOD PRESSURE: 68 MMHG | SYSTOLIC BLOOD PRESSURE: 150 MMHG

## 2024-12-18 DIAGNOSIS — Z79.4 TYPE 2 DIABETES MELLITUS WITH HYPERGLYCEMIA, WITH LONG-TERM CURRENT USE OF INSULIN: ICD-10-CM

## 2024-12-18 DIAGNOSIS — E11.65 TYPE 2 DIABETES MELLITUS WITH HYPERGLYCEMIA, WITH LONG-TERM CURRENT USE OF INSULIN: ICD-10-CM

## 2024-12-18 LAB — POC HEMOGLOBIN A1C: 6.9 % (ref 4.2–6.5)

## 2024-12-18 PROCEDURE — 99214 OFFICE O/P EST MOD 30 MIN: CPT | Performed by: STUDENT IN AN ORGANIZED HEALTH CARE EDUCATION/TRAINING PROGRAM

## 2024-12-18 PROCEDURE — 83036 HEMOGLOBIN GLYCOSYLATED A1C: CPT | Performed by: STUDENT IN AN ORGANIZED HEALTH CARE EDUCATION/TRAINING PROGRAM

## 2024-12-18 PROCEDURE — 3078F DIAST BP <80 MM HG: CPT | Performed by: STUDENT IN AN ORGANIZED HEALTH CARE EDUCATION/TRAINING PROGRAM

## 2024-12-18 PROCEDURE — 3062F POS MACROALBUMINURIA REV: CPT | Performed by: STUDENT IN AN ORGANIZED HEALTH CARE EDUCATION/TRAINING PROGRAM

## 2024-12-18 PROCEDURE — 4010F ACE/ARB THERAPY RXD/TAKEN: CPT | Performed by: STUDENT IN AN ORGANIZED HEALTH CARE EDUCATION/TRAINING PROGRAM

## 2024-12-18 PROCEDURE — 95251 CONT GLUC MNTR ANALYSIS I&R: CPT | Performed by: STUDENT IN AN ORGANIZED HEALTH CARE EDUCATION/TRAINING PROGRAM

## 2024-12-18 PROCEDURE — 1126F AMNT PAIN NOTED NONE PRSNT: CPT | Performed by: STUDENT IN AN ORGANIZED HEALTH CARE EDUCATION/TRAINING PROGRAM

## 2024-12-18 PROCEDURE — 3008F BODY MASS INDEX DOCD: CPT | Performed by: STUDENT IN AN ORGANIZED HEALTH CARE EDUCATION/TRAINING PROGRAM

## 2024-12-18 PROCEDURE — 3077F SYST BP >= 140 MM HG: CPT | Performed by: STUDENT IN AN ORGANIZED HEALTH CARE EDUCATION/TRAINING PROGRAM

## 2024-12-18 RX ORDER — DAPAGLIFLOZIN 10 MG/1
10 TABLET, FILM COATED ORAL DAILY
Qty: 90 TABLET | Refills: 1 | Status: SHIPPED | OUTPATIENT
Start: 2024-12-18

## 2024-12-18 RX ORDER — SEMAGLUTIDE 2.68 MG/ML
2 INJECTION, SOLUTION SUBCUTANEOUS WEEKLY
Qty: 9 ML | Refills: 1 | Status: SHIPPED | OUTPATIENT
Start: 2024-12-18

## 2024-12-18 RX ORDER — BLOOD-GLUCOSE SENSOR
EACH MISCELLANEOUS
Qty: 3 EACH | Refills: 11 | Status: SHIPPED | OUTPATIENT
Start: 2024-12-18

## 2024-12-18 ASSESSMENT — PAIN SCALES - GENERAL: PAINLEVEL_OUTOF10: 0-NO PAIN

## 2024-12-18 NOTE — PROGRESS NOTES
74 F PMH: HLD, HTN, ESRD from htn and DM s/p kidney transplant in 2022, diverticulosis   following up for diabetes on chronic prednisone 5mg daily      Diabetes History  DM diagnosed > 5 years ago   Complications Micro and Macro-ESRD s/p transplant in 2022, neuropathy   Last A1c 6.9 % in 12/18/24     Regimen   farxiga 10mg   Ozempic 2mg   Lispro 8 units TID   NPH 15 in AM with pred of 5 mg daily   Lisorp sliding sacle 2:50 >200 - mostly used in AM     SMBG -Dexcom G6   Hypoglycemia -none      Diet: cutting down on meal portions - still snacking, chocolate usually in  the afternoons.   Wakes up at around 5 AM, goes to bed around 8PM - otherwise 3 balanced meals a day at 6 AM/1PM/ and 6PM.      Comorbidities and Screening  Eye Exam -Mild NPDR - dues next month, 1/25   Foot exam -In office today - 12/18/24  LDL 80  TG 94 lovastatin 40mg   Cr and albuminuria -normal  ACE/ARB-none      PMH; Famhx; Social reviewed and pertinents updated on HPI      Weight loss of 18 pounds over a year on ozempic of 2 mg/wk   No GI side effects  Reports some sx of peripheral neuropathy - UE>LE.     ROS reviewed and is negative except for pertinent findings noted on HPI      Physical Exam  Constitutional:       Appearance: Normal appearance.   Abdominal:      Palpations: Abdomen is soft.   Musculoskeletal:         General: No swelling/ Dexcom G7 site right arm/ Fistula Left sided  Skin:     General: Skin is warm.      Comments: No lipodystrophy   Neurological:      General: No focal deficit present.      Mental Status: She is alert.   Psychiatric:         Mood and Affect: Mood normal.         Behavior: Behavior normal.   Diabetic Foot Exam on bedside today - 12/18/24:  No ulcers, skin changes noted on the dorsal aspect of right toe, sensation intact.         Labs and imaging reviewed, pertinent findings listed on HPI and Impression     Problem List Items Addressed This Visit         HLD (hyperlipidemia) - Primary     Relevant Orders      Lipid panel     Steroid-induced hyperglycemia     Type 2 diabetes mellitus with hyperglycemia, with long-term current use of insulin (Multi)     Relevant Medications     semaglutide 2 mg/dose (8 mg/3 mL) pen injector     dapagliflozin propanediol (Farxiga) 10 mg     insulin NPH, Isophane, (HumuLIN N,NovoLIN N) 100 unit/mL (3 mL) injection         CGM reviewed, minimum of 72 hrs of data reviewed, CGM data reviewed to influence glucose treatment plan:   TIR: 72 %   Low: 1%  Very Low: 0%  Very High: 2%  High: 25%     A1C 12/18/24: 6.9   GMI: 7.2       # Diabetes Type 2 - Micro/Macrovascular complications   # Hyperlipidemia  # Diabetes Type 2 aggravated in the setting of Steroids   Today (12/18/24)  CGM data reviewed -   It does appear that pattern persists as postprandial hyperglycemia upon snacking noted.   Less prominent than before.   No hypoglycemia.   For the most part, with the weight loss as well, seems like requiring less insulin - prandial.   Therefore, today we will attempt to get rid of the scheduled lispro. Instead:     Updated Diabetes Regimen:   Increase NPH from 15 to 20 units  Sliding sacle 2:50 >200 - discontinuing the scheduled lispro of 8 units three times daily  Will Continue with Ozempic of 2 mg/wk   Will Continue with Farxiga of 10 mg orally daily     Today (12/18/24), Lipid panel ordered onto upcoming lab work.     Patient is aware and agreeable to plan.   Follow up in 4 -5 months

## 2024-12-23 ENCOUNTER — LAB (OUTPATIENT)
Dept: LAB | Facility: LAB | Age: 74
End: 2024-12-23
Payer: COMMERCIAL

## 2024-12-23 DIAGNOSIS — E11.65 TYPE 2 DIABETES MELLITUS WITH HYPERGLYCEMIA, WITH LONG-TERM CURRENT USE OF INSULIN: ICD-10-CM

## 2024-12-23 DIAGNOSIS — Z94.0 KIDNEY REPLACED BY TRANSPLANT (HHS-HCC): ICD-10-CM

## 2024-12-23 DIAGNOSIS — Z79.4 TYPE 2 DIABETES MELLITUS WITH HYPERGLYCEMIA, WITH LONG-TERM CURRENT USE OF INSULIN: ICD-10-CM

## 2024-12-23 LAB
25(OH)D3 SERPL-MCNC: 44 NG/ML (ref 30–100)
ALBUMIN SERPL BCP-MCNC: 4.3 G/DL (ref 3.4–5)
ANION GAP SERPL CALC-SCNC: 15 MMOL/L (ref 10–20)
BUN SERPL-MCNC: 21 MG/DL (ref 6–23)
CALCIUM SERPL-MCNC: 10.5 MG/DL (ref 8.6–10.6)
CHLORIDE SERPL-SCNC: 104 MMOL/L (ref 98–107)
CHOLEST SERPL-MCNC: 179 MG/DL (ref 0–199)
CHOLESTEROL/HDL RATIO: 2.5
CO2 SERPL-SCNC: 26 MMOL/L (ref 21–32)
CREAT SERPL-MCNC: 1.03 MG/DL (ref 0.5–1.05)
EGFRCR SERPLBLD CKD-EPI 2021: 58 ML/MIN/1.73M*2
ERYTHROCYTE [DISTWIDTH] IN BLOOD BY AUTOMATED COUNT: 15.5 % (ref 11.5–14.5)
GLUCOSE SERPL-MCNC: 144 MG/DL (ref 74–99)
HCT VFR BLD AUTO: 46.5 % (ref 36–46)
HDLC SERPL-MCNC: 72.4 MG/DL
HGB BLD-MCNC: 14.4 G/DL (ref 12–16)
LDLC SERPL CALC-MCNC: 91 MG/DL
MAGNESIUM SERPL-MCNC: 1.69 MG/DL (ref 1.6–2.4)
MCH RBC QN AUTO: 27.7 PG (ref 26–34)
MCHC RBC AUTO-ENTMCNC: 31 G/DL (ref 32–36)
MCV RBC AUTO: 89 FL (ref 80–100)
NON HDL CHOLESTEROL: 107 MG/DL (ref 0–149)
NRBC BLD-RTO: 0 /100 WBCS (ref 0–0)
PHOSPHATE SERPL-MCNC: 4 MG/DL (ref 2.5–4.9)
PLATELET # BLD AUTO: 242 X10*3/UL (ref 150–450)
POTASSIUM SERPL-SCNC: 4.2 MMOL/L (ref 3.5–5.3)
PTH-INTACT SERPL-MCNC: 173.9 PG/ML (ref 18.5–88)
RBC # BLD AUTO: 5.2 X10*6/UL (ref 4–5.2)
SODIUM SERPL-SCNC: 141 MMOL/L (ref 136–145)
TACROLIMUS BLD-MCNC: 7.7 NG/ML
TRIGL SERPL-MCNC: 79 MG/DL (ref 0–149)
VLDL: 16 MG/DL (ref 0–40)
WBC # BLD AUTO: 4.6 X10*3/UL (ref 4.4–11.3)

## 2024-12-23 PROCEDURE — 85027 COMPLETE CBC AUTOMATED: CPT

## 2024-12-23 PROCEDURE — 83970 ASSAY OF PARATHORMONE: CPT

## 2024-12-23 PROCEDURE — 80069 RENAL FUNCTION PANEL: CPT

## 2024-12-23 PROCEDURE — 80197 ASSAY OF TACROLIMUS: CPT

## 2024-12-23 PROCEDURE — 83735 ASSAY OF MAGNESIUM: CPT

## 2024-12-23 PROCEDURE — 82306 VITAMIN D 25 HYDROXY: CPT

## 2024-12-23 PROCEDURE — 80061 LIPID PANEL: CPT

## 2024-12-23 PROCEDURE — 36415 COLL VENOUS BLD VENIPUNCTURE: CPT

## 2024-12-25 LAB
ALLOSURE SCORE - KIDNEY: 0.18 %
CAREDX_ORDER_ID: NORMAL
CENTER_ORDER_ID: NORMAL
CLIENT SPECIMEN ID - ALLOSURE: NORMAL
DONOR RELATION - ALLOSURE: NORMAL
NOTES - ALLOSURE: NORMAL
RELATIVE CHANGE VALUE - KIDNEY: NORMAL %
TEST COMMENTS - ALLOSURE: NORMAL
TIME POST TX - ALLOSURE: NORMAL
TRANSPLANTED ORGAN - ALLOSURE: NORMAL
TX DATE - ALLOSURE/ALLOMAP: NORMAL
WP_ORDER_ID: NORMAL

## 2025-01-03 ENCOUNTER — LAB (OUTPATIENT)
Dept: LAB | Facility: LAB | Age: 75
End: 2025-01-03
Payer: COMMERCIAL

## 2025-01-03 DIAGNOSIS — R79.0 LOW MAGNESIUM LEVEL: ICD-10-CM

## 2025-01-03 DIAGNOSIS — I15.9 SECONDARY HYPERTENSION: ICD-10-CM

## 2025-01-03 DIAGNOSIS — E55.9 VITAMIN D DEFICIENCY: ICD-10-CM

## 2025-01-03 DIAGNOSIS — Z94.0 KIDNEY REPLACED BY TRANSPLANT (HHS-HCC): ICD-10-CM

## 2025-01-03 LAB
25(OH)D3 SERPL-MCNC: 46 NG/ML (ref 30–100)
ALBUMIN SERPL BCP-MCNC: 4.4 G/DL (ref 3.4–5)
ANION GAP SERPL CALC-SCNC: 13 MMOL/L (ref 10–20)
BUN SERPL-MCNC: 15 MG/DL (ref 6–23)
CALCIUM SERPL-MCNC: 10.6 MG/DL (ref 8.6–10.6)
CHLORIDE SERPL-SCNC: 102 MMOL/L (ref 98–107)
CO2 SERPL-SCNC: 29 MMOL/L (ref 21–32)
CREAT SERPL-MCNC: 1.06 MG/DL (ref 0.5–1.05)
CREAT UR-MCNC: 63.3 MG/DL (ref 20–320)
EGFRCR SERPLBLD CKD-EPI 2021: 56 ML/MIN/1.73M*2
GLUCOSE SERPL-MCNC: 156 MG/DL (ref 74–99)
MAGNESIUM SERPL-MCNC: 1.96 MG/DL (ref 1.6–2.4)
PHOSPHATE SERPL-MCNC: 3.4 MG/DL (ref 2.5–4.9)
POTASSIUM SERPL-SCNC: 4.6 MMOL/L (ref 3.5–5.3)
PROT UR-ACNC: 16 MG/DL (ref 5–24)
PROT/CREAT UR: 0.25 MG/MG CREAT (ref 0–0.17)
PTH-INTACT SERPL-MCNC: 244.8 PG/ML (ref 18.5–88)
SODIUM SERPL-SCNC: 139 MMOL/L (ref 136–145)
TACROLIMUS BLD-MCNC: 5.1 NG/ML

## 2025-01-03 PROCEDURE — 82570 ASSAY OF URINE CREATININE: CPT

## 2025-01-03 PROCEDURE — 83970 ASSAY OF PARATHORMONE: CPT

## 2025-01-03 PROCEDURE — 83735 ASSAY OF MAGNESIUM: CPT

## 2025-01-03 PROCEDURE — 80069 RENAL FUNCTION PANEL: CPT

## 2025-01-03 PROCEDURE — 80197 ASSAY OF TACROLIMUS: CPT

## 2025-01-03 PROCEDURE — 82306 VITAMIN D 25 HYDROXY: CPT

## 2025-01-03 PROCEDURE — 87799 DETECT AGENT NOS DNA QUANT: CPT

## 2025-01-03 PROCEDURE — 84156 ASSAY OF PROTEIN URINE: CPT

## 2025-01-03 RX ORDER — LANOLIN ALCOHOL/MO/W.PET/CERES
1 CREAM (GRAM) TOPICAL DAILY
Qty: 90 TABLET | Refills: 0 | Status: SHIPPED | OUTPATIENT
Start: 2025-01-03

## 2025-01-17 ENCOUNTER — APPOINTMENT (OUTPATIENT)
Dept: OPHTHALMOLOGY | Facility: CLINIC | Age: 75
End: 2025-01-17
Payer: COMMERCIAL

## 2025-01-17 DIAGNOSIS — T38.0X5A STEROID-INDUCED HYPERGLYCEMIA: ICD-10-CM

## 2025-01-17 DIAGNOSIS — H34.9 RETINAL VASCULAR OCCLUSION: ICD-10-CM

## 2025-01-17 DIAGNOSIS — E11.65 TYPE 2 DIABETES MELLITUS WITH HYPERGLYCEMIA, WITH LONG-TERM CURRENT USE OF INSULIN: ICD-10-CM

## 2025-01-17 DIAGNOSIS — R73.9 STEROID-INDUCED HYPERGLYCEMIA: ICD-10-CM

## 2025-01-17 DIAGNOSIS — H35.09 RETINAL VASCULAR LESION: Primary | ICD-10-CM

## 2025-01-17 DIAGNOSIS — Z79.4 TYPE 2 DIABETES MELLITUS WITH HYPERGLYCEMIA, WITH LONG-TERM CURRENT USE OF INSULIN: ICD-10-CM

## 2025-01-17 PROCEDURE — 4010F ACE/ARB THERAPY RXD/TAKEN: CPT | Performed by: OPHTHALMOLOGY

## 2025-01-17 PROCEDURE — 92134 CPTRZ OPH DX IMG PST SGM RTA: CPT | Performed by: OPHTHALMOLOGY

## 2025-01-17 PROCEDURE — 1036F TOBACCO NON-USER: CPT | Performed by: OPHTHALMOLOGY

## 2025-01-17 PROCEDURE — 3061F NEG MICROALBUMINURIA REV: CPT | Performed by: OPHTHALMOLOGY

## 2025-01-17 PROCEDURE — 99214 OFFICE O/P EST MOD 30 MIN: CPT | Performed by: OPHTHALMOLOGY

## 2025-01-17 PROCEDURE — 1159F MED LIST DOCD IN RCRD: CPT | Performed by: OPHTHALMOLOGY

## 2025-01-17 ASSESSMENT — CONF VISUAL FIELD
OS_INFERIOR_NASAL_RESTRICTION: 0
OD_INFERIOR_NASAL_RESTRICTION: 0
OS_INFERIOR_TEMPORAL_RESTRICTION: 0
OD_INFERIOR_TEMPORAL_RESTRICTION: 0
OD_SUPERIOR_TEMPORAL_RESTRICTION: 0
METHOD: COUNTING FINGERS
OS_NORMAL: 1
OS_SUPERIOR_NASAL_RESTRICTION: 0
OD_SUPERIOR_NASAL_RESTRICTION: 0
OD_NORMAL: 1
OS_SUPERIOR_TEMPORAL_RESTRICTION: 0

## 2025-01-17 ASSESSMENT — ENCOUNTER SYMPTOMS
HEMATOLOGIC/LYMPHATIC NEGATIVE: 0
ENDOCRINE NEGATIVE: 0
PSYCHIATRIC NEGATIVE: 0
EYES NEGATIVE: 1
RESPIRATORY NEGATIVE: 0
MUSCULOSKELETAL NEGATIVE: 0
GASTROINTESTINAL NEGATIVE: 0
CARDIOVASCULAR NEGATIVE: 0
ALLERGIC/IMMUNOLOGIC NEGATIVE: 0
CONSTITUTIONAL NEGATIVE: 0
NEUROLOGICAL NEGATIVE: 0

## 2025-01-17 ASSESSMENT — REFRACTION_WEARINGRX
OD_CYLINDER: -1.00
OS_SPHERE: +1.75
OD_AXIS: 080
OD_SPHERE: +1.00
OS_AXIS: 090
OS_ADD: +2.50
OS_CYLINDER: -1.75
OD_ADD: +2.50

## 2025-01-17 ASSESSMENT — VISUAL ACUITY
OS_CC+: +2
OD_CC: 20/25
METHOD: SNELLEN - LINEAR
CORRECTION_TYPE: GLASSES
OS_CC: 20/25

## 2025-01-17 ASSESSMENT — TONOMETRY
OD_IOP_MMHG: 17
IOP_METHOD: GOLDMANN APPLANATION
OS_IOP_MMHG: 17

## 2025-01-17 ASSESSMENT — CUP TO DISC RATIO
OS_RATIO: .3
OD_RATIO: .3

## 2025-01-17 ASSESSMENT — SLIT LAMP EXAM - LIDS
COMMENTS: GOOD POSITION
COMMENTS: GOOD POSITION

## 2025-01-17 ASSESSMENT — EXTERNAL EXAM - RIGHT EYE: OD_EXAM: NORMAL

## 2025-01-17 ASSESSMENT — EXTERNAL EXAM - LEFT EYE: OS_EXAM: NORMAL

## 2025-01-17 NOTE — PROGRESS NOTES
Assessment/Plan   Diagnoses and all orders for this visit:  Retinal vascular lesion  -     OCT, Retina - OU - Both Eyes  Type 2 diabetes mellitus with hyperglycemia, with long-term current use of insulin  -     OCT, Retina - OU - Both Eyes  Steroid-induced hyperglycemia  -     OCT, Retina - OU - Both Eyes  Retinal vascular occlusion  -     OCT, Retina - OU - Both Eyes           TODAY   (OU)   - OCT Macula By:Nick Campoverde    History    Retinal vascular eejlnpO43.09 left macula has cme new    - Fibrovascular stalk extending into vitreous at 3:00 near equator in the left eye    - Possibly NVE but associated with additional tissue not typical of diabetic NVE or vitreous traction              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      - The patient was advised to maintain tight glucose control, tight blood pressure control, and favorable levels of cholesterol, low density lipoprotein, and high density lipoproteins.       rcent ED admission for hyperglycemia    Combined form of age-related cataract, both eyesH25.813    Saw Dr Shea; wait for surgery                   Peripheral ischemia OS with NVE     schemia with vascular proliferation at 3 o clock. Per pt no hx of sickle cell or sickle cell trait in herself or family. hx DM but retinopathy overall mild.     Prior FA showed peripheral nonperufsion with vascular remodeling.       Sp Laser photocoagulation OU    Good laser uptake and coverage OU          She has had enough diabetic disease to warrant a renal transplant. In this situation, probably wise to consider diabetes itself as the  of the ischemia.              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

## 2025-01-20 ENCOUNTER — TELEPHONE (OUTPATIENT)
Facility: HOSPITAL | Age: 75
End: 2025-01-20
Payer: COMMERCIAL

## 2025-01-20 DIAGNOSIS — Z94.0 KIDNEY REPLACED BY TRANSPLANT (HHS-HCC): ICD-10-CM

## 2025-01-20 NOTE — TELEPHONE ENCOUNTER
Patient called requesting refill for prescription.  Patient's demographics verified, including name d.o.b phone number and address.  Patient called requesting a refill of a prescription of   predniSONE (Deltasone) 5 mg tablet   Asprin  Patient has enough medication for 0 more days.  Patient is requesting prescription be sent to   [id number] [address] [place] [address]    Pharmacy in phone number is [phone]   Patient's call back number is   [phone]

## 2025-01-23 ENCOUNTER — OFFICE VISIT (OUTPATIENT)
Facility: HOSPITAL | Age: 75
End: 2025-01-23
Payer: COMMERCIAL

## 2025-01-23 VITALS
DIASTOLIC BLOOD PRESSURE: 72 MMHG | BODY MASS INDEX: 33.28 KG/M2 | OXYGEN SATURATION: 96 % | WEIGHT: 200 LBS | TEMPERATURE: 97.2 F | HEART RATE: 88 BPM | SYSTOLIC BLOOD PRESSURE: 148 MMHG

## 2025-01-23 DIAGNOSIS — I15.9 SECONDARY HYPERTENSION: ICD-10-CM

## 2025-01-23 DIAGNOSIS — Z48.298 AFTERCARE FOLLOWING ORGAN TRANSPLANT: ICD-10-CM

## 2025-01-23 DIAGNOSIS — B25.9 CYTOMEGALOVIRUS (CMV) VIREMIA (MULTI): ICD-10-CM

## 2025-01-23 DIAGNOSIS — Z94.0 IMMUNOSUPPRESSIVE MANAGEMENT ENCOUNTER FOLLOWING KIDNEY TRANSPLANT: ICD-10-CM

## 2025-01-23 DIAGNOSIS — D84.9 IMMUNOSUPPRESSION: ICD-10-CM

## 2025-01-23 DIAGNOSIS — Z79.899 ENCOUNTER FOR LONG-TERM (CURRENT) USE OF HIGH-RISK MEDICATION: ICD-10-CM

## 2025-01-23 DIAGNOSIS — Z79.899 IMMUNOSUPPRESSIVE MANAGEMENT ENCOUNTER FOLLOWING KIDNEY TRANSPLANT: ICD-10-CM

## 2025-01-23 DIAGNOSIS — Z94.0 KIDNEY REPLACED BY TRANSPLANT (HHS-HCC): Primary | ICD-10-CM

## 2025-01-23 PROCEDURE — 3077F SYST BP >= 140 MM HG: CPT | Performed by: STUDENT IN AN ORGANIZED HEALTH CARE EDUCATION/TRAINING PROGRAM

## 2025-01-23 PROCEDURE — 3078F DIAST BP <80 MM HG: CPT | Performed by: STUDENT IN AN ORGANIZED HEALTH CARE EDUCATION/TRAINING PROGRAM

## 2025-01-23 PROCEDURE — 99215 OFFICE O/P EST HI 40 MIN: CPT

## 2025-01-23 PROCEDURE — 4010F ACE/ARB THERAPY RXD/TAKEN: CPT | Performed by: STUDENT IN AN ORGANIZED HEALTH CARE EDUCATION/TRAINING PROGRAM

## 2025-01-23 PROCEDURE — 3061F NEG MICROALBUMINURIA REV: CPT | Performed by: STUDENT IN AN ORGANIZED HEALTH CARE EDUCATION/TRAINING PROGRAM

## 2025-01-23 PROCEDURE — 1126F AMNT PAIN NOTED NONE PRSNT: CPT | Performed by: STUDENT IN AN ORGANIZED HEALTH CARE EDUCATION/TRAINING PROGRAM

## 2025-01-23 PROCEDURE — 1159F MED LIST DOCD IN RCRD: CPT | Performed by: STUDENT IN AN ORGANIZED HEALTH CARE EDUCATION/TRAINING PROGRAM

## 2025-01-23 PROCEDURE — RXMED WILLOW AMBULATORY MEDICATION CHARGE

## 2025-01-23 PROCEDURE — G2211 COMPLEX E/M VISIT ADD ON: HCPCS

## 2025-01-23 RX ORDER — LOSARTAN POTASSIUM 25 MG/1
50 TABLET ORAL 2 TIMES DAILY
Qty: 360 TABLET | Refills: 3 | Status: SHIPPED | OUTPATIENT
Start: 2025-01-23 | End: 2026-01-23

## 2025-01-23 RX ORDER — ASPIRIN 81 MG/1
81 TABLET ORAL DAILY
COMMUNITY

## 2025-01-23 ASSESSMENT — PAIN SCALES - GENERAL: PAINLEVEL_OUTOF10: 0-NO PAIN

## 2025-01-23 NOTE — PATIENT INSTRUCTIONS
PLAN:  Switch to Envarsus 4 mg daily  Increase losartan 50 twice a day  Stop hydralazine   Labs & Cmv every 2 weeks  Rtc 2 months

## 2025-01-23 NOTE — PROGRESS NOTES
TRANSPLANT NEPHROLOGY :   OUTPATIENT CLINIC NOTE      SERVICE DATE : 01/23/2025    REASON FOR VISIT/CHIEF COMPLAINT:  S/P  TRANSPLANT SURGERY  IMMUNOSUPPRESSIVE MEDICATION MANAGEMENT  BLOOD PRESSURE MANAGEMENT    HPI:    Ms. Villa is a 74 y.o. female with past medical history significant for ESKD secondary to diabetes mellitus and hypertension who is s/p DDKT on 8/13/2022 (Dr. Rivera, KDPI 78%, PRA 96%. HCV -/ R -, HBcAb D+/R HBsAb+ (on Vemlidy, never seroconverted), CMV D+/R+, EBV D+ / R-). Left donor kidney, transplanted to patient right pelvis. H/o DGF post operatively needing dialysis once due to hyperkalemia with eventual renal recovery. Other PMH: hyperlipidemia, diverticulosis     Post-txp course notable for CMV viremia. Recent CMV PCR below quantification threshold 6/2024.      Admitted 5/9/2024 for uncontrolled diabetes mellitus with mild DKA, UTI (Klebsiella pneumonia/variicola), SHWETA.     2 y 5 m out  C/o tremors which bother her.  Compliant with meds.  Does not check BP at home - encouraged to get a machine.  Notes smerlly urine but no burning sensation, dysuria, urgency, frequency.  No N/V/D. No fever or chills.    Patient is doing well overall. No new complaints. Denied chest pain, SOB, DORADO, Palpitation. Normal urination and bowel movement. Normal gait and no weakness of arms/legs. No cough, runny nose, sore throat, cold symptoms, or rash. No hearing loss. Normal vision.No problems with his sleep, mood and function. No recent infection, hospitalization, surgery or ER visits.      ROS:  Review of  14 systems was performed system by system. See HPI. Otherwise, the symptoms were negative.    PAST MEDICAL HISTORY:  Past Medical History:   Diagnosis Date    Asymptomatic menopausal state 06/30/2021    Post-menopausal    Chronic kidney disease, stage 3 unspecified (Multi) 09/16/2022    CKD (chronic kidney disease), stage III    Chronic kidney disease, stage 4 (severe) (Multi) 10/17/2019    CKD (chronic  kidney disease), stage IV    Combined forms of age-related cataract, left eye 07/08/2020    Combined form of age-related cataract, left eye    Combined forms of age-related cataract, right eye 07/08/2020    Combined form of age-related cataract, right eye    Dry eyes     Encounter for gynecological examination (general) (routine) without abnormal findings 11/08/2021    Well female exam with routine gynecological exam    Encounter for immunization 02/09/2017    Need for diphtheria-tetanus-pertussis (Tdap) vaccine    Encounter for immunization 02/09/2017    Need for shingles vaccine    Encounter for other preprocedural examination 09/28/2021    Pre-transplant evaluation for kidney transplant    Encounter for screening for malignant neoplasm of cervix 05/21/2018    Screening for cervical cancer    Encounter for screening for malignant neoplasm of colon 11/21/2019    Screening for colon cancer    Encounter for screening for respiratory tuberculosis 01/21/2021    Screening for tuberculosis    Impacted cerumen, right ear 02/11/2016    Impacted cerumen of right ear    Need for assistance at home and no other household member able to render care     Need for home health care    Nonproliferative diabetic retinopathy of both eyes     Personal history of other diseases of the circulatory system     History of hypertension    Personal history of other diseases of the female genital tract 05/21/2018    History of vaginal discharge    Personal history of other diseases of the nervous system and sense organs 01/28/2020    History of viral conjunctivitis    Personal history of other diseases of the nervous system and sense organs 02/11/2016    History of impacted cerumen    Personal history of other diseases of the nervous system and sense organs 02/11/2016    History of impacted cerumen    Personal history of other diseases of the respiratory system 01/28/2020    History of paranasal sinus congestion    Personal history of other  drug therapy 11/09/2017    History of influenza vaccination    Personal history of other endocrine, nutritional and metabolic disease 08/19/2022    History of diabetes mellitus    Personal history of other endocrine, nutritional and metabolic disease 09/09/2022    History of hyperlipidemia    Personal history of other endocrine, nutritional and metabolic disease 06/30/2021    History of hyperlipidemia    Personal history of other medical treatment 06/29/2021    History of screening mammography    Personal history of other medical treatment 09/12/2019    History of screening mammography    Retinal ischemia     Type 1 diabetes mellitus with hypoglycemia with coma 05/27/2022    Type 1 diabetes mellitus with hypoglycemic coma    Type 2 diabetes mellitus without complications (Multi) 01/16/2018    Diabetes mellitus type 2 without retinopathy    Type 2 DM w/mild nonproliferative diabetic retinop w/macular edema         PAST SURGICAL HISTORY:  Past Surgical History:   Procedure Laterality Date    OTHER SURGICAL HISTORY  09/15/2022    Kidney transplantation    OTHER SURGICAL HISTORY  06/05/2020    Colonoscopy    OTHER SURGICAL HISTORY  06/05/2020    Arteriovenous fistula creation procedure    OTHER SURGICAL HISTORY  10/17/2019    Biopsy Renal        SOCIAL HISTORY:  Social History     Socioeconomic History    Marital status: Single     Spouse name: Not on file    Number of children: Not on file    Years of education: Not on file    Highest education level: Not on file   Occupational History    Not on file   Tobacco Use    Smoking status: Former     Types: Cigarettes    Smokeless tobacco: Never   Vaping Use    Vaping status: Never Used   Substance and Sexual Activity    Alcohol use: Not Currently    Drug use: Never    Sexual activity: Not on file   Other Topics Concern    Not on file   Social History Narrative    Not on file     Social Drivers of Health     Financial Resource Strain: Low Risk  (5/8/2024)    Overall Financial  Resource Strain (CARDIA)     Difficulty of Paying Living Expenses: Not hard at all   Food Insecurity: No Food Insecurity (9/23/2024)    Hunger Vital Sign     Worried About Running Out of Food in the Last Year: Never true     Ran Out of Food in the Last Year: Never true   Transportation Needs: No Transportation Needs (5/8/2024)    PRAPARE - Transportation     Lack of Transportation (Medical): No     Lack of Transportation (Non-Medical): No   Physical Activity: Not on file   Stress: Not on file   Social Connections: Not on file   Intimate Partner Violence: Not on file   Housing Stability: Low Risk  (5/8/2024)    Housing Stability Vital Sign     Unable to Pay for Housing in the Last Year: No     Number of Places Lived in the Last Year: 1     Unstable Housing in the Last Year: No       FAMILY HISTORY:  Family History   Problem Relation Name Age of Onset    Ovarian cancer Mother      Hypertension Sister         MEDICATION LIST:  Current Outpatient Medications   Medication Instructions    amLODIPine (NORVASC) 5 mg, oral, Daily    carvedilol (COREG) 50 mg, oral, 2 times daily    clindamycin (Cleocin T) 1 % lotion Clindamycin Phosphate 1 % External Lotion apply topically to affected areas of the face twice daily Quantity: 60 Refills: 0 Ordered: 22-Feb-2023 DO Start : 22-Feb-2023 Active    dapagliflozin propanediol (FARXIGA) 10 mg, oral, Daily    Dexcom G7 Sensor device Change sensor every 10 days    hydrALAZINE (APRESOLINE) 100 mg, oral, 3 times daily    insulin lispro (HumaLOG) 100 unit/mL injection Inject 8 units with meals 3 times a day and sliding scale as directed , expect up to 40 units daily    insulin NPH, Isophane, (HumuLIN N,NovoLIN N) 100 unit/mL (3 mL) injection 18 units with your morning dose of prednisone    ketoconazole (NIZOral) 2 % shampoo Ketoconazole 2 % External Shampoo Quantity: 120 Refills: 0 Ordered: 17-Feb-2023 DO Start : 17-Feb-2023 Active    losartan (COZAAR) 25 mg, oral, Daily    lovastatin  "(MEVACOR) 40 mg, oral, Nightly    magnesium oxide (MAG-OX) 400 mg, oral, Daily    mycophenolate (CELLCEPT) 500 mg, oral, 2 times daily    Ozempic 2 mg, subcutaneous, Weekly    pen needle, diabetic 32 gauge x 3/16\" needle USE AS DIRECTED TO INJECT INSULIN FOUR TIMES DAILY    predniSONE (Deltasone) 5 mg tablet TAKE ONE (1) TABLET BY MOUTH ONCE DAILY.    tacrolimus (PROGRAF) 2 mg, oral, Every 12 hours       ALLERGY  Allergies   Allergen Reactions    Adhesive Tape-Silicones Other     Blistering       PHYSICAL EXAM:    Visit Vitals  /72   Pulse 88   Temp 36.2 °C (97.2 °F) (Temporal)   Wt 90.7 kg (200 lb)   SpO2 96%   BMI 33.28 kg/m²   OB Status Postmenopausal   Smoking Status Former   BSA 2.04 m²          Vital signs - reviewed. Acceptable BP at this office visit.   General Appearance - NAD, Good speech, oriented and alert  HEENT - Supple. Not pale. No jaundice.   CVS - RRR. Normal S1/S2. PINA murmur grade 5 loudest at LUSB, click , rub or gallop  Lungs- clear to auscultation bilaterally  Abdomen - soft , not tender, no guarding, no rigidity. No hepatosplenomegaly. Normal bowel sounds. No masses and ascites. S/P Kidney transplant .  Transplanted kidney is not tender.   Musculoskeletal /Extremities - no edema. Full ROM. No joint tenderness.   Neuro/Psych - appropriate mood and affect. Motor power V/V all extremities. CN I -XII were grossly intact.  Skin - No visible rash      LABS:    Lab Results   Component Value Date    WBC 4.6 12/23/2024    HGB 14.4 12/23/2024    HCT 46.5 (H) 12/23/2024     12/23/2024    CHOL 179 12/23/2024    TRIG 79 12/23/2024    HDL 72.4 12/23/2024    ALT 8 06/14/2024    AST 13 06/14/2024     01/03/2025    K 4.6 01/03/2025     01/03/2025    CREATININE 1.06 (H) 01/03/2025    BUN 15 01/03/2025    CO2 29 01/03/2025    TSH 2.76 11/06/2019    INR 1.2 (H) 05/04/2024    GLUF 260 (H) 05/13/2022    HGBA1C 6.9 (A) 12/18/2024       ASSESSMENT AND PLAN:    Ms. Villa is a 74 y.o. female  " who is here for follow up s/p kidney transplant.    TRANSPLANT DATE: 8/13/2022 (Kidney)      1. ESRD S/P kidney transplant   - Creatinine last check was :  Lab Results   Component Value Date    CREATININE 1.06 (H) 01/03/2025     - Renal allograft function is excellent. Javad Cr 0.8-1.  Low-level proteinuria.  -Allosure last check was 0.18% 12/2024  -Ensure adequate hydration  - Avoid nephrotoxic medications, NSAIDs, and IV contrast.    2. Immunosuppression  -Tacrolimus level last check was 5.1 (goal 5-7)  -Continue current immunosuppression regimen.  TAC 2 mg BID --> Envarsus 4 mg QAM   mg BID  Pred 5 mg/day    Low-level CMV - monitor for now    3. Electrolytes  Lab Results   Component Value Date    GLUCOSE 156 (H) 01/03/2025    CALCIUM 10.6 01/03/2025     01/03/2025    K 4.6 01/03/2025    CO2 29 01/03/2025     01/03/2025    BUN 15 01/03/2025    CREATININE 1.06 (H) 01/03/2025     -Acceptable from last lab drawn    4. Hypertension  Blood Pressures         1/23/2025  0836             BP: 148/72          -Home  BP had been acceptable  -Encourage to monitor home BP  -Continue current anti hypertensive medication  Amlodipine 5 mg/day  Coreg 50 mg BID  Losartan increased to 50 mg BID  Stop hydralazine    5. Bone Mineral Disease/Osteoporosis  Lab Results   Component Value Date    .8 (H) 01/03/2025    CALCIUM 10.6 01/03/2025    PHOS 3.4 01/03/2025    VITD25 46 01/03/2025   Acceptable  - Consider DEXA every 2-3 years , defer to PCP      6.Anemia  Lab Results   Component Value Date    WBC 4.6 12/23/2024    HGB 14.4 12/23/2024    HCT 46.5 (H) 12/23/2024    MCV 89 12/23/2024     12/23/2024   WNL    7.Health maintenance and vaccination  - Flu shot during flu season annually  - Cancer screening is up to date per the patient  - CVD risk management: lovastatin 40 mg/day + ASA    8. Type 2 diabetes  - Basal NPH + bolus lispro  - Dapagliflozin 10 mg/day  - Ozempic      Lab : Routine transplant lab (  CBC, RFP, and anti-rejection trough level, CMV) every 2 weeks  Viral screening PCR, Allosure and UPC per protocol.    Additional Plan :  Switch to Envarsus 4 mg QAM  Increase losartan to 50 mg BID  Stop hydralazine    RTC 2 month(s)    Birgit Ware MD    Transplant Nephrology

## 2025-01-24 ENCOUNTER — DOCUMENTATION (OUTPATIENT)
Facility: HOSPITAL | Age: 75
End: 2025-01-24
Payer: COMMERCIAL

## 2025-01-24 RX ORDER — PREDNISONE 5 MG/1
5 TABLET ORAL DAILY
Qty: 90 TABLET | Refills: 3 | Status: SHIPPED | OUTPATIENT
Start: 2025-01-24 | End: 2026-01-24

## 2025-01-24 NOTE — TELEPHONE ENCOUNTER
Patient called requesting refill for prescription.  I see that the patient has refills at [facility] and told patient. She said pharmacy told her she needs a new order. Alejandra is working on that. Alejandra told patient to give her a call in a few hours to make sure it was sent.   Patient's call back number is   [phone]

## 2025-01-24 NOTE — PROGRESS NOTES
Pt called requesting refill of aspirin 325mg. Med not noted on pt med list. Message sent to provider.

## 2025-01-27 ENCOUNTER — TELEPHONE (OUTPATIENT)
Facility: HOSPITAL | Age: 75
End: 2025-01-27
Payer: COMMERCIAL

## 2025-01-27 DIAGNOSIS — Z94.0 KIDNEY REPLACED BY TRANSPLANT (HHS-HCC): ICD-10-CM

## 2025-01-27 NOTE — TELEPHONE ENCOUNTER
Patient called requesting to speak with coordinator about getting refills . I look at patient chart and let her know the medication is at the pharmacy  Patient call back number is [phone].

## 2025-01-31 ENCOUNTER — SPECIALTY PHARMACY (OUTPATIENT)
Dept: PHARMACY | Facility: CLINIC | Age: 75
End: 2025-01-31

## 2025-01-31 RX ORDER — LOSARTAN POTASSIUM 25 MG/1
50 TABLET ORAL 2 TIMES DAILY
Qty: 360 TABLET | Refills: 3 | Status: CANCELLED | OUTPATIENT
Start: 2025-01-31 | End: 2026-01-31

## 2025-01-31 RX ORDER — CLINDAMYCIN PHOSPHATE 10 UG/ML
LOTION TOPICAL
Status: CANCELLED | OUTPATIENT
Start: 2025-01-31

## 2025-01-31 NOTE — TELEPHONE ENCOUNTER
Per Amparo: amparo called pt and LVM asking what pharmacy she'd like to use. Informed pt that active order is at  [facility] but if she wants a different pharmacy to call back and let us know

## 2025-01-31 NOTE — TELEPHONE ENCOUNTER
Patient called requesting to speak with coordinator about medication that was supposed to be sent to the pharmacy. Losartan   Patient call back number is [phone]

## 2025-02-03 ENCOUNTER — NURSE ONLY (OUTPATIENT)
Facility: HOSPITAL | Age: 75
End: 2025-02-03
Payer: COMMERCIAL

## 2025-02-03 ENCOUNTER — PHARMACY VISIT (OUTPATIENT)
Dept: PHARMACY | Facility: CLINIC | Age: 75
End: 2025-02-03
Payer: COMMERCIAL

## 2025-02-03 DIAGNOSIS — Z94.0 KIDNEY REPLACED BY TRANSPLANT (HHS-HCC): ICD-10-CM

## 2025-02-03 LAB
ALBUMIN SERPL BCP-MCNC: 4.4 G/DL (ref 3.4–5)
ANION GAP SERPL CALC-SCNC: 12 MMOL/L (ref 10–20)
BUN SERPL-MCNC: 18 MG/DL (ref 6–23)
CALCIUM SERPL-MCNC: 10.5 MG/DL (ref 8.6–10.6)
CHLORIDE SERPL-SCNC: 101 MMOL/L (ref 98–107)
CO2 SERPL-SCNC: 28 MMOL/L (ref 21–32)
CREAT SERPL-MCNC: 0.97 MG/DL (ref 0.5–1.05)
CREAT UR-MCNC: 28.1 MG/DL (ref 20–320)
EGFRCR SERPLBLD CKD-EPI 2021: 62 ML/MIN/1.73M*2
GLUCOSE SERPL-MCNC: 126 MG/DL (ref 74–99)
MAGNESIUM SERPL-MCNC: 1.85 MG/DL (ref 1.6–2.4)
PHOSPHATE SERPL-MCNC: 2.9 MG/DL (ref 2.5–4.9)
POTASSIUM SERPL-SCNC: 3.9 MMOL/L (ref 3.5–5.3)
PROT UR-ACNC: <4 MG/DL (ref 5–24)
PROT/CREAT UR: ABNORMAL MG/G{CREAT}
SODIUM SERPL-SCNC: 137 MMOL/L (ref 136–145)
TACROLIMUS BLD-MCNC: 6.7 NG/ML

## 2025-02-03 PROCEDURE — 83735 ASSAY OF MAGNESIUM: CPT

## 2025-02-03 PROCEDURE — 87799 DETECT AGENT NOS DNA QUANT: CPT

## 2025-02-03 PROCEDURE — 82570 ASSAY OF URINE CREATININE: CPT

## 2025-02-03 PROCEDURE — 80197 ASSAY OF TACROLIMUS: CPT

## 2025-02-03 PROCEDURE — 36415 COLL VENOUS BLD VENIPUNCTURE: CPT

## 2025-02-03 PROCEDURE — 82565 ASSAY OF CREATININE: CPT

## 2025-02-17 ENCOUNTER — NURSE ONLY (OUTPATIENT)
Facility: HOSPITAL | Age: 75
End: 2025-02-17
Payer: COMMERCIAL

## 2025-02-17 DIAGNOSIS — E55.9 VITAMIN D DEFICIENCY: ICD-10-CM

## 2025-02-17 DIAGNOSIS — Z94.0 KIDNEY REPLACED BY TRANSPLANT (HHS-HCC): ICD-10-CM

## 2025-02-17 LAB
25(OH)D3 SERPL-MCNC: 49 NG/ML (ref 30–100)
ALBUMIN SERPL BCP-MCNC: 4.1 G/DL (ref 3.4–5)
ANION GAP SERPL CALC-SCNC: 12 MMOL/L (ref 10–20)
BUN SERPL-MCNC: 16 MG/DL (ref 6–23)
CALCIUM SERPL-MCNC: 10 MG/DL (ref 8.6–10.6)
CHLORIDE SERPL-SCNC: 102 MMOL/L (ref 98–107)
CO2 SERPL-SCNC: 29 MMOL/L (ref 21–32)
CREAT SERPL-MCNC: 0.87 MG/DL (ref 0.5–1.05)
CREAT UR-MCNC: 22.6 MG/DL (ref 20–320)
EGFRCR SERPLBLD CKD-EPI 2021: 71 ML/MIN/1.73M*2
GLUCOSE SERPL-MCNC: 152 MG/DL (ref 74–99)
MAGNESIUM SERPL-MCNC: 1.62 MG/DL (ref 1.6–2.4)
PHOSPHATE SERPL-MCNC: 3 MG/DL (ref 2.5–4.9)
POTASSIUM SERPL-SCNC: 4.4 MMOL/L (ref 3.5–5.3)
PROT UR-ACNC: <4 MG/DL (ref 5–24)
PROT/CREAT UR: ABNORMAL MG/G{CREAT}
PTH-INTACT SERPL-MCNC: 230.2 PG/ML (ref 18.5–88)
SODIUM SERPL-SCNC: 139 MMOL/L (ref 136–145)
TACROLIMUS BLD-MCNC: 9.2 NG/ML

## 2025-02-17 PROCEDURE — 80197 ASSAY OF TACROLIMUS: CPT

## 2025-02-17 PROCEDURE — 82570 ASSAY OF URINE CREATININE: CPT

## 2025-02-17 PROCEDURE — 87086 URINE CULTURE/COLONY COUNT: CPT | Performed by: INTERNAL MEDICINE

## 2025-02-17 PROCEDURE — 87799 DETECT AGENT NOS DNA QUANT: CPT

## 2025-02-17 PROCEDURE — 82306 VITAMIN D 25 HYDROXY: CPT

## 2025-02-17 PROCEDURE — 83970 ASSAY OF PARATHORMONE: CPT

## 2025-02-17 PROCEDURE — 80069 RENAL FUNCTION PANEL: CPT

## 2025-02-17 PROCEDURE — 36415 COLL VENOUS BLD VENIPUNCTURE: CPT

## 2025-02-17 PROCEDURE — 83735 ASSAY OF MAGNESIUM: CPT

## 2025-02-18 LAB
BACTERIA UR CULT: NORMAL
BKV DNA SERPL NAA+PROBE-LOG#: NORMAL {LOG_COPIES}/ML
CMV DNA SERPL NAA+PROBE-LOG IU: ABNORMAL {LOG_IU}/ML
LABORATORY COMMENT REPORT: ABNORMAL
LABORATORY COMMENT REPORT: NOT DETECTED

## 2025-02-21 ENCOUNTER — OFFICE VISIT (OUTPATIENT)
Facility: HOSPITAL | Age: 75
End: 2025-02-21
Payer: COMMERCIAL

## 2025-02-21 VITALS
BODY MASS INDEX: 33.45 KG/M2 | DIASTOLIC BLOOD PRESSURE: 76 MMHG | HEIGHT: 65 IN | TEMPERATURE: 96.3 F | WEIGHT: 200.8 LBS | OXYGEN SATURATION: 96 % | SYSTOLIC BLOOD PRESSURE: 150 MMHG | HEART RATE: 96 BPM

## 2025-02-21 DIAGNOSIS — E11.65 TYPE 2 DIABETES MELLITUS WITH HYPERGLYCEMIA, WITH LONG-TERM CURRENT USE OF INSULIN: ICD-10-CM

## 2025-02-21 DIAGNOSIS — Z76.0 MEDICATION REFILL: ICD-10-CM

## 2025-02-21 DIAGNOSIS — Z79.4 TYPE 2 DIABETES MELLITUS WITH HYPERGLYCEMIA, WITH LONG-TERM CURRENT USE OF INSULIN: ICD-10-CM

## 2025-02-21 DIAGNOSIS — I10 PRIMARY HYPERTENSION: ICD-10-CM

## 2025-02-21 DIAGNOSIS — M25.552 CHRONIC PAIN OF BOTH HIPS: Primary | ICD-10-CM

## 2025-02-21 DIAGNOSIS — G89.29 CHRONIC PAIN OF BOTH HIPS: Primary | ICD-10-CM

## 2025-02-21 DIAGNOSIS — M25.551 CHRONIC PAIN OF BOTH HIPS: Primary | ICD-10-CM

## 2025-02-21 PROCEDURE — G2211 COMPLEX E/M VISIT ADD ON: HCPCS

## 2025-02-21 PROCEDURE — 1125F AMNT PAIN NOTED PAIN PRSNT: CPT

## 2025-02-21 PROCEDURE — 4010F ACE/ARB THERAPY RXD/TAKEN: CPT

## 2025-02-21 PROCEDURE — 3062F POS MACROALBUMINURIA REV: CPT

## 2025-02-21 PROCEDURE — 3077F SYST BP >= 140 MM HG: CPT

## 2025-02-21 PROCEDURE — 3078F DIAST BP <80 MM HG: CPT

## 2025-02-21 PROCEDURE — 3008F BODY MASS INDEX DOCD: CPT

## 2025-02-21 PROCEDURE — 99213 OFFICE O/P EST LOW 20 MIN: CPT | Mod: GE

## 2025-02-21 PROCEDURE — 1159F MED LIST DOCD IN RCRD: CPT

## 2025-02-21 PROCEDURE — 99213 OFFICE O/P EST LOW 20 MIN: CPT

## 2025-02-21 RX ORDER — ASPIRIN 325 MG
325 TABLET, DELAYED RELEASE (ENTERIC COATED) ORAL DAILY
Qty: 30 TABLET | Refills: 11 | Status: SHIPPED | OUTPATIENT
Start: 2025-02-21 | End: 2026-02-21

## 2025-02-21 ASSESSMENT — PAIN SCALES - GENERAL: PAINLEVEL_OUTOF10: 10-WORST PAIN EVER

## 2025-02-21 NOTE — PROGRESS NOTES
"Subjective   Patient ID: Jacklyn Villa is a 74 y.o. female who presents for Follow-up.    HPI     Hypertension  BP Readings from Last 3 Encounters:   02/21/25 150/76   01/23/25 148/72   12/18/24 150/68     - meds: losartan 50mg BID, amlodipine 5mg, carvedilol 50mg BID ( Recently stopped hydralazine 100mg TID by Transplant Nephro)  - missing doses?: denies  - taking BP at home?: Yes, take in the morning systolic's in 150 mg  - smoking: denies  - diet: Cooks mostly at home, diabetic diet  - Consistent NSAIDs?: denies  - denies HA, chest pains, SOB, LE edema, vision changes    Diabetes  Lab Results   Component Value Date    HGBA1C 6.9 (A) 12/18/2024     Lab Results   Component Value Date    LDLCALC 91 12/23/2024     The 10-year ASCVD risk score (Ben LINDA, et al., 2019) is: 35.1%    Values used to calculate the score:      Age: 74 years      Sex: Female      Is Non- : Yes      Diabetic: Yes      Tobacco smoker: No      Systolic Blood Pressure: 150 mmHg      Is BP treated: Yes      HDL Cholesterol: 72.4 mg/dL      Total Cholesterol: 179 mg/dL  Lab Results   Component Value Date    CREATININE 0.87 02/17/2025     - medications: NPH 20 units AM, sliding scale lispro, Ozempic 2mg/wk, Farxiga 10 mg daily  - missing any doses: denies  - takes BS?: 170  - highest sugar: 280-290  - any hypoglycemia (sx or <70): denies  - on statin: no  - on ACEi/ARB: yes      Misc:  - Request a Walker with seat, d/t severe BL hip pain with extended walking and needs to take breaks  - Needs refill of Aspirin 325, has been taking this dose since transplant    Review of Systems  12 pt reviewed negative    Objective   /76 (BP Location: Right arm, Patient Position: Sitting) Comment (BP Location): forearm  Pulse 96   Temp 35.7 °C (96.3 °F) (Temporal)   Ht 1.651 m (5' 5\")   Wt 91.1 kg (200 lb 12.8 oz)   SpO2 96%   BMI 33.41 kg/m²     Physical Exam  Constitutional:       Comments: Ambulating with cane   HENT:      " Head: Normocephalic and atraumatic.      Nose: Nose normal.      Mouth/Throat:      Mouth: Mucous membranes are moist.   Eyes:      Conjunctiva/sclera: Conjunctivae normal.   Cardiovascular:      Rate and Rhythm: Normal rate and regular rhythm.      Heart sounds: No murmur heard.  Pulmonary:      Effort: Pulmonary effort is normal.   Musculoskeletal:      Right lower leg: No edema.      Left lower leg: No edema.   Skin:     General: Skin is warm.   Neurological:      General: No focal deficit present.      Mental Status: She is alert.         Assessment/Plan   Jacklyn Villa is a 74 y.o. female who presents for Follow-up on DM and HTN, along with additional request. Detailed plan below;   Diagnoses and all orders for this visit:  Jacklyn was seen today for follow-up.  Diagnoses and all orders for this visit:  Chronic pain of both hips (Primary)  -   Progression of OA of BL hips    -   Ordered Walker with Seat  Medication refill  -     aspirin 325 mg EC tablet; Take 1 tablet (325 mg) by mouth once daily.  Type 2 diabetes mellitus with hyperglycemia, with long-term current use of insulin         - Patient reports BG a little higher since recent medication changes by endocrinology.           -  Denies polydipsia, polyuria, or hypoglycemic episodes          - C/w NPH 20 units AM, sliding scale lispro, Ozempic 2mg/wk, Farxiga 10 mg daily          - Next Endo appt. 5/21/25  Primary hypertension (uncontrolled)        - IO /76, patient reports elevated on home BP devices as well ever since hydralazine was discontinued        -  Next transplant Nephro appt. 3/26        -  Evaluate and please advise on with plans regarding BP control        -  C/w losartan 50mg BID, amlodipine 5mg, carvedilol 50mg BID. No medication changes today.         RTC in 3 months for follow up & Medicare Wellness Exam    Discussed with Dr. Flor Mayo, DO  PGY3

## 2025-02-24 ENCOUNTER — SPECIALTY PHARMACY (OUTPATIENT)
Dept: PHARMACY | Facility: CLINIC | Age: 75
End: 2025-02-24

## 2025-02-24 PROCEDURE — RXMED WILLOW AMBULATORY MEDICATION CHARGE

## 2025-02-24 NOTE — PROGRESS NOTES
I reviewed the resident/fellow's documentation and discussed the patient with the resident/fellow. I agree with the resident/fellow's medical decision making as documented in the note.    Lj Ray MD

## 2025-02-25 ENCOUNTER — PHARMACY VISIT (OUTPATIENT)
Dept: PHARMACY | Facility: CLINIC | Age: 75
End: 2025-02-25
Payer: COMMERCIAL

## 2025-03-03 ENCOUNTER — NURSE ONLY (OUTPATIENT)
Facility: HOSPITAL | Age: 75
End: 2025-03-03
Payer: COMMERCIAL

## 2025-03-03 DIAGNOSIS — E55.9 VITAMIN D DEFICIENCY: ICD-10-CM

## 2025-03-03 DIAGNOSIS — Z94.0 KIDNEY REPLACED BY TRANSPLANT (HHS-HCC): ICD-10-CM

## 2025-03-03 LAB
25(OH)D3 SERPL-MCNC: 43 NG/ML (ref 30–100)
ALBUMIN SERPL BCP-MCNC: 4.1 G/DL (ref 3.4–5)
ANION GAP SERPL CALC-SCNC: 12 MMOL/L (ref 10–20)
BUN SERPL-MCNC: 18 MG/DL (ref 6–23)
CALCIUM SERPL-MCNC: 10 MG/DL (ref 8.6–10.6)
CHLORIDE SERPL-SCNC: 102 MMOL/L (ref 98–107)
CO2 SERPL-SCNC: 27 MMOL/L (ref 21–32)
CREAT SERPL-MCNC: 0.93 MG/DL (ref 0.5–1.05)
CREAT UR-MCNC: 20.1 MG/DL (ref 20–320)
EGFRCR SERPLBLD CKD-EPI 2021: 65 ML/MIN/1.73M*2
GLUCOSE SERPL-MCNC: 156 MG/DL (ref 74–99)
MAGNESIUM SERPL-MCNC: 1.58 MG/DL (ref 1.6–2.4)
PHOSPHATE SERPL-MCNC: 3 MG/DL (ref 2.5–4.9)
POTASSIUM SERPL-SCNC: 3.9 MMOL/L (ref 3.5–5.3)
PROT UR-ACNC: <4 MG/DL (ref 5–24)
PROT/CREAT UR: ABNORMAL MG/G{CREAT}
PTH-INTACT SERPL-MCNC: 181.9 PG/ML (ref 18.5–88)
SODIUM SERPL-SCNC: 137 MMOL/L (ref 136–145)
TACROLIMUS BLD-MCNC: 9.4 NG/ML

## 2025-03-03 PROCEDURE — 80069 RENAL FUNCTION PANEL: CPT

## 2025-03-03 PROCEDURE — 82306 VITAMIN D 25 HYDROXY: CPT

## 2025-03-03 PROCEDURE — 82570 ASSAY OF URINE CREATININE: CPT

## 2025-03-03 PROCEDURE — 36415 COLL VENOUS BLD VENIPUNCTURE: CPT

## 2025-03-03 PROCEDURE — 80197 ASSAY OF TACROLIMUS: CPT

## 2025-03-03 PROCEDURE — 83735 ASSAY OF MAGNESIUM: CPT

## 2025-03-03 PROCEDURE — 87799 DETECT AGENT NOS DNA QUANT: CPT

## 2025-03-03 PROCEDURE — 83970 ASSAY OF PARATHORMONE: CPT

## 2025-03-04 ENCOUNTER — TELEPHONE (OUTPATIENT)
Facility: HOSPITAL | Age: 75
End: 2025-03-04
Payer: COMMERCIAL

## 2025-03-04 DIAGNOSIS — Z94.0 KIDNEY REPLACED BY TRANSPLANT (HHS-HCC): ICD-10-CM

## 2025-03-04 RX ORDER — CARVEDILOL 25 MG/1
TABLET ORAL
Qty: 120 TABLET | Refills: 0 | Status: SHIPPED | OUTPATIENT
Start: 2025-03-04

## 2025-03-04 NOTE — TELEPHONE ENCOUNTER
Labs reviewed with dr sadler     tac 9.4, 9.2, 6.7, 5.1  cr 0.93  envarsus 4 mg recently switched from tac 2 BID    PLAN: decrease envarsus 2 mg daily    Called pt with plan- pt agreeable. Pt getting rpt labs in 1 week

## 2025-03-12 ENCOUNTER — NURSE ONLY (OUTPATIENT)
Facility: HOSPITAL | Age: 75
End: 2025-03-12
Payer: COMMERCIAL

## 2025-03-12 LAB
ALBUMIN SERPL BCP-MCNC: 4.3 G/DL (ref 3.4–5)
ANION GAP SERPL CALC-SCNC: 12 MMOL/L (ref 10–20)
BUN SERPL-MCNC: 13 MG/DL (ref 6–23)
CALCIUM SERPL-MCNC: 10.1 MG/DL (ref 8.6–10.6)
CHLORIDE SERPL-SCNC: 102 MMOL/L (ref 98–107)
CO2 SERPL-SCNC: 27 MMOL/L (ref 21–32)
CREAT SERPL-MCNC: 0.98 MG/DL (ref 0.5–1.05)
EGFRCR SERPLBLD CKD-EPI 2021: 61 ML/MIN/1.73M*2
ERYTHROCYTE [DISTWIDTH] IN BLOOD BY AUTOMATED COUNT: 14.6 % (ref 11.5–14.5)
GLUCOSE SERPL-MCNC: 174 MG/DL (ref 74–99)
HCT VFR BLD AUTO: 44.5 % (ref 36–46)
HGB BLD-MCNC: 14 G/DL (ref 12–16)
MCH RBC QN AUTO: 27.8 PG (ref 26–34)
MCHC RBC AUTO-ENTMCNC: 31.5 G/DL (ref 32–36)
MCV RBC AUTO: 88 FL (ref 80–100)
NRBC BLD-RTO: 0 /100 WBCS (ref 0–0)
PHOSPHATE SERPL-MCNC: 3.2 MG/DL (ref 2.5–4.9)
PLATELET # BLD AUTO: 219 X10*3/UL (ref 150–450)
POTASSIUM SERPL-SCNC: 3.9 MMOL/L (ref 3.5–5.3)
RBC # BLD AUTO: 5.04 X10*6/UL (ref 4–5.2)
SODIUM SERPL-SCNC: 137 MMOL/L (ref 136–145)
TACROLIMUS BLD-MCNC: 6.8 NG/ML
WBC # BLD AUTO: 4.7 X10*3/UL (ref 4.4–11.3)

## 2025-03-12 PROCEDURE — 85027 COMPLETE CBC AUTOMATED: CPT | Performed by: STUDENT IN AN ORGANIZED HEALTH CARE EDUCATION/TRAINING PROGRAM

## 2025-03-12 PROCEDURE — 80197 ASSAY OF TACROLIMUS: CPT | Performed by: STUDENT IN AN ORGANIZED HEALTH CARE EDUCATION/TRAINING PROGRAM

## 2025-03-12 PROCEDURE — 80069 RENAL FUNCTION PANEL: CPT | Performed by: STUDENT IN AN ORGANIZED HEALTH CARE EDUCATION/TRAINING PROGRAM

## 2025-03-17 ENCOUNTER — NURSE ONLY (OUTPATIENT)
Facility: HOSPITAL | Age: 75
End: 2025-03-17
Payer: COMMERCIAL

## 2025-03-17 DIAGNOSIS — Z94.0 KIDNEY REPLACED BY TRANSPLANT (HHS-HCC): ICD-10-CM

## 2025-03-17 LAB
ALBUMIN SERPL BCP-MCNC: 4.2 G/DL (ref 3.4–5)
ANION GAP SERPL CALC-SCNC: 12 MMOL/L (ref 10–20)
BUN SERPL-MCNC: 18 MG/DL (ref 6–23)
CALCIUM SERPL-MCNC: 10.2 MG/DL (ref 8.6–10.6)
CHLORIDE SERPL-SCNC: 103 MMOL/L (ref 98–107)
CO2 SERPL-SCNC: 26 MMOL/L (ref 21–32)
CREAT SERPL-MCNC: 0.92 MG/DL (ref 0.5–1.05)
CREAT UR-MCNC: 10.2 MG/DL (ref 20–320)
EGFRCR SERPLBLD CKD-EPI 2021: 66 ML/MIN/1.73M*2
GLUCOSE SERPL-MCNC: 157 MG/DL (ref 74–99)
MAGNESIUM SERPL-MCNC: 1.89 MG/DL (ref 1.6–2.4)
PHOSPHATE SERPL-MCNC: 3.1 MG/DL (ref 2.5–4.9)
POTASSIUM SERPL-SCNC: 4.2 MMOL/L (ref 3.5–5.3)
PROT UR-ACNC: <4 MG/DL (ref 5–24)
PROT/CREAT UR: ABNORMAL MG/G{CREAT}
SODIUM SERPL-SCNC: 137 MMOL/L (ref 136–145)
TACROLIMUS BLD-MCNC: 6.1 NG/ML

## 2025-03-17 PROCEDURE — 80069 RENAL FUNCTION PANEL: CPT

## 2025-03-17 PROCEDURE — 82570 ASSAY OF URINE CREATININE: CPT

## 2025-03-17 PROCEDURE — 80197 ASSAY OF TACROLIMUS: CPT

## 2025-03-17 PROCEDURE — 83735 ASSAY OF MAGNESIUM: CPT

## 2025-03-17 PROCEDURE — 87799 DETECT AGENT NOS DNA QUANT: CPT

## 2025-03-17 PROCEDURE — 36415 COLL VENOUS BLD VENIPUNCTURE: CPT

## 2025-03-20 PROCEDURE — RXMED WILLOW AMBULATORY MEDICATION CHARGE

## 2025-03-21 ENCOUNTER — PHARMACY VISIT (OUTPATIENT)
Dept: PHARMACY | Facility: CLINIC | Age: 75
End: 2025-03-21
Payer: COMMERCIAL

## 2025-03-26 ENCOUNTER — OFFICE VISIT (OUTPATIENT)
Facility: HOSPITAL | Age: 75
End: 2025-03-26
Payer: COMMERCIAL

## 2025-03-26 VITALS
WEIGHT: 201.6 LBS | OXYGEN SATURATION: 99 % | DIASTOLIC BLOOD PRESSURE: 75 MMHG | BODY MASS INDEX: 33.55 KG/M2 | HEART RATE: 82 BPM | SYSTOLIC BLOOD PRESSURE: 157 MMHG | TEMPERATURE: 97.3 F

## 2025-03-26 DIAGNOSIS — Z48.298 AFTERCARE FOLLOWING ORGAN TRANSPLANT: ICD-10-CM

## 2025-03-26 DIAGNOSIS — Z94.0 KIDNEY REPLACED BY TRANSPLANT (HHS-HCC): Primary | ICD-10-CM

## 2025-03-26 DIAGNOSIS — I15.9 SECONDARY HYPERTENSION: ICD-10-CM

## 2025-03-26 DIAGNOSIS — R78.81 BACTEREMIA: ICD-10-CM

## 2025-03-26 DIAGNOSIS — Z94.0 IMMUNOSUPPRESSIVE MANAGEMENT ENCOUNTER FOLLOWING KIDNEY TRANSPLANT: ICD-10-CM

## 2025-03-26 DIAGNOSIS — Z79.899 IMMUNOSUPPRESSIVE MANAGEMENT ENCOUNTER FOLLOWING KIDNEY TRANSPLANT: ICD-10-CM

## 2025-03-26 DIAGNOSIS — I70.1 RENAL ARTERY STENOSIS (CMS-HCC): ICD-10-CM

## 2025-03-26 DIAGNOSIS — B25.9 CYTOMEGALOVIRUS (CMV) VIREMIA (MULTI): ICD-10-CM

## 2025-03-26 PROCEDURE — 3078F DIAST BP <80 MM HG: CPT | Performed by: STUDENT IN AN ORGANIZED HEALTH CARE EDUCATION/TRAINING PROGRAM

## 2025-03-26 PROCEDURE — 1159F MED LIST DOCD IN RCRD: CPT | Performed by: STUDENT IN AN ORGANIZED HEALTH CARE EDUCATION/TRAINING PROGRAM

## 2025-03-26 PROCEDURE — 4010F ACE/ARB THERAPY RXD/TAKEN: CPT | Performed by: STUDENT IN AN ORGANIZED HEALTH CARE EDUCATION/TRAINING PROGRAM

## 2025-03-26 PROCEDURE — 3077F SYST BP >= 140 MM HG: CPT | Performed by: STUDENT IN AN ORGANIZED HEALTH CARE EDUCATION/TRAINING PROGRAM

## 2025-03-26 PROCEDURE — 99215 OFFICE O/P EST HI 40 MIN: CPT

## 2025-03-26 PROCEDURE — 3062F POS MACROALBUMINURIA REV: CPT | Performed by: STUDENT IN AN ORGANIZED HEALTH CARE EDUCATION/TRAINING PROGRAM

## 2025-03-26 PROCEDURE — G2211 COMPLEX E/M VISIT ADD ON: HCPCS

## 2025-03-26 PROCEDURE — 1126F AMNT PAIN NOTED NONE PRSNT: CPT | Performed by: STUDENT IN AN ORGANIZED HEALTH CARE EDUCATION/TRAINING PROGRAM

## 2025-03-26 RX ORDER — MYCOPHENOLATE MOFETIL 500 MG/1
500 TABLET ORAL 2 TIMES DAILY
Qty: 180 TABLET | Refills: 3 | Status: SHIPPED | OUTPATIENT
Start: 2025-03-26 | End: 2026-03-26

## 2025-03-26 RX ORDER — AMLODIPINE BESYLATE 10 MG/1
10 TABLET ORAL DAILY
Qty: 90 TABLET | Refills: 3 | Status: SHIPPED | OUTPATIENT
Start: 2025-03-26 | End: 2026-03-26

## 2025-03-26 ASSESSMENT — PAIN SCALES - GENERAL: PAINLEVEL_OUTOF10: 0-NO PAIN

## 2025-03-26 NOTE — PROGRESS NOTES
TRANSPLANT NEPHROLOGY :   OUTPATIENT CLINIC NOTE      SERVICE DATE : 03/26/2025    REASON FOR VISIT/CHIEF COMPLAINT:  S/P  TRANSPLANT SURGERY  IMMUNOSUPPRESSIVE MEDICATION MANAGEMENT  BLOOD PRESSURE MANAGEMENT    HPI:    Ms. Villa is a 74 y.o. female with past medical history significant for ESKD secondary to diabetes mellitus and hypertension who is s/p DDKT on 8/13/2022 (Dr. iRvera, KDPI 78%, PRA 96%. HCV -/ R -, HBcAb D+/R HBsAb+ (on Vemlidy, never seroconverted), CMV D+/R+, EBV D+ / R-). Left donor kidney, transplanted to patient right pelvis. H/o DGF post operatively needing dialysis once due to hyperkalemia with eventual renal recovery. Other PMH: hyperlipidemia, diverticulosis     Post-txp course notable for CMV viremia. Recent CMV PCR below quantification threshold 6/2024.      Admitted 5/9/2024 for uncontrolled diabetes mellitus with mild DKA, UTI (Klebsiella pneumonia/variicola), SHWETA.     2y7m out  Home -150s/60s  No orthostatic symptoms.  Doing well. No N/V/D. No dysuria but notes smelly urine.  No allograft tenderness.  Still c/o tremors - which have been better but it is still difficult for her when she writes or performs fine movement.    Patient is doing well overall. No new complaints. Denied chest pain, SOB, DORADO, Palpitation. Normal urination and bowel movement. Normal gait and no weakness of arms/legs. No cough, runny nose, sore throat, cold symptoms, or rash. No hearing loss. Normal vision.No problems with his sleep, mood and function. No recent infection, hospitalization, surgery or ER visits.      ROS:  Review of  14 systems was performed system by system. See HPI. Otherwise, the symptoms were negative.    PAST MEDICAL HISTORY:  Past Medical History:   Diagnosis Date    Asymptomatic menopausal state 06/30/2021    Post-menopausal    Chronic kidney disease, stage 3 unspecified (Multi) 09/16/2022    CKD (chronic kidney disease), stage III    Chronic kidney disease, stage 4 (severe)  (Multi) 10/17/2019    CKD (chronic kidney disease), stage IV    Combined forms of age-related cataract, left eye 07/08/2020    Combined form of age-related cataract, left eye    Combined forms of age-related cataract, right eye 07/08/2020    Combined form of age-related cataract, right eye    Dry eyes     Encounter for gynecological examination (general) (routine) without abnormal findings 11/08/2021    Well female exam with routine gynecological exam    Encounter for immunization 02/09/2017    Need for diphtheria-tetanus-pertussis (Tdap) vaccine    Encounter for immunization 02/09/2017    Need for shingles vaccine    Encounter for other preprocedural examination 09/28/2021    Pre-transplant evaluation for kidney transplant    Encounter for screening for malignant neoplasm of cervix 05/21/2018    Screening for cervical cancer    Encounter for screening for malignant neoplasm of colon 11/21/2019    Screening for colon cancer    Encounter for screening for respiratory tuberculosis 01/21/2021    Screening for tuberculosis    Impacted cerumen, right ear 02/11/2016    Impacted cerumen of right ear    Need for assistance at home and no other household member able to render care     Need for home health care    Nonproliferative diabetic retinopathy of both eyes     Personal history of other diseases of the circulatory system     History of hypertension    Personal history of other diseases of the female genital tract 05/21/2018    History of vaginal discharge    Personal history of other diseases of the nervous system and sense organs 01/28/2020    History of viral conjunctivitis    Personal history of other diseases of the nervous system and sense organs 02/11/2016    History of impacted cerumen    Personal history of other diseases of the nervous system and sense organs 02/11/2016    History of impacted cerumen    Personal history of other diseases of the respiratory system 01/28/2020    History of paranasal sinus  congestion    Personal history of other drug therapy 11/09/2017    History of influenza vaccination    Personal history of other endocrine, nutritional and metabolic disease 08/19/2022    History of diabetes mellitus    Personal history of other endocrine, nutritional and metabolic disease 09/09/2022    History of hyperlipidemia    Personal history of other endocrine, nutritional and metabolic disease 06/30/2021    History of hyperlipidemia    Personal history of other medical treatment 06/29/2021    History of screening mammography    Personal history of other medical treatment 09/12/2019    History of screening mammography    Retinal ischemia     Type 1 diabetes mellitus with hypoglycemia with coma 05/27/2022    Type 1 diabetes mellitus with hypoglycemic coma    Type 2 diabetes mellitus without complications (Multi) 01/16/2018    Diabetes mellitus type 2 without retinopathy    Type 2 DM w/mild nonproliferative diabetic retinop w/macular edema         PAST SURGICAL HISTORY:  Past Surgical History:   Procedure Laterality Date    OTHER SURGICAL HISTORY  09/15/2022    Kidney transplantation    OTHER SURGICAL HISTORY  06/05/2020    Colonoscopy    OTHER SURGICAL HISTORY  06/05/2020    Arteriovenous fistula creation procedure    OTHER SURGICAL HISTORY  10/17/2019    Biopsy Renal        SOCIAL HISTORY:  Social History     Socioeconomic History    Marital status: Single     Spouse name: Not on file    Number of children: Not on file    Years of education: Not on file    Highest education level: Not on file   Occupational History    Not on file   Tobacco Use    Smoking status: Former     Types: Cigarettes    Smokeless tobacco: Never   Vaping Use    Vaping status: Never Used   Substance and Sexual Activity    Alcohol use: Not Currently    Drug use: Never    Sexual activity: Not on file   Other Topics Concern    Not on file   Social History Narrative    Not on file     Social Drivers of Health     Financial Resource Strain:  Low Risk  (5/8/2024)    Overall Financial Resource Strain (CARDIA)     Difficulty of Paying Living Expenses: Not hard at all   Food Insecurity: No Food Insecurity (9/23/2024)    Hunger Vital Sign     Worried About Running Out of Food in the Last Year: Never true     Ran Out of Food in the Last Year: Never true   Transportation Needs: No Transportation Needs (5/8/2024)    PRAPARE - Transportation     Lack of Transportation (Medical): No     Lack of Transportation (Non-Medical): No   Physical Activity: Not on file   Stress: Not on file   Social Connections: Not on file   Intimate Partner Violence: Not on file   Housing Stability: Low Risk  (5/8/2024)    Housing Stability Vital Sign     Unable to Pay for Housing in the Last Year: No     Number of Places Lived in the Last Year: 1     Unstable Housing in the Last Year: No       FAMILY HISTORY:  Family History   Problem Relation Name Age of Onset    Ovarian cancer Mother      Hypertension Sister         MEDICATION LIST:  Current Outpatient Medications   Medication Instructions    amLODIPine (NORVASC) 5 mg, oral, Daily    aspirin 325 mg, oral, Daily    carvedilol (Coreg) 25 mg tablet TAKE 2 TABLETS (50 MG) BY MOUTH TWO TIMES A DAY    clindamycin (Cleocin T) 1 % lotion Clindamycin Phosphate 1 % External Lotion apply topically to affected areas of the face twice daily Quantity: 60 Refills: 0 Ordered: 22-Feb-2023 DO Start : 22-Feb-2023 Active    dapagliflozin propanediol (FARXIGA) 10 mg, oral, Daily    Dexcom G7 Sensor device Change sensor every 10 days    Envarsus XR 2 mg, oral, Daily    insulin lispro (HumaLOG) 100 unit/mL injection Inject 8 units with meals 3 times a day and sliding scale as directed , expect up to 40 units daily    insulin NPH, Isophane, (HumuLIN N,NovoLIN N) 100 unit/mL (3 mL) injection 18 units with your morning dose of prednisone    ketoconazole (NIZOral) 2 % shampoo Ketoconazole 2 % External Shampoo Quantity: 120 Refills: 0 Ordered: 17-Feb-2023 DO  "Start : 17-Feb-2023 Active    losartan (COZAAR) 50 mg, oral, 2 times daily    lovastatin (MEVACOR) 40 mg, oral, Nightly    magnesium oxide (MAG-OX) 400 mg, oral, Daily    mycophenolate (CELLCEPT) 500 mg, oral, 2 times daily    Ozempic 2 mg, subcutaneous, Weekly    pen needle, diabetic 32 gauge x 3/16\" needle USE AS DIRECTED TO INJECT INSULIN FOUR TIMES DAILY    predniSONE (Deltasone) 5 mg tablet TAKE ONE (1) TABLET BY MOUTH ONCE DAILY.       ALLERGY  Allergies   Allergen Reactions    Adhesive Tape-Silicones Other     Blistering       PHYSICAL EXAM:    Visit Vitals  /75   Pulse 82   Temp 36.3 °C (97.3 °F) (Temporal)   Wt 91.4 kg (201 lb 9.6 oz)   SpO2 99%   BMI 33.55 kg/m²   OB Status Postmenopausal   Smoking Status Former   BSA 2.05 m²          Vital signs - reviewed. Acceptable BP at this office visit.   General Appearance - NAD, Good speech, oriented and alert  HEENT - Supple. Not pale. No jaundice.   CVS - RRR. Normal S1/S2. No murmur, click , rub or gallop  Lungs- clear to auscultation bilaterally  Abdomen - soft , not tender, no guarding, no rigidity.  S/P Kidney transplant .  Transplanted kidney is not tender.   Musculoskeletal /Extremities - no edema. Full ROM. No joint tenderness.   Neuro/Psych - appropriate mood and affect. Motor power V/V all extremities. CN I -XII were grossly intact.  Skin - No visible rash      LABS:    Lab Results   Component Value Date    WBC 4.7 03/12/2025    HGB 14.0 03/12/2025    HCT 44.5 03/12/2025     03/12/2025    CHOL 179 12/23/2024    TRIG 79 12/23/2024    HDL 72.4 12/23/2024    ALT 8 06/14/2024    AST 13 06/14/2024     03/17/2025    K 4.2 03/17/2025     03/17/2025    CREATININE 0.92 03/17/2025    BUN 18 03/17/2025    CO2 26 03/17/2025    TSH 2.76 11/06/2019    INR 1.2 (H) 05/04/2024    GLUF 260 (H) 05/13/2022    HGBA1C 6.9 (A) 12/18/2024     ASSESSMENT AND PLAN:    Ms. Villa is a 74 y.o. female  who is here for follow up s/p kidney " transplant.    TRANSPLANT DATE: 8/13/2022 (Kidney)      1. ESRD S/P kidney transplant   - Creatinine last check was :  Lab Results   Component Value Date    CREATININE 0.92 03/17/2025       - Renal allograft function is excellent. Javad Cr 0.8-1. Low level proteinuria.  -Allosure last check was 0.18% 12/23/24  -Ensure adequate hydration  - Avoid nephrotoxic medications, NSAIDs, and IV contrast.    2. Immunosuppression  -Tacrolimus level last check was 6.1 (goal 5-7)  -Continue current immunosuppression regimen.  Envarsus 2 mg QAM  MMF increased to 500 mg BID  Pred 5 mg/day    CMV viremia - resolved    3. Electrolytes  Lab Results   Component Value Date    GLUCOSE 157 (H) 03/17/2025    CALCIUM 10.2 03/17/2025     03/17/2025    K 4.2 03/17/2025    CO2 26 03/17/2025     03/17/2025    BUN 18 03/17/2025    CREATININE 0.92 03/17/2025     -Acceptable from last lab drawn    4. Hypertension  Blood Pressures         3/26/2025  0844             BP: 157/75          -Home  BP had been acceptable although slightly above goal  -Encourage to monitor home BP  -Continue current anti hypertensive medication  Amlodipine 5 mg/day --> 10 mg/day  Coreg 25 mg BID  Losartan 50 mg BID    5. Bone Mineral Disease/Osteoporosis  Lab Results   Component Value Date    .9 (H) 03/03/2025    CALCIUM 10.2 03/17/2025    PHOS 3.1 03/17/2025    VITD25 43 03/03/2025   PTH acceptable. Ca/Phos ok. Vit D at goal    6.Anemia  Lab Results   Component Value Date    WBC 4.7 03/12/2025    HGB 14.0 03/12/2025    HCT 44.5 03/12/2025    MCV 88 03/12/2025     03/12/2025     Lab Results   Component Value Date    IRON 74 08/14/2022    TIBC 190 (L) 08/14/2022    FERRITIN 302 (H) 10/22/2018   OK    7.Health maintenance and vaccination  - Flu shot during flu season annually  - Cancer screening is up to date per the patient  - CVD prevention: lovastatin 40 mg at bedtime. Non- 12/2024 acceptable +  mg/day  - T2DM: basal NPH/Lispro +  Farxiga 10 mg/day + Ozempic 2 mg subcutaneous weekly    Lab : Routine transplant lab ( CBC, RFP, and anti-rejection trough level) every 1 month  VIT D, PTH q6mo  Viral screening PCR, Allosure and UPC per protocol.    Additional Plan :  - Check UA to rule out UTI  - Amlodipine increased to 10 mg/day  - Increase MMF to 500 mg BID  If CMV remains negative, will consider going up further and target TAC around 4-5 to improve tremors    RTC 4 month(s)    Birgit Ware MD    Transplant Nephrology

## 2025-03-26 NOTE — Clinical Note
Increased Mycophenolate to 500 mg every 12 hours  Patient complains about urine smell, will do UA today Labs every month Allosure and DSA on 3/30  RTC in 4 month

## 2025-03-26 NOTE — PATIENT INSTRUCTIONS
Increased Mycophenolate to 500 mg every 12 hours   Increased Amlodipine to 10 mg   Patient complains about urine smell, will do UA today  Labs every month  Allosure and DSA on 3/30   RTC in 4 month

## 2025-03-31 ENCOUNTER — NURSE ONLY (OUTPATIENT)
Facility: HOSPITAL | Age: 75
End: 2025-03-31
Payer: COMMERCIAL

## 2025-03-31 DIAGNOSIS — Z94.0 KIDNEY REPLACED BY TRANSPLANT (HHS-HCC): ICD-10-CM

## 2025-03-31 LAB
ALBUMIN SERPL BCP-MCNC: 3.9 G/DL (ref 3.4–5)
ANION GAP SERPL CALC-SCNC: 15 MMOL/L (ref 10–20)
APPEARANCE UR: CLEAR
BILIRUB UR STRIP.AUTO-MCNC: NEGATIVE MG/DL
BUN SERPL-MCNC: 17 MG/DL (ref 6–23)
CALCIUM SERPL-MCNC: 10 MG/DL (ref 8.6–10.6)
CHLORIDE SERPL-SCNC: 101 MMOL/L (ref 98–107)
CO2 SERPL-SCNC: 26 MMOL/L (ref 21–32)
COLOR UR: COLORLESS
CREAT SERPL-MCNC: 0.84 MG/DL (ref 0.5–1.05)
EGFRCR SERPLBLD CKD-EPI 2021: 74 ML/MIN/1.73M*2
GLUCOSE SERPL-MCNC: 181 MG/DL (ref 74–99)
GLUCOSE UR STRIP.AUTO-MCNC: ABNORMAL MG/DL
HOLD SPECIMEN: NORMAL
KETONES UR STRIP.AUTO-MCNC: NEGATIVE MG/DL
LEUKOCYTE ESTERASE UR QL STRIP.AUTO: ABNORMAL
MAGNESIUM SERPL-MCNC: 1.77 MG/DL (ref 1.6–2.4)
MUCOUS THREADS #/AREA URNS AUTO: ABNORMAL /LPF
NITRITE UR QL STRIP.AUTO: NEGATIVE
PH UR STRIP.AUTO: 5.5 [PH]
PHOSPHATE SERPL-MCNC: 2.9 MG/DL (ref 2.5–4.9)
POTASSIUM SERPL-SCNC: 3.8 MMOL/L (ref 3.5–5.3)
PROT UR STRIP.AUTO-MCNC: NEGATIVE MG/DL
RBC # UR STRIP.AUTO: NEGATIVE MG/DL
RBC #/AREA URNS AUTO: ABNORMAL /HPF
SODIUM SERPL-SCNC: 138 MMOL/L (ref 136–145)
SP GR UR STRIP.AUTO: 1
TACROLIMUS BLD-MCNC: 5 NG/ML
UROBILINOGEN UR STRIP.AUTO-MCNC: NORMAL MG/DL
WBC #/AREA URNS AUTO: ABNORMAL /HPF

## 2025-03-31 PROCEDURE — 86833 HLA CLASS II HIGH DEFIN QUAL: CPT | Performed by: STUDENT IN AN ORGANIZED HEALTH CARE EDUCATION/TRAINING PROGRAM

## 2025-03-31 PROCEDURE — 82565 ASSAY OF CREATININE: CPT

## 2025-03-31 PROCEDURE — 80197 ASSAY OF TACROLIMUS: CPT

## 2025-03-31 PROCEDURE — 87086 URINE CULTURE/COLONY COUNT: CPT | Performed by: STUDENT IN AN ORGANIZED HEALTH CARE EDUCATION/TRAINING PROGRAM

## 2025-03-31 PROCEDURE — 81001 URINALYSIS AUTO W/SCOPE: CPT | Performed by: STUDENT IN AN ORGANIZED HEALTH CARE EDUCATION/TRAINING PROGRAM

## 2025-03-31 PROCEDURE — 83735 ASSAY OF MAGNESIUM: CPT

## 2025-03-31 PROCEDURE — 87799 DETECT AGENT NOS DNA QUANT: CPT

## 2025-04-01 LAB
BACTERIA UR CULT: NORMAL
BKV DNA SERPL NAA+PROBE-LOG#: NORMAL {LOG_COPIES}/ML
CMV DNA SERPL NAA+PROBE-LOG IU: NORMAL {LOG_IU}/ML
LABORATORY COMMENT REPORT: NOT DETECTED
LABORATORY COMMENT REPORT: NOT DETECTED

## 2025-04-02 LAB
ALLOSURE SCORE - KIDNEY: 0.09 %
CAREDX_ORDER_ID: NORMAL
CENTER_ORDER_ID: NORMAL
CLIENT SPECIMEN ID - ALLOSURE: NORMAL
DONOR RELATION - ALLOSURE: NORMAL
HLA RESULTS: NORMAL
HLA-A+B+C AB NFR SER: NORMAL %
HLA-DP+DQ+DR AB NFR SER: NORMAL %
NOTES - ALLOSURE: NORMAL
RELATIVE CHANGE VALUE - KIDNEY: NORMAL
TEST COMMENTS - ALLOSURE: NORMAL
TIME POST TX - ALLOSURE: NORMAL
TRANSPLANTED ORGAN - ALLOSURE: NORMAL
TX DATE - ALLOSURE/ALLOMAP: NORMAL
WP_ORDER_ID: NORMAL

## 2025-04-03 DIAGNOSIS — R79.0 LOW MAGNESIUM LEVEL: ICD-10-CM

## 2025-04-03 DIAGNOSIS — Z94.0 KIDNEY REPLACED BY TRANSPLANT (HHS-HCC): ICD-10-CM

## 2025-04-03 DIAGNOSIS — I15.9 SECONDARY HYPERTENSION: ICD-10-CM

## 2025-04-03 RX ORDER — LANOLIN ALCOHOL/MO/W.PET/CERES
1 CREAM (GRAM) TOPICAL DAILY
Qty: 90 TABLET | Refills: 0 | Status: SHIPPED | OUTPATIENT
Start: 2025-04-03

## 2025-04-05 DIAGNOSIS — Z94.0 KIDNEY REPLACED BY TRANSPLANT (HHS-HCC): ICD-10-CM

## 2025-04-07 RX ORDER — CARVEDILOL 25 MG/1
50 TABLET ORAL 2 TIMES DAILY
Qty: 120 TABLET | Refills: 0 | Status: SHIPPED | OUTPATIENT
Start: 2025-04-07

## 2025-04-14 ENCOUNTER — NURSE ONLY (OUTPATIENT)
Facility: HOSPITAL | Age: 75
End: 2025-04-14
Payer: COMMERCIAL

## 2025-04-14 DIAGNOSIS — Z94.0 KIDNEY REPLACED BY TRANSPLANT (HHS-HCC): ICD-10-CM

## 2025-04-14 LAB
ALBUMIN SERPL BCP-MCNC: 4.1 G/DL (ref 3.4–5)
ANION GAP SERPL CALC-SCNC: 10 MMOL/L (ref 10–20)
BUN SERPL-MCNC: 13 MG/DL (ref 6–23)
CALCIUM SERPL-MCNC: 9.6 MG/DL (ref 8.6–10.6)
CHLORIDE SERPL-SCNC: 102 MMOL/L (ref 98–107)
CO2 SERPL-SCNC: 28 MMOL/L (ref 21–32)
CREAT SERPL-MCNC: 0.87 MG/DL (ref 0.5–1.05)
EGFRCR SERPLBLD CKD-EPI 2021: 71 ML/MIN/1.73M*2
GLUCOSE SERPL-MCNC: 130 MG/DL (ref 74–99)
MAGNESIUM SERPL-MCNC: 1.88 MG/DL (ref 1.6–2.4)
PHOSPHATE SERPL-MCNC: 3 MG/DL (ref 2.5–4.9)
POTASSIUM SERPL-SCNC: 4 MMOL/L (ref 3.5–5.3)
SODIUM SERPL-SCNC: 136 MMOL/L (ref 136–145)
TACROLIMUS BLD-MCNC: 4.1 NG/ML

## 2025-04-14 PROCEDURE — 36415 COLL VENOUS BLD VENIPUNCTURE: CPT

## 2025-04-14 PROCEDURE — 83735 ASSAY OF MAGNESIUM: CPT

## 2025-04-14 PROCEDURE — 84100 ASSAY OF PHOSPHORUS: CPT

## 2025-04-14 PROCEDURE — 80197 ASSAY OF TACROLIMUS: CPT

## 2025-04-14 PROCEDURE — 87799 DETECT AGENT NOS DNA QUANT: CPT

## 2025-04-17 ENCOUNTER — SPECIALTY PHARMACY (OUTPATIENT)
Dept: PHARMACY | Facility: CLINIC | Age: 75
End: 2025-04-17

## 2025-04-17 PROCEDURE — RXMED WILLOW AMBULATORY MEDICATION CHARGE

## 2025-04-19 ENCOUNTER — PHARMACY VISIT (OUTPATIENT)
Dept: PHARMACY | Facility: CLINIC | Age: 75
End: 2025-04-19
Payer: COMMERCIAL

## 2025-04-25 ENCOUNTER — APPOINTMENT (OUTPATIENT)
Dept: OPHTHALMOLOGY | Facility: CLINIC | Age: 75
End: 2025-04-25
Payer: COMMERCIAL

## 2025-04-25 DIAGNOSIS — H25.813 COMBINED FORMS OF AGE-RELATED CATARACT, BILATERAL: Primary | ICD-10-CM

## 2025-04-25 PROCEDURE — 92014 COMPRE OPH EXAM EST PT 1/>: CPT | Performed by: OPHTHALMOLOGY

## 2025-04-25 ASSESSMENT — SLIT LAMP EXAM - LIDS
COMMENTS: GOOD POSITION
COMMENTS: GOOD POSITION

## 2025-04-25 ASSESSMENT — CONF VISUAL FIELD
OS_NORMAL: 1
OD_SUPERIOR_TEMPORAL_RESTRICTION: 0
OS_SUPERIOR_NASAL_RESTRICTION: 0
METHOD: COUNTING FINGERS
OS_SUPERIOR_TEMPORAL_RESTRICTION: 0
OS_INFERIOR_TEMPORAL_RESTRICTION: 0
OD_NORMAL: 1
OD_INFERIOR_TEMPORAL_RESTRICTION: 0
OD_INFERIOR_NASAL_RESTRICTION: 0
OS_INFERIOR_NASAL_RESTRICTION: 0
OD_SUPERIOR_NASAL_RESTRICTION: 0

## 2025-04-25 ASSESSMENT — ENCOUNTER SYMPTOMS
NEUROLOGICAL NEGATIVE: 0
CARDIOVASCULAR NEGATIVE: 0
ALLERGIC/IMMUNOLOGIC NEGATIVE: 0
RESPIRATORY NEGATIVE: 0
EYES NEGATIVE: 1
MUSCULOSKELETAL NEGATIVE: 0
PSYCHIATRIC NEGATIVE: 0
CONSTITUTIONAL NEGATIVE: 0
HEMATOLOGIC/LYMPHATIC NEGATIVE: 0
ENDOCRINE NEGATIVE: 0
GASTROINTESTINAL NEGATIVE: 0

## 2025-04-25 ASSESSMENT — TONOMETRY
OS_IOP_MMHG: 17
OD_IOP_MMHG: 15
IOP_METHOD: TONOPEN

## 2025-04-25 ASSESSMENT — REFRACTION_MANIFEST
OS_ADD: +2.25
METHOD_AUTOREFRACTION: 1
OD_ADD: +2.25
OD_CYLINDER: -1.75
OS_CYLINDER: -2.00
OD_SPHERE: +1.75
OD_CYLINDER: -2.00
OS_AXIS: 090
OD_AXIS: 080
OD_AXIS: 075
OS_AXIS: 085
OS_SPHERE: +1.75
OD_SPHERE: +1.75
OS_SPHERE: +1.75
OS_CYLINDER: -1.50

## 2025-04-25 ASSESSMENT — VISUAL ACUITY
OD_CC: 20/25
CORRECTION_TYPE: GLASSES
OS_CC+: -2
OS_CC: 20/25
METHOD: SNELLEN - LINEAR

## 2025-04-25 ASSESSMENT — CUP TO DISC RATIO
OS_RATIO: .3
OD_RATIO: .3

## 2025-04-25 ASSESSMENT — EXTERNAL EXAM - LEFT EYE: OS_EXAM: NORMAL

## 2025-04-25 ASSESSMENT — EXTERNAL EXAM - RIGHT EYE: OD_EXAM: NORMAL

## 2025-04-25 NOTE — PROGRESS NOTES
Assessment/Plan   Diagnoses and all orders for this visit:  Combined forms of age-related cataract, bilateral  Cataract not visually significant at this time. Discussed cataract surgery indications,20/50 or worse, glare dropping vision 2 lines or more and affecting activities of daily living  Observe for progression   Vision good in PG  Fu Dr Campoverde as scheduled

## 2025-04-28 ENCOUNTER — NURSE ONLY (OUTPATIENT)
Facility: HOSPITAL | Age: 75
End: 2025-04-28
Payer: COMMERCIAL

## 2025-04-28 DIAGNOSIS — Z94.0 KIDNEY REPLACED BY TRANSPLANT (HHS-HCC): ICD-10-CM

## 2025-04-28 LAB
ALBUMIN SERPL BCP-MCNC: 4.1 G/DL (ref 3.4–5)
ANION GAP SERPL CALC-SCNC: 11 MMOL/L (ref 10–20)
BUN SERPL-MCNC: 15 MG/DL (ref 6–23)
CALCIUM SERPL-MCNC: 10 MG/DL (ref 8.6–10.6)
CHLORIDE SERPL-SCNC: 105 MMOL/L (ref 98–107)
CO2 SERPL-SCNC: 26 MMOL/L (ref 21–32)
CREAT SERPL-MCNC: 0.89 MG/DL (ref 0.5–1.05)
EGFRCR SERPLBLD CKD-EPI 2021: 69 ML/MIN/1.73M*2
GLUCOSE SERPL-MCNC: 158 MG/DL (ref 74–99)
MAGNESIUM SERPL-MCNC: 1.76 MG/DL (ref 1.6–2.4)
PHOSPHATE SERPL-MCNC: 3.5 MG/DL (ref 2.5–4.9)
POTASSIUM SERPL-SCNC: 3.9 MMOL/L (ref 3.5–5.3)
SODIUM SERPL-SCNC: 138 MMOL/L (ref 136–145)
TACROLIMUS BLD-MCNC: 4.9 NG/ML

## 2025-04-28 PROCEDURE — 87799 DETECT AGENT NOS DNA QUANT: CPT

## 2025-04-28 PROCEDURE — 80069 RENAL FUNCTION PANEL: CPT

## 2025-04-28 PROCEDURE — 83735 ASSAY OF MAGNESIUM: CPT

## 2025-04-28 PROCEDURE — 80197 ASSAY OF TACROLIMUS: CPT

## 2025-04-28 PROCEDURE — 36415 COLL VENOUS BLD VENIPUNCTURE: CPT

## 2025-04-30 PROCEDURE — RXMED WILLOW AMBULATORY MEDICATION CHARGE

## 2025-05-01 NOTE — PROGRESS NOTES
Diagnosis:    -Severe Chronic Bilateral Hip Osteoarthritis (ICD-10: M16.9)    -Difficulty ambulating long distances due to bilateral hip pain and reduced mobility    Medical Necessity for Rollator:    I am writing to request the approval of a rollator for Jacklyn Ingram, who has been diagnosed with severe chronic bilateral hip osteoarthritis (OA). This condition significantly impairs her ability to walk without severe discomfort, particularly over long distances.    Clinical Justification:  Severity of Bilateral Hip Osteoarthritis: [Patient's Name] has been living with severe chronic bilateral hip OA, which is characterized by joint pain, stiffness, and limited range of motion. The patient experiences significant discomfort, especially with weight-bearing activities such as walking and standing for prolonged periods.    Impact on Mobility: Patient has difficulty ambulating long distances due to bilateral hip pain and is often unable to walk more than short distances without exacerbating her symptoms. This limited mobility negatively impacts daily activities such as grocery shopping, attending medical appointments, and walking in the community.    Functional Limitations: The patient's ability to engage in essential activities of daily living is compromised. [He/She] is at increased risk of falls due to unstable gait patterns and requires an assistive device to maintain independence and safety while walking.    Previous and Current Interventions: Conservative treatment for hip OA, including physical therapy, anti-inflammatory medications, and lifestyle modifications, has been insufficient to relieve pain and improve mobility. Despite these efforts, the patient's ability to ambulate long distances remains severely limited.    Necessity of a Rollator:  A rollator is medically necessary for the following reasons:    Support and Stability: A rollator will provide additional support, stability, and balance for the patient,  reducing the risk of falls and improving mobility.    Pain Relief: The rollator will allow the patient to offload some weight from the hips during ambulation, thereby decreasing pain and facilitating more effective movement over longer distances.    Enhanced [place]: With the rollator, [Patient's Name] will be able to safely ambulate longer distances, maintain independence in daily activities, and participate more fully in social, community, and functional activities.    Conclusion:  Due to the severe nature of patient's bilateral hip OA and the functional limitations caused by pain and stiffness, a rollator is medically necessary to improve her mobility, reduce pain, and enhance safety during ambulation. This device is a critical tool to promote [his/her] independence and overall quality of life.    I respectfully request that the insurance company approve coverage for the rollator as it is essential for [Patient's Name]'s ongoing care and well-being.    Joann Mayo, DO

## 2025-05-02 ENCOUNTER — SPECIALTY PHARMACY (OUTPATIENT)
Dept: PHARMACY | Facility: CLINIC | Age: 75
End: 2025-05-02

## 2025-05-09 ENCOUNTER — PHARMACY VISIT (OUTPATIENT)
Dept: PHARMACY | Facility: CLINIC | Age: 75
End: 2025-05-09
Payer: COMMERCIAL

## 2025-05-13 ENCOUNTER — NURSE ONLY (OUTPATIENT)
Facility: HOSPITAL | Age: 75
End: 2025-05-13
Payer: COMMERCIAL

## 2025-05-13 DIAGNOSIS — Z94.0 KIDNEY REPLACED BY TRANSPLANT (HHS-HCC): ICD-10-CM

## 2025-05-13 LAB — TACROLIMUS BLD-MCNC: 5.5 NG/ML

## 2025-05-13 PROCEDURE — 80197 ASSAY OF TACROLIMUS: CPT

## 2025-05-13 PROCEDURE — 87799 DETECT AGENT NOS DNA QUANT: CPT

## 2025-05-13 PROCEDURE — 36415 COLL VENOUS BLD VENIPUNCTURE: CPT

## 2025-05-14 DIAGNOSIS — Z94.0 KIDNEY REPLACED BY TRANSPLANT (HHS-HCC): ICD-10-CM

## 2025-05-15 RX ORDER — CARVEDILOL 25 MG/1
50 TABLET ORAL 2 TIMES DAILY
Qty: 120 TABLET | Refills: 0 | Status: SHIPPED | OUTPATIENT
Start: 2025-05-15

## 2025-05-21 ENCOUNTER — APPOINTMENT (OUTPATIENT)
Dept: ENDOCRINOLOGY | Facility: CLINIC | Age: 75
End: 2025-05-21
Payer: COMMERCIAL

## 2025-05-21 VITALS
WEIGHT: 207 LBS | HEART RATE: 87 BPM | DIASTOLIC BLOOD PRESSURE: 62 MMHG | BODY MASS INDEX: 34.49 KG/M2 | SYSTOLIC BLOOD PRESSURE: 141 MMHG | HEIGHT: 65 IN

## 2025-05-21 DIAGNOSIS — R73.9 STEROID-INDUCED HYPERGLYCEMIA: ICD-10-CM

## 2025-05-21 DIAGNOSIS — Z79.4 TYPE 2 DIABETES MELLITUS WITH HYPERGLYCEMIA, WITH LONG-TERM CURRENT USE OF INSULIN: Primary | ICD-10-CM

## 2025-05-21 DIAGNOSIS — E11.65 TYPE 2 DIABETES MELLITUS WITH HYPERGLYCEMIA, WITH LONG-TERM CURRENT USE OF INSULIN: Primary | ICD-10-CM

## 2025-05-21 DIAGNOSIS — T38.0X5A STEROID-INDUCED HYPERGLYCEMIA: ICD-10-CM

## 2025-05-21 DIAGNOSIS — Z79.4 TYPE 2 DIABETES MELLITUS WITHOUT COMPLICATION, WITH LONG-TERM CURRENT USE OF INSULIN: ICD-10-CM

## 2025-05-21 DIAGNOSIS — E11.9 TYPE 2 DIABETES MELLITUS WITHOUT COMPLICATION, WITH LONG-TERM CURRENT USE OF INSULIN: ICD-10-CM

## 2025-05-21 PROCEDURE — 3008F BODY MASS INDEX DOCD: CPT | Performed by: STUDENT IN AN ORGANIZED HEALTH CARE EDUCATION/TRAINING PROGRAM

## 2025-05-21 PROCEDURE — 1159F MED LIST DOCD IN RCRD: CPT | Performed by: STUDENT IN AN ORGANIZED HEALTH CARE EDUCATION/TRAINING PROGRAM

## 2025-05-21 PROCEDURE — G2211 COMPLEX E/M VISIT ADD ON: HCPCS | Performed by: STUDENT IN AN ORGANIZED HEALTH CARE EDUCATION/TRAINING PROGRAM

## 2025-05-21 PROCEDURE — 3062F POS MACROALBUMINURIA REV: CPT | Performed by: STUDENT IN AN ORGANIZED HEALTH CARE EDUCATION/TRAINING PROGRAM

## 2025-05-21 PROCEDURE — 3078F DIAST BP <80 MM HG: CPT | Performed by: STUDENT IN AN ORGANIZED HEALTH CARE EDUCATION/TRAINING PROGRAM

## 2025-05-21 PROCEDURE — 1036F TOBACCO NON-USER: CPT | Performed by: STUDENT IN AN ORGANIZED HEALTH CARE EDUCATION/TRAINING PROGRAM

## 2025-05-21 PROCEDURE — 3077F SYST BP >= 140 MM HG: CPT | Performed by: STUDENT IN AN ORGANIZED HEALTH CARE EDUCATION/TRAINING PROGRAM

## 2025-05-21 PROCEDURE — 4010F ACE/ARB THERAPY RXD/TAKEN: CPT | Performed by: STUDENT IN AN ORGANIZED HEALTH CARE EDUCATION/TRAINING PROGRAM

## 2025-05-21 PROCEDURE — 99214 OFFICE O/P EST MOD 30 MIN: CPT | Performed by: STUDENT IN AN ORGANIZED HEALTH CARE EDUCATION/TRAINING PROGRAM

## 2025-05-21 RX ORDER — INSULIN LISPRO 100 [IU]/ML
INJECTION, SOLUTION INTRAVENOUS; SUBCUTANEOUS
Qty: 15 ML | Refills: 11 | Status: SHIPPED | OUTPATIENT
Start: 2025-05-21

## 2025-05-21 RX ORDER — BLOOD-GLUCOSE SENSOR
EACH MISCELLANEOUS
Qty: 9 EACH | Refills: 3 | Status: SHIPPED | OUTPATIENT
Start: 2025-05-21

## 2025-05-21 RX ORDER — DAPAGLIFLOZIN 10 MG/1
10 TABLET, FILM COATED ORAL DAILY
Qty: 90 TABLET | Refills: 2 | Status: SHIPPED | OUTPATIENT
Start: 2025-05-21

## 2025-05-21 RX ORDER — SEMAGLUTIDE 2.68 MG/ML
2 INJECTION, SOLUTION SUBCUTANEOUS WEEKLY
Qty: 9 ML | Refills: 3 | Status: SHIPPED | OUTPATIENT
Start: 2025-05-21

## 2025-05-23 ENCOUNTER — OFFICE VISIT (OUTPATIENT)
Facility: HOSPITAL | Age: 75
End: 2025-05-23
Payer: COMMERCIAL

## 2025-05-23 VITALS
HEART RATE: 92 BPM | HEIGHT: 65 IN | OXYGEN SATURATION: 98 % | BODY MASS INDEX: 35.16 KG/M2 | DIASTOLIC BLOOD PRESSURE: 80 MMHG | TEMPERATURE: 97 F | WEIGHT: 211 LBS | SYSTOLIC BLOOD PRESSURE: 152 MMHG

## 2025-05-23 DIAGNOSIS — Z78.0 ENCOUNTER FOR OSTEOPOROSIS SCREENING IN ASYMPTOMATIC POSTMENOPAUSAL PATIENT: ICD-10-CM

## 2025-05-23 DIAGNOSIS — Z94.0 KIDNEY REPLACED BY TRANSPLANT (HHS-HCC): ICD-10-CM

## 2025-05-23 DIAGNOSIS — I15.9 SECONDARY HYPERTENSION: ICD-10-CM

## 2025-05-23 DIAGNOSIS — Z13.820 SCREENING FOR OSTEOPOROSIS: Primary | ICD-10-CM

## 2025-05-23 DIAGNOSIS — Z13.820 ENCOUNTER FOR OSTEOPOROSIS SCREENING IN ASYMPTOMATIC POSTMENOPAUSAL PATIENT: ICD-10-CM

## 2025-05-23 DIAGNOSIS — R79.0 LOW MAGNESIUM LEVEL: ICD-10-CM

## 2025-05-23 DIAGNOSIS — Z76.0 MEDICATION REFILL: ICD-10-CM

## 2025-05-23 DIAGNOSIS — Z12.31 ENCOUNTER FOR SCREENING MAMMOGRAM FOR MALIGNANT NEOPLASM OF BREAST: ICD-10-CM

## 2025-05-23 PROBLEM — D22.70 MELANOCYTIC NEVI OF UNSPECIFIED LOWER LIMB, INCLUDING HIP: Status: ACTIVE | Noted: 2023-02-21

## 2025-05-23 PROBLEM — L82.1 OTHER SEBORRHEIC KERATOSIS: Status: ACTIVE | Noted: 2023-02-21

## 2025-05-23 PROBLEM — E16.2 HYPOGLYCEMIA: Status: ACTIVE | Noted: 2025-05-23

## 2025-05-23 PROBLEM — R09.81 CONGESTION OF PARANASAL SINUS: Status: ACTIVE | Noted: 2025-05-23

## 2025-05-23 PROBLEM — E87.70 EDEMA DUE TO HYPERVOLEMIA: Status: ACTIVE | Noted: 2025-05-23

## 2025-05-23 PROBLEM — E83.42 HYPOMAGNESEMIA: Status: ACTIVE | Noted: 2025-05-23

## 2025-05-23 PROBLEM — D22.5 MELANOCYTIC NEVI OF TRUNK: Status: ACTIVE | Noted: 2023-02-17

## 2025-05-23 PROBLEM — L85.3 XEROSIS CUTIS: Status: ACTIVE | Noted: 2023-02-17

## 2025-05-23 PROBLEM — M25.569 KNEE PAIN: Status: ACTIVE | Noted: 2023-04-02

## 2025-05-23 PROBLEM — B30.9 VIRAL CONJUNCTIVITIS: Status: ACTIVE | Noted: 2025-05-23

## 2025-05-23 PROBLEM — D22.62 MELANOCYTIC NEVI OF LEFT UPPER LIMB, INCLUDING SHOULDER: Status: ACTIVE | Noted: 2023-02-21

## 2025-05-23 PROBLEM — D22.39 MELANOCYTIC NEVI OF OTHER PARTS OF FACE: Status: ACTIVE | Noted: 2023-02-21

## 2025-05-23 PROBLEM — Z86.79 HISTORY OF HYPERTENSION: Status: ACTIVE | Noted: 2025-05-23

## 2025-05-23 PROBLEM — E66.9 OBESITY WITH BODY MASS INDEX 30 OR GREATER: Status: ACTIVE | Noted: 2025-05-23

## 2025-05-23 PROBLEM — L57.9 SKIN CHANGES DUE TO CHRONIC EXPOSURE TO NONIONIZING RADIATION, UNSPECIFIED: Status: ACTIVE | Noted: 2023-02-17

## 2025-05-23 PROBLEM — E83.52 HYPERCALCEMIA: Status: ACTIVE | Noted: 2025-05-23

## 2025-05-23 PROBLEM — N89.8 VAGINAL DISCHARGE: Status: ACTIVE | Noted: 2025-05-23

## 2025-05-23 RX ORDER — LANOLIN ALCOHOL/MO/W.PET/CERES
1 CREAM (GRAM) TOPICAL DAILY
Qty: 90 TABLET | Refills: 3 | Status: SHIPPED | OUTPATIENT
Start: 2025-05-23

## 2025-05-23 RX ORDER — ASPIRIN 325 MG
325 TABLET, DELAYED RELEASE (ENTERIC COATED) ORAL DAILY
Qty: 90 TABLET | Refills: 3 | Status: SHIPPED | OUTPATIENT
Start: 2025-05-23 | End: 2026-05-23

## 2025-05-23 RX ORDER — CARVEDILOL 25 MG/1
50 TABLET ORAL 2 TIMES DAILY
Qty: 360 TABLET | Refills: 3 | Status: SHIPPED | OUTPATIENT
Start: 2025-05-23 | End: 2026-05-23

## 2025-05-23 ASSESSMENT — PAIN SCALES - GENERAL: PAINLEVEL_OUTOF10: 0-NO PAIN

## 2025-05-23 NOTE — PROGRESS NOTES
Subjective   Reason for Visit: Jacklyn Villa is an 74 y.o. female here for a Medicare Wellness visit.       # Health Maintenance  - Dental Care: Established// brushes teeth 2x  // denies current tooth pain  - Vision:Established // corrective devices: yes // recent vision: cataracts BL  - Hearing: denies recent hearing loss   - Diet: Balanced, meat & broccoli 8 yr grandson, grapes, tangerines, watermelon.  Rye Bread. Beverages: diet soda & water, coffee  - Exercise: Not   - Weight: stable,   - Smoking: never a smoker  - EtOH: Alcohol Use: No, patient does not drink alcohol.  - Illicit substances: denied.  - Employment: Retired  - Living Situation: Adult Son & grandson  - Colon CA: last prior screening Colonoscopy in 2023 // family h/o colon ca? No    WOMEN  - Menstrual Status: Menopausal  - Pregnancy history: No obstetric history on file.  - Bladder dysfunction? has had couple episodes of incontinence, wears liners  - Cervical CA: last prior pap 10/2023 // h/o abnormal pap? No  - Breast CA: last prior mammo 2023 // h/o abnormal mammo? No          ADLs (Activities of Daily Living)  Bathing: Are you able to bathe yourself independently? YES  Dressing: Do you have any difficulty putting on or taking off your clothes?YES  Eating: Can you feed yourself without assistance? YES  Toileting: Are you able to use the toilet without help? YES, wears depends  Mobility: Can you move around your home safely? Rollator  Transferring: Are you able to get in and out of bed or a chair independently? YES    IADLs (Instrumental Activities of Daily Living)  Managing Finances: Are you able to handle your own finances, such as paying bills or managing your budget? YES  Medication Management: Can you manage your medications on your own, including remembering to take them at the right times? YES  Transportation: Are you able to drive or use public transportation independently? YES, drive  Shopping: Can you go grocery shopping or run errands  "without assistance? YES    Falls: 1 fall about 3 months when leg fell asleep    PHQ2: negative      FamHx:  Family History[1]    PMH:  Problem List[2]  Medical History[3]    SurgHx:  Surgical History[4]    Meds:  Current Outpatient Medications   Medication Instructions    amLODIPine (NORVASC) 10 mg, oral, Daily    aspirin 325 mg, oral, Daily    carvedilol (COREG) 50 mg, oral, 2 times daily    clindamycin (Cleocin T) 1 % lotion Clindamycin Phosphate 1 % External Lotion apply topically to affected areas of the face twice daily Quantity: 60 Refills: 0 Ordered: 22-Feb-2023 DO Start : 22-Feb-2023 Active    dapagliflozin propanediol (FARXIGA) 10 mg, oral, Daily    Dexcom G7 Sensor device Change sensor every 10 days    Envarsus XR 2 mg, oral, Daily    insulin lispro (HumaLOG) 100 unit/mL pen Siding scale as needed with meals for sugars >200, up to 30 units daily    insulin NPH, Isophane, (HumuLIN N,NovoLIN N) 100 unit/mL (3 mL) pen 30 units with your morning dose of prednisone    ketoconazole (NIZOral) 2 % shampoo     losartan (COZAAR) 50 mg, oral, 2 times daily    lovastatin (MEVACOR) 40 mg, oral, Nightly    magnesium oxide (Mag-Ox) 400 mg (241.3 mg elemental) tablet 1 tablet, oral, Daily    mycophenolate (CELLCEPT) 500 mg, oral, 2 times daily    Ozempic 2 mg, subcutaneous, Weekly    pen needle, diabetic 32 gauge x 3/16\" needle USE AS DIRECTED TO INJECT INSULIN FOUR TIMES DAILY    predniSONE (Deltasone) 5 mg tablet TAKE ONE (1) TABLET BY MOUTH ONCE DAILY.       Patient care team:  Patient Care Team:  Joann Mayo DO as PCP - General     ROS:  12 pt reviewed negative    Objective   Vitals:  /80 (BP Location: Left arm, Patient Position: Sitting)   Pulse 92   Temp 36.1 °C (97 °F) (Temporal)   Ht 1.651 m (5' 5\")   Wt 95.7 kg (211 lb)   SpO2 98%   BMI 35.11 kg/m²       Physical Exam  Constitutional:       General: She is not in acute distress.  HENT:      Head: Normocephalic and atraumatic.      Nose: Nose " normal.      Mouth/Throat:      Mouth: Mucous membranes are moist.   Eyes:      Conjunctiva/sclera: Conjunctivae normal.   Cardiovascular:      Rate and Rhythm: Normal rate and regular rhythm.      Heart sounds: Murmur heard.   Pulmonary:      Effort: Pulmonary effort is normal.      Breath sounds: Normal breath sounds.   Musculoskeletal:      Cervical back: Neck supple.      Comments: CGM on Right Upper Arm, Dialysis fistula on Left upper arm   Skin:     General: Skin is warm and dry.      Capillary Refill: Capillary refill takes 2 to 3 seconds.   Neurological:      General: No focal deficit present.      Mental Status: She is alert.   Psychiatric:         Mood and Affect: Mood normal.         Behavior: Behavior normal.         Social Drivers of Health     Tobacco Use: Medium Risk (5/23/2025)    Patient History     Smoking Tobacco Use: Former     Smokeless Tobacco Use: Never     Passive Exposure: Not on file   Alcohol Use: Not At Risk (5/6/2024)    AUDIT-C     Frequency of Alcohol Consumption: Never     Average Number of Drinks: Patient does not drink     Frequency of Binge Drinking: Never   Financial Resource Strain: Low Risk  (5/8/2024)    Overall Financial Resource Strain (CARDIA)     Difficulty of Paying Living Expenses: Not hard at all   Food Insecurity: No Food Insecurity (9/23/2024)    Hunger Vital Sign     Worried About Running Out of Food in the Last Year: Never true     Ran Out of Food in the Last Year: Never true   Transportation Needs: No Transportation Needs (5/8/2024)    PRAPARE - Transportation     Lack of Transportation (Medical): No     Lack of Transportation (Non-Medical): No   Physical Activity: Not on file   Stress: Not on file   Social Connections: Not on file   Intimate Partner Violence: Not on file   Depression: Not at risk (5/23/2024)    PHQ-2     PHQ-2 Score: 0   Housing Stability: Low Risk  (5/8/2024)    Housing Stability Vital Sign     Unable to Pay for Housing in the Last Year: No      Number of Places Lived in the Last Year: 1     Unstable Housing in the Last Year: No   Utilities: Not on file   Digital Equity: Not on file   Health Literacy: Not on file      HOME Blood Pressures Measurements            Assessment/Plan     74 y.o. female here for Medicare Annual Wellness Exam.    IMMUNIZATIONS  Immunization History   Administered Date(s) Administered    Flu vaccine (IIV4), preservative free *Check age/dose* 11/10/2016, 12/06/2018    Flu vaccine, quadrivalent, high-dose, preservative free, age 65y+ (FLUZONE) 10/19/2021, 10/27/2023    Flu vaccine, trivalent, preservative free, HIGH-DOSE, age 65y+ (Fluzone) 10/07/2019, 11/14/2024    Influenza, Seasonal, Quadrivalent, Adjuvanted 10/16/2020, 10/12/2022    Influenza, seasonal, injectable 10/12/2022    Moderna COVID-19 vaccine, 12 years and older (50mcg/0.5mL)(Spikevax) 10/27/2023, 11/14/2024    Moderna COVID-19 vaccine, bivalent, blue cap/gray label *Check age/dose* 10/17/2022    Moderna SARS-CoV-2 Vaccination 02/08/2021, 03/08/2021, 12/17/2021, 10/17/2022    Pneumococcal Conjugate PCV 7 1950    Pneumococcal conjugate vaccine, 13-valent (PREVNAR 13) 11/10/2016    Pneumococcal polysaccharide vaccine, 23-valent, age 2 years and older (PNEUMOVAX 23) 12/06/2018    Tdap vaccine, age 7 year and older (BOOSTRIX, ADACEL) 02/09/2017    Zoster vaccine, recombinant, adult (SHINGRIX) 03/19/2020, 08/10/2020    Zoster, live 02/09/2017       Ability to perform ADLs: independent  Fall risk: low  Hearing impairment: absent  Home safety risk factors: Home Safety Risk Factors: None    - Flu vaccine: recommended annually,   - COVID vaccine: recommended completion of primary series and recommended boosters,   - Tdap: next due 2027  - Shingrix (50+): UTD  - Declined Hep A, Hep B (thinks she may have already given per transplant doctor)  - Lipid Panel (35M,45F): UTD  - DM screening: active order  - HTN screening: elevated, but home recordings at goal. Did not recheck  because CGM on R UE and HD fistula on L UE  - Food Insecurity screen: negative  - Depression: PHQ-2 negative  - Last Dental: recommended follow up every 6mo   - Last Eye exam: recommended follow up annually   - Colonoscopy (45-75): DUE 2028  - Breast CA screening (40-74F): ordered  - Osteoporosis (65+F): Chronic prednisone use, ordered DEXA since none on chart review    Problem List Items Addressed This Visit       Kidney replaced by transplant ([facility])    Relevant Medications    magnesium oxide (Mag-Ox) 400 mg (241.3 mg elemental) tablet    aspirin 325 mg EC tablet    Other Relevant Orders    Albumin-Creatinine Ratio, Urine Random     Other Visit Diagnoses         Screening for osteoporosis    -  Primary    Relevant Orders    XR DEXA bone density      Encounter for screening mammogram for malignant neoplasm of breast        Relevant Orders    BI mammo bilateral screening tomosynthesis      Secondary hypertension        Relevant Medications    carvedilol (Coreg) 25 mg tablet    Other Relevant Orders    Follow Up In Primary Care      Low magnesium level        Relevant Medications    magnesium oxide (Mag-Ox) 400 mg (241.3 mg elemental) tablet      Medication refill          Encounter for osteoporosis screening in asymptomatic postmenopausal patient        Relevant Orders    XR DEXA bone density            Patient seen and discussed with attending physician Dr. Jeronimo.    RTC in 3 months for follow up on HTN and establish care with new physician, or earlier as needed.    Reviewed and approved by ISIDRO ROB on 5/23/25 at 9:18 AM.         [1]   Family History  Problem Relation Name Age of Onset    Ovarian cancer Mother      Hypertension Sister     [2]   Patient Active Problem List  Diagnosis    Abnormal mammogram    Acquired lymphopenia    Adenomatous polyp of colon    Anemia    Bilateral hip pain    Arthritis of knee    Astigmatism    Hyperopia    Myopia with presbyopia    Refraction error    AVF (arteriovenous  fistula)    Calcification of artery    Benign hypertension    Knee pain    Pain in both knees    Bilateral carotid bruits    CKD (chronic kidney disease), stage V (Multi)    Closed fracture of distal end of left fibula with routine healing    Closed fracture of distal end of right fibula, initial encounter    CMV (cytomegalovirus) (Multi)    Cold intolerance    Combined form of age-related cataract, both eyes    Combined hyperlipidemia associated with type 2 diabetes mellitus    Diabetic retinopathy (Multi)    Diabetic retinopathy, nonproliferative, mild    Dry eye syndrome of both eyes    Dry skin    Easy bruising    Epistaxis    Gastroesophageal reflux disease    Gout    Hyperuricemia    HLD (hyperlipidemia)    Hyperparathyroidism, secondary (Multi)    Hypervolemia    Immunosuppression    Ischemia of retina of both eyes    Kidney replaced by transplant ([facility])    Left knee pain    Meibomian gland dysfunction (MGD) of both eyes    Osteoarthritis of patellofemoral joints of both knees    Pink eye disease of both eyes    Post-operative pain    Proteinuria    Renal osteodystrophy    Right ankle pain    Scleral injection    Steroid-induced hyperglycemia    Tubular adenoma of colon    Type 2 diabetes mellitus with hyperglycemia, with long-term current use of insulin    Vitamin D insufficiency    Benign hypertensive heart and kidney disease and end stage renal disease    Complication of arteriovenous dialysis fistula    Condition not found    Edema of right lower extremity    Edema of left lower extremity    Hyperkalemia    Leukopenia    Peripheral vascular disease of extremity with claudication    Renal artery stenosis    Hyperglycemia    Retinal vascular occlusion    Retinal neovascularization of both eyes    Congestion of paranasal sinus    Edema due to hypervolemia    History of hypertension    Hypercalcemia    Hypoglycemia    Hypomagnesemia    Melanocytic nevi of left upper limb, including shoulder    Melanocytic  nevi of trunk    Melanocytic nevi of unspecified lower limb, including hip    Melanocytic nevi of other parts of face    Obesity with body mass index 30 or greater    Other seborrheic keratosis    Skin changes due to chronic exposure to nonionizing radiation, unspecified    Type 2 diabetes mellitus without retinopathy (Multi)    Vaginal discharge    Viral conjunctivitis    Xerosis cutis   [3]   Past Medical History:  Diagnosis Date    Asymptomatic menopausal state 06/30/2021    Post-menopausal    Chronic kidney disease, stage 3 unspecified (Multi) 09/16/2022    CKD (chronic kidney disease), stage III    Chronic kidney disease, stage 4 (severe) (Multi) 10/17/2019    CKD (chronic kidney disease), stage IV    Combined forms of age-related cataract, left eye 07/08/2020    Combined form of age-related cataract, left eye    Combined forms of age-related cataract, right eye 07/08/2020    Combined form of age-related cataract, right eye    Dry eyes     Encounter for gynecological examination (general) (routine) without abnormal findings 11/08/2021    Well female exam with routine gynecological exam    Encounter for immunization 02/09/2017    Need for diphtheria-tetanus-pertussis (Tdap) vaccine    Encounter for immunization 02/09/2017    Need for shingles vaccine    Encounter for other preprocedural examination 09/28/2021    Pre-transplant evaluation for kidney transplant    Encounter for screening for malignant neoplasm of cervix 05/21/2018    Screening for cervical cancer    Encounter for screening for malignant neoplasm of colon 11/21/2019    Screening for colon cancer    Encounter for screening for respiratory tuberculosis 01/21/2021    Screening for tuberculosis    Impacted cerumen, right ear 02/11/2016    Impacted cerumen of right ear    Need for assistance at home and no other household member able to render care     Need for home health care    Nonproliferative diabetic retinopathy of both eyes     Personal history  of other diseases of the circulatory system     History of hypertension    Personal history of other diseases of the female genital tract 05/21/2018    History of vaginal discharge    Personal history of other diseases of the nervous system and sense organs 01/28/2020    History of viral conjunctivitis    Personal history of other diseases of the nervous system and sense organs 02/11/2016    History of impacted cerumen    Personal history of other diseases of the nervous system and sense organs 02/11/2016    History of impacted cerumen    Personal history of other diseases of the respiratory system 01/28/2020    History of paranasal sinus congestion    Personal history of other drug therapy 11/09/2017    History of influenza vaccination    Personal history of other endocrine, nutritional and metabolic disease 08/19/2022    History of diabetes mellitus    Personal history of other endocrine, nutritional and metabolic disease 09/09/2022    History of hyperlipidemia    Personal history of other endocrine, nutritional and metabolic disease 06/30/2021    History of hyperlipidemia    Personal history of other medical treatment 06/29/2021    History of screening mammography    Personal history of other medical treatment 09/12/2019    History of screening mammography    Retinal ischemia     Type 1 diabetes mellitus with hypoglycemia with coma 05/27/2022    Type 1 diabetes mellitus with hypoglycemic coma    Type 2 diabetes mellitus without complications 01/16/2018    Diabetes mellitus type 2 without retinopathy    Type 2 DM w/mild nonproliferative diabetic retinop w/macular edema    [4]   Past Surgical History:  Procedure Laterality Date    OTHER SURGICAL HISTORY  09/15/2022    Kidney transplantation    OTHER SURGICAL HISTORY  06/05/2020    Colonoscopy    OTHER SURGICAL HISTORY  06/05/2020    Arteriovenous fistula creation procedure    OTHER SURGICAL HISTORY  10/17/2019    Biopsy Renal

## 2025-05-27 ENCOUNTER — NURSE ONLY (OUTPATIENT)
Facility: HOSPITAL | Age: 75
End: 2025-05-27
Payer: COMMERCIAL

## 2025-05-27 LAB
CREAT UR-MCNC: 16.1 MG/DL (ref 20–320)
EST. AVERAGE GLUCOSE BLD GHB EST-MCNC: 154 MG/DL
HBA1C MFR BLD: 7 % (ref ?–5.7)
MICROALBUMIN UR-MCNC: <7 MG/L
MICROALBUMIN/CREAT UR: ABNORMAL MG/G{CREAT}

## 2025-05-27 PROCEDURE — 83036 HEMOGLOBIN GLYCOSYLATED A1C: CPT | Performed by: STUDENT IN AN ORGANIZED HEALTH CARE EDUCATION/TRAINING PROGRAM

## 2025-05-27 PROCEDURE — 82043 UR ALBUMIN QUANTITATIVE: CPT | Performed by: FAMILY MEDICINE

## 2025-05-28 DIAGNOSIS — I70.8 ATHEROSCLEROSIS OF AORTIC BIFURCATION AND COMMON ILIAC ARTERIES: ICD-10-CM

## 2025-05-28 DIAGNOSIS — I70.0 ATHEROSCLEROSIS OF AORTIC BIFURCATION AND COMMON ILIAC ARTERIES: ICD-10-CM

## 2025-05-30 RX ORDER — LOVASTATIN 40 MG/1
40 TABLET ORAL NIGHTLY
Qty: 90 TABLET | Refills: 3 | Status: SHIPPED | OUTPATIENT
Start: 2025-05-30

## 2025-06-11 ENCOUNTER — NURSE ONLY (OUTPATIENT)
Facility: HOSPITAL | Age: 75
End: 2025-06-11
Payer: COMMERCIAL

## 2025-06-11 DIAGNOSIS — Z94.0 KIDNEY REPLACED BY TRANSPLANT (HHS-HCC): ICD-10-CM

## 2025-06-11 LAB — TACROLIMUS BLD-MCNC: 3.7 NG/ML

## 2025-06-11 PROCEDURE — 36415 COLL VENOUS BLD VENIPUNCTURE: CPT

## 2025-06-11 PROCEDURE — 80197 ASSAY OF TACROLIMUS: CPT

## 2025-06-11 PROCEDURE — 87799 DETECT AGENT NOS DNA QUANT: CPT

## 2025-06-12 ENCOUNTER — TELEPHONE (OUTPATIENT)
Facility: HOSPITAL | Age: 75
End: 2025-06-12
Payer: COMMERCIAL

## 2025-06-12 DIAGNOSIS — Z94.0 KIDNEY REPLACED BY TRANSPLANT (HHS-HCC): ICD-10-CM

## 2025-06-12 NOTE — TELEPHONE ENCOUNTER
Reviewed patients labs with Dr. Bhatia. Patients Tacrolimus level was 3.7. Patients Envarsus increased to 3mg. Call placed to the patient, left voicemail.     Tac-3.7 (5.5,4.9)  Envarsus 2mg since March  Increased to Envarsus 3mg

## 2025-06-13 ENCOUNTER — TELEPHONE (OUTPATIENT)
Facility: HOSPITAL | Age: 75
End: 2025-06-13
Payer: COMMERCIAL

## 2025-06-13 LAB
BKV DNA SERPL NAA+PROBE-ACNC: 30 IU/ML (ref ?–22)
BKV DNA SERPL NAA+PROBE-LOG#: 1.48 LOG IU/ML
LABORATORY COMMENT REPORT: DETECTED

## 2025-06-13 NOTE — TELEPHONE ENCOUNTER
Left second voicemail for the patient. Instructed on new dose of Envarsus 3mg daily and encouraged her to call the office with any questions or concerns.

## 2025-06-23 DIAGNOSIS — E11.65 TYPE 2 DIABETES MELLITUS WITH HYPERGLYCEMIA, WITH LONG-TERM CURRENT USE OF INSULIN: ICD-10-CM

## 2025-06-23 DIAGNOSIS — Z79.4 TYPE 2 DIABETES MELLITUS WITH HYPERGLYCEMIA, WITH LONG-TERM CURRENT USE OF INSULIN: ICD-10-CM

## 2025-06-25 RX ORDER — DAPAGLIFLOZIN 10 MG/1
TABLET, FILM COATED ORAL
Qty: 90 TABLET | Refills: 2 | Status: SHIPPED | OUTPATIENT
Start: 2025-06-25

## 2025-06-29 DIAGNOSIS — E11.65 TYPE 2 DIABETES MELLITUS WITH HYPERGLYCEMIA, WITH LONG-TERM CURRENT USE OF INSULIN: ICD-10-CM

## 2025-06-29 DIAGNOSIS — Z79.4 TYPE 2 DIABETES MELLITUS WITH HYPERGLYCEMIA, WITH LONG-TERM CURRENT USE OF INSULIN: ICD-10-CM

## 2025-06-30 ENCOUNTER — SPECIALTY PHARMACY (OUTPATIENT)
Dept: PHARMACY | Facility: CLINIC | Age: 75
End: 2025-06-30

## 2025-06-30 ENCOUNTER — NURSE ONLY (OUTPATIENT)
Facility: HOSPITAL | Age: 75
End: 2025-06-30
Payer: COMMERCIAL

## 2025-06-30 DIAGNOSIS — Z94.0 KIDNEY REPLACED BY TRANSPLANT (HHS-HCC): ICD-10-CM

## 2025-06-30 PROCEDURE — RXMED WILLOW AMBULATORY MEDICATION CHARGE

## 2025-06-30 RX ORDER — PEN NEEDLE, DIABETIC 32GX 5/32"
NEEDLE, DISPOSABLE MISCELLANEOUS
Qty: 400 EACH | Refills: 1 | Status: SHIPPED | OUTPATIENT
Start: 2025-06-30

## 2025-07-01 ENCOUNTER — PHARMACY VISIT (OUTPATIENT)
Dept: PHARMACY | Facility: CLINIC | Age: 75
End: 2025-07-01
Payer: COMMERCIAL

## 2025-07-02 LAB
ALLOSURE SCORE - KIDNEY: 0.1 %
CAREDX_ORDER_ID: NORMAL
CENTER_ORDER_ID: NORMAL
CLIENT SPECIMEN ID - ALLOSURE: NORMAL
DONOR RELATION - ALLOSURE: NORMAL
NOTES - ALLOSURE: NORMAL
RELATIVE CHANGE VALUE - KIDNEY: NORMAL
TEST COMMENTS - ALLOSURE: NORMAL
TIME POST TX - ALLOSURE: NORMAL
TRANSPLANTED ORGAN - ALLOSURE: NORMAL
TX DATE - ALLOSURE/ALLOMAP: NORMAL
WP_ORDER_ID: NORMAL

## 2025-07-08 ENCOUNTER — NURSE ONLY (OUTPATIENT)
Facility: HOSPITAL | Age: 75
End: 2025-07-08
Payer: COMMERCIAL

## 2025-07-08 DIAGNOSIS — Z79.4 TYPE 2 DIABETES MELLITUS WITH HYPERGLYCEMIA, WITH LONG-TERM CURRENT USE OF INSULIN: ICD-10-CM

## 2025-07-08 DIAGNOSIS — Z94.0 KIDNEY REPLACED BY TRANSPLANT (HHS-HCC): ICD-10-CM

## 2025-07-08 DIAGNOSIS — E11.65 TYPE 2 DIABETES MELLITUS WITH HYPERGLYCEMIA, WITH LONG-TERM CURRENT USE OF INSULIN: ICD-10-CM

## 2025-07-08 LAB — TACROLIMUS BLD-MCNC: 5 NG/ML

## 2025-07-08 PROCEDURE — 80197 ASSAY OF TACROLIMUS: CPT

## 2025-07-08 PROCEDURE — 36415 COLL VENOUS BLD VENIPUNCTURE: CPT

## 2025-07-18 ENCOUNTER — APPOINTMENT (OUTPATIENT)
Dept: OPHTHALMOLOGY | Facility: CLINIC | Age: 75
End: 2025-07-18
Payer: COMMERCIAL

## 2025-07-18 DIAGNOSIS — E08.3213 MILD NONPROLIFERATIVE DIABETIC RETINOPATHY OF BOTH EYES WITH MACULAR EDEMA ASSOCIATED WITH DIABETES MELLITUS DUE TO UNDERLYING CONDITION: ICD-10-CM

## 2025-07-18 DIAGNOSIS — H35.053 RETINAL NEOVASCULARIZATION OF BOTH EYES: ICD-10-CM

## 2025-07-18 DIAGNOSIS — E11.65 TYPE 2 DIABETES MELLITUS WITH HYPERGLYCEMIA, WITH LONG-TERM CURRENT USE OF INSULIN: ICD-10-CM

## 2025-07-18 DIAGNOSIS — H35.09 RETINAL VASCULAR LESION: Primary | ICD-10-CM

## 2025-07-18 DIAGNOSIS — H34.9 RETINAL VASCULAR OCCLUSION: ICD-10-CM

## 2025-07-18 DIAGNOSIS — H04.123 DRY EYE SYNDROME OF BOTH EYES: ICD-10-CM

## 2025-07-18 DIAGNOSIS — Z79.4 TYPE 2 DIABETES MELLITUS WITH HYPERGLYCEMIA, WITH LONG-TERM CURRENT USE OF INSULIN: ICD-10-CM

## 2025-07-18 PROCEDURE — 92134 CPTRZ OPH DX IMG PST SGM RTA: CPT | Performed by: OPHTHALMOLOGY

## 2025-07-18 PROCEDURE — 99213 OFFICE O/P EST LOW 20 MIN: CPT | Performed by: OPHTHALMOLOGY

## 2025-07-18 ASSESSMENT — EXTERNAL EXAM - RIGHT EYE: OD_EXAM: NORMAL

## 2025-07-18 ASSESSMENT — CUP TO DISC RATIO
OS_RATIO: .3
OD_RATIO: .3

## 2025-07-18 ASSESSMENT — EXTERNAL EXAM - LEFT EYE: OS_EXAM: NORMAL

## 2025-07-18 ASSESSMENT — VISUAL ACUITY
METHOD: SNELLEN - LINEAR
OS_PH_CC+: -2
OD_CC: 20/25
OS_CC: 20/40
OD_CC+: -2
CORRECTION_TYPE: GLASSES
OS_PH_CC: 20/25

## 2025-07-18 ASSESSMENT — ENCOUNTER SYMPTOMS
EYES NEGATIVE: 0
ENDOCRINE COMMENTS: DM2
RESPIRATORY NEGATIVE: 0
MUSCULOSKELETAL NEGATIVE: 0
GASTROINTESTINAL NEGATIVE: 0
HEMATOLOGIC/LYMPHATIC COMMENTS: IMMUNOSUPRESSED
CARDIOVASCULAR NEGATIVE: 0
CONSTITUTIONAL NEGATIVE: 0
NEUROLOGICAL NEGATIVE: 0
ENDOCRINE NEGATIVE: 1
HEMATOLOGIC/LYMPHATIC NEGATIVE: 1
PSYCHIATRIC NEGATIVE: 0
ALLERGIC/IMMUNOLOGIC NEGATIVE: 0

## 2025-07-18 ASSESSMENT — TONOMETRY
OD_IOP_MMHG: 13
OS_IOP_MMHG: 13
IOP_METHOD: GOLDMANN APPLANATION

## 2025-07-18 ASSESSMENT — SLIT LAMP EXAM - LIDS
COMMENTS: GOOD POSITION
COMMENTS: GOOD POSITION

## 2025-07-18 NOTE — PROGRESS NOTES
Assessment/Plan   Diagnoses and all orders for this visit:  Retinal vascular lesion  -     OCT, Retina - OU - Both Eyes  Mild nonproliferative diabetic retinopathy of both eyes with macular edema associated with diabetes mellitus due to underlying condition  Dry eye syndrome of both eyes  Retinal neovascularization of both eyes  Retinal vascular occlusion  Type 2 diabetes mellitus with hyperglycemia, with long-term current use of insulin           TODAY   (OU)   - OCT Macula By:Nick Campoverde    History    Retinal vascular [id number] left macula has cme new    - Fibrovascular stalk extending into vitreous at 3:00 near equator in the left eye    - Possibly NVE but associated with additional tissue not typical of diabetic NVE or vitreous traction              Diabetes mellitus with both eyes affected by mild nonproliferative retinopathy without macular [id number]    - The patient has diabetes with evidence of retinopathy.      - The patient was advised to maintain tight glucose control, tight blood pressure control, and favorable levels of cholesterol, low density lipoprotein, and high density lipoproteins.           Combined form of age-related cataract, both [id number]                        Peripheral ischemia OS with NVE     schemia with vascular proliferation at 3 o clock. Per pt no hx of sickle cell or sickle cell trait in herself or family. hx DM but retinopathy overall mild.     Prior FA showed peripheral nonperufsion with vascular remodeling.       Sp Laser photocoagulation OU    Good laser uptake and coverage OU          She has had enough diabetic disease to warrant a renal transplant. In this situation, probably wise to consider diabetes itself as the  of the ischemia.             Consider FU DR membreno intraocular lens (IOL) or glasses left      6m

## 2025-07-21 ENCOUNTER — SPECIALTY PHARMACY (OUTPATIENT)
Dept: PHARMACY | Facility: CLINIC | Age: 75
End: 2025-07-21

## 2025-07-23 PROCEDURE — RXMED WILLOW AMBULATORY MEDICATION CHARGE

## 2025-07-24 ENCOUNTER — PHARMACY VISIT (OUTPATIENT)
Dept: PHARMACY | Facility: CLINIC | Age: 75
End: 2025-07-24
Payer: COMMERCIAL

## 2025-07-28 ENCOUNTER — OFFICE VISIT (OUTPATIENT)
Facility: HOSPITAL | Age: 75
End: 2025-07-28
Payer: COMMERCIAL

## 2025-07-28 ENCOUNTER — APPOINTMENT (OUTPATIENT)
Facility: HOSPITAL | Age: 75
End: 2025-07-28
Payer: COMMERCIAL

## 2025-07-28 VITALS
SYSTOLIC BLOOD PRESSURE: 145 MMHG | WEIGHT: 216.6 LBS | TEMPERATURE: 97.9 F | DIASTOLIC BLOOD PRESSURE: 67 MMHG | HEART RATE: 89 BPM | OXYGEN SATURATION: 96 % | BODY MASS INDEX: 36.04 KG/M2

## 2025-07-28 DIAGNOSIS — E21.3 HYPERPARATHYROIDISM (MULTI): ICD-10-CM

## 2025-07-28 DIAGNOSIS — R79.0 LOW MAGNESIUM LEVEL: ICD-10-CM

## 2025-07-28 DIAGNOSIS — Z94.0 KIDNEY REPLACED BY TRANSPLANT (HHS-HCC): ICD-10-CM

## 2025-07-28 LAB
25(OH)D3 SERPL-MCNC: 51 NG/ML (ref 30–100)
ALBUMIN SERPL BCP-MCNC: 4.2 G/DL (ref 3.4–5)
ANION GAP SERPL CALC-SCNC: 14 MMOL/L (ref 10–20)
BUN SERPL-MCNC: 22 MG/DL (ref 6–23)
CALCIUM SERPL-MCNC: 10.2 MG/DL (ref 8.6–10.6)
CHLORIDE SERPL-SCNC: 107 MMOL/L (ref 98–107)
CO2 SERPL-SCNC: 26 MMOL/L (ref 21–32)
CREAT SERPL-MCNC: 1.01 MG/DL (ref 0.5–1.05)
CREAT UR-MCNC: 123.5 MG/DL (ref 20–320)
EGFRCR SERPLBLD CKD-EPI 2021: 59 ML/MIN/1.73M*2
ERYTHROCYTE [DISTWIDTH] IN BLOOD BY AUTOMATED COUNT: 13.7 % (ref 11.5–14.5)
GLUCOSE SERPL-MCNC: 117 MG/DL (ref 74–99)
HCT VFR BLD AUTO: 45.7 % (ref 36–46)
HGB BLD-MCNC: 13.6 G/DL (ref 12–16)
MAGNESIUM SERPL-MCNC: 1.83 MG/DL (ref 1.6–2.4)
MCH RBC QN AUTO: 27.3 PG (ref 26–34)
MCHC RBC AUTO-ENTMCNC: 29.8 G/DL (ref 32–36)
MCV RBC AUTO: 92 FL (ref 80–100)
NRBC BLD-RTO: 0 /100 WBCS (ref 0–0)
PHOSPHATE SERPL-MCNC: 4 MG/DL (ref 2.5–4.9)
PLATELET # BLD AUTO: 240 X10*3/UL (ref 150–450)
POTASSIUM SERPL-SCNC: 5.2 MMOL/L (ref 3.5–5.3)
PROT UR-ACNC: 13 MG/DL (ref 5–24)
PROT/CREAT UR: 0.11 MG/MG CREAT (ref 0–0.17)
PTH-INTACT SERPL-MCNC: 182.7 PG/ML (ref 18.5–88)
RBC # BLD AUTO: 4.98 X10*6/UL (ref 4–5.2)
SODIUM SERPL-SCNC: 142 MMOL/L (ref 136–145)
TACROLIMUS BLD-MCNC: 5.9 NG/ML
WBC # BLD AUTO: 5.8 X10*3/UL (ref 4.4–11.3)

## 2025-07-28 PROCEDURE — 80197 ASSAY OF TACROLIMUS: CPT | Performed by: HOSPITALIST

## 2025-07-28 PROCEDURE — 82306 VITAMIN D 25 HYDROXY: CPT | Performed by: HOSPITALIST

## 2025-07-28 PROCEDURE — 85027 COMPLETE CBC AUTOMATED: CPT | Performed by: HOSPITALIST

## 2025-07-28 PROCEDURE — 87799 DETECT AGENT NOS DNA QUANT: CPT | Performed by: HOSPITALIST

## 2025-07-28 PROCEDURE — 99214 OFFICE O/P EST MOD 30 MIN: CPT | Performed by: HOSPITALIST

## 2025-07-28 PROCEDURE — 84100 ASSAY OF PHOSPHORUS: CPT | Performed by: HOSPITALIST

## 2025-07-28 PROCEDURE — 3078F DIAST BP <80 MM HG: CPT | Performed by: HOSPITALIST

## 2025-07-28 PROCEDURE — 82570 ASSAY OF URINE CREATININE: CPT | Performed by: HOSPITALIST

## 2025-07-28 PROCEDURE — 4010F ACE/ARB THERAPY RXD/TAKEN: CPT | Performed by: HOSPITALIST

## 2025-07-28 PROCEDURE — 83735 ASSAY OF MAGNESIUM: CPT | Performed by: HOSPITALIST

## 2025-07-28 PROCEDURE — 3077F SYST BP >= 140 MM HG: CPT | Performed by: HOSPITALIST

## 2025-07-28 PROCEDURE — 36415 COLL VENOUS BLD VENIPUNCTURE: CPT | Performed by: HOSPITALIST

## 2025-07-28 PROCEDURE — 1126F AMNT PAIN NOTED NONE PRSNT: CPT | Performed by: HOSPITALIST

## 2025-07-28 PROCEDURE — 83970 ASSAY OF PARATHORMONE: CPT | Performed by: HOSPITALIST

## 2025-07-28 RX ORDER — LANOLIN ALCOHOL/MO/W.PET/CERES
1 CREAM (GRAM) TOPICAL DAILY
Qty: 90 TABLET | Refills: 3 | Status: SHIPPED | OUTPATIENT
Start: 2025-07-28 | End: 2026-07-28

## 2025-07-28 ASSESSMENT — ENCOUNTER SYMPTOMS
CHEST TIGHTNESS: 0
HEMATURIA: 0
SHORTNESS OF BREATH: 0
HEMATOLOGIC/LYMPHATIC NEGATIVE: 1
CONSTIPATION: 0
FLANK PAIN: 0
BLOOD IN STOOL: 0
COUGH: 0
POLYPHAGIA: 0
ABDOMINAL DISTENTION: 0
POLYDIPSIA: 0
CARDIOVASCULAR NEGATIVE: 1
NEUROLOGICAL NEGATIVE: 1
DYSURIA: 0
RESPIRATORY NEGATIVE: 1
PSYCHIATRIC NEGATIVE: 1

## 2025-07-28 ASSESSMENT — PAIN SCALES - GENERAL: PAINLEVEL_OUTOF10: 0-NO PAIN

## 2025-07-28 NOTE — PROGRESS NOTES
Jacklyn Villa   75 y.o. female with past medical history significant for ESKD secondary to diabetes mellitus and hypertension who is s/p DDKT on 2022 (Dr. Rivera, KDPI 78%, PRA 96%. HCV -/ R -, HBcAb D+/R HBsAb+ (on Vemlidy, never seroconverted), CMV D+/R+, EBV D+ / R-). Left donor kidney, transplanted to patient right pelvis. H/o DGF post operatively needing dialysis once due to hyperkalemia with eventual renal recovery. Other PMH: hyperlipidemia, diverticulosis   -Post-txp course notable for CMV viremia.  With eventual resolution.   -Hospitalization as of 2024 for uncontrolled diabetes with mild DKA and UTI, [facility].    Interim history: Denied any complaints.  Unable to get complete lab sets because of some  orders planning to get today blood pressures are in the range of 1 40-1 20/80.    Review of Systems   HENT: Negative.     Respiratory: Negative.  Negative for cough, chest tightness and shortness of breath.    Cardiovascular: Negative.  Negative for leg swelling.   Gastrointestinal:  Negative for abdominal distention, blood in stool and constipation.   Endocrine: Negative for polydipsia, polyphagia and polyuria.   Genitourinary:  Negative for decreased urine volume, dysuria, flank pain, hematuria and urgency.   Neurological: Negative.    Hematological: Negative.    Psychiatric/Behavioral: Negative.          Objective:  Visit Vitals  /67   Pulse 89   Temp 36.6 °C (97.9 °F) (Temporal)   Wt 98.2 kg (216 lb 9.6 oz)   SpO2 96%   BMI 36.04 kg/m²   OB Status Postmenopausal   Smoking Status Former   BSA 2.12 m²      Physical Exam  HENT:      Head: Normocephalic.     Eyes:      Pupils: Pupils are equal, round, and reactive to light.       Cardiovascular:      Rate and Rhythm: Normal rate and regular rhythm.   Pulmonary:      Effort: Pulmonary effort is normal. No respiratory distress.      Breath sounds: No wheezing or rales.   Abdominal:      General: There is no distension.      Tenderness: There is  no abdominal tenderness. There is no rebound.     Musculoskeletal:         General: Normal range of motion.     Skin:     General: Skin is warm.      Findings: No bruising, lesion or rash.     Neurological:      General: No focal deficit present.     Psychiatric:         Mood and Affect: Mood normal.          Current Medications[1]     [unfilled]     No images are attached to the encounter.     Assessment and Plan :Jacklyn Villa 75 y.o. female with past medical history significant for ESKD secondary to diabetes mellitus and hypertension who is s/p DDKT on 2022 (Dr. Rivera, KDPI 78%, PRA 96%. HCV -/ R -, HBcAb D+/R HBsAb+ (on Vemlidy, never seroconverted), CMV D+/R+, EBV D+ / R-). Left donor kidney, transplanted to patient right pelvis. H/o DGF post operatively needing dialysis once due to hyperkalemia with eventual renal recovery. Other PMH: hyperlipidemia, diverticulosis   -Post-txp course notable for CMV viremia.  With eventual resolution.   -Hospitalization as of 2024 for uncontrolled diabetes with mild DKA and UTI, [facility].    Interim history: Denied any complaints.  Unable to get complete lab sets because of some  orders planning to get today blood pressures are in the range of 1 40-1 20/80.    Allograft function: Stable creatinine in the range of 0.8-0.9 but labs are from April we will follow-up with repeat.  Last UPC less than 4.  AlloSure is 0.10.  No DSA as of 3/31/2025.  Infection-Workup BK detected around 30  of 2025, CMV and EBV not detected as of 2025  Monitor BK every other month.    Immunosuppression: Recent tacrolimus levels are around 5 aim levels of 5-8 continue with Envarsus 3 mg daily, mycophenolate 500 twice daily, prednisone 5 mg daily    Hemodynamics: Blood pressure is optimally controlled continue with amlodipine 10, carvedilol 50 twice daily, losartan 50 twice daily.    No anemia or leukopenia.    Bone mineral disease: Calcium and phosphorus levels are  optimal continue with the current management.      General health care: Recommended age-appropriate screening, COVID shots annual dermatology visits,DEXA scan 2-3 yrs while on steroids .    Labs every other month and follow-up in clinic in 6 months    Martin Yee MD    Notes created by Artemio -Please excuse the Typos .         [1]   Current Outpatient Medications:     amLODIPine (Norvasc) 10 mg tablet, Take 1 tablet (10 mg) by mouth once daily., Disp: 90 tablet, Rfl: 3    aspirin 325 mg EC tablet, Take 1 tablet (325 mg) by mouth once daily., Disp: 90 tablet, Rfl: 3    carvedilol (Coreg) 25 mg tablet, Take 2 tablets (50 mg) by mouth 2 times a day., Disp: 360 tablet, Rfl: 3    dapagliflozin propanediol (Farxiga) 10 mg tablet, Take one tablet by mouth once daily, Disp: 90 tablet, Rfl: 2    losartan (Cozaar) 25 mg tablet, Take 2 tablets (50 mg) by mouth 2 times a day., Disp: 360 tablet, Rfl: 3    lovastatin (Mevacor) 40 mg tablet, Take 1 tablet (40 mg) by mouth once daily at bedtime., Disp: 90 tablet, Rfl: 3    magnesium oxide (Mag-Ox) 400 mg (241.3 mg elemental) tablet, Take 1 tablet by mouth once daily., Disp: 90 tablet, Rfl: 3    mycophenolate (Cellcept) 500 mg tablet, Take 1 tablet (500 mg) by mouth 2 times a day., Disp: 180 tablet, Rfl: 3    predniSONE (Deltasone) 5 mg tablet, TAKE ONE (1) TABLET BY MOUTH ONCE DAILY., Disp: 90 tablet, Rfl: 3    semaglutide (Ozempic) 2 mg/dose (8 mg/3 mL) pen injector, Inject 2 mg under the skin 1 (one) time per week., Disp: 9 mL, Rfl: 3    tacrolimus ER (Envarsus XR) 1 mg tablet ER, Take 3 tablets (3 mg) by mouth once daily., Disp: 90 tablet, Rfl: 11    clindamycin (Cleocin T) 1 % lotion, Clindamycin Phosphate 1 % External Lotion apply topically to affected areas of the face twice daily Quantity: 60 Refills: 0 Ordered: 22-Feb-2023 DO Start : 22-Feb-2023 Active, Disp: , Rfl:     Dexcom G7 Sensor device, Change sensor every 10 days, Disp: 9 each, Rfl: 3    insulin lispro  "(HumaLOG) 100 unit/mL pen, Siding scale as needed with meals for sugars >200, up to 30 units daily, Disp: 15 mL, Rfl: 11    insulin NPH, Isophane, (HumuLIN N,NovoLIN N) 100 unit/mL (3 mL) pen, 30 units with your morning dose of prednisone, Disp: 30 mL, Rfl: 6    ketoconazole (NIZOral) 2 % shampoo, , Disp: , Rfl:     pen needle, diabetic (BD Kristen 2nd Gen Pen Needle) 32 gauge x 5/32\" needle, USE AS DIRECTED TO INJECT INSULIN 4 TIMES A DAY, Disp: 400 each, Rfl: 1    "

## 2025-07-29 LAB
BKV DNA SERPL NAA+PROBE-LOG#: NORMAL {LOG_COPIES}/ML
LABORATORY COMMENT REPORT: NOT DETECTED

## 2025-08-06 ENCOUNTER — SPECIALTY PHARMACY (OUTPATIENT)
Dept: PHARMACY | Facility: CLINIC | Age: 75
End: 2025-08-06

## 2025-08-06 PROCEDURE — RXMED WILLOW AMBULATORY MEDICATION CHARGE

## 2025-08-11 ENCOUNTER — PHARMACY VISIT (OUTPATIENT)
Dept: PHARMACY | Facility: CLINIC | Age: 75
End: 2025-08-11
Payer: COMMERCIAL

## 2025-08-22 ENCOUNTER — SPECIALTY PHARMACY (OUTPATIENT)
Dept: PHARMACY | Facility: CLINIC | Age: 75
End: 2025-08-22

## 2025-08-22 DIAGNOSIS — Z94.0 KIDNEY REPLACED BY TRANSPLANT (HHS-HCC): ICD-10-CM

## 2025-08-22 PROCEDURE — RXMED WILLOW AMBULATORY MEDICATION CHARGE

## 2025-08-25 ENCOUNTER — PHARMACY VISIT (OUTPATIENT)
Dept: PHARMACY | Facility: CLINIC | Age: 75
End: 2025-08-25
Payer: COMMERCIAL

## 2025-08-27 ENCOUNTER — OFFICE VISIT (OUTPATIENT)
Facility: HOSPITAL | Age: 75
End: 2025-08-27
Payer: COMMERCIAL

## 2025-08-27 VITALS
DIASTOLIC BLOOD PRESSURE: 71 MMHG | BODY MASS INDEX: 36.15 KG/M2 | TEMPERATURE: 97 F | SYSTOLIC BLOOD PRESSURE: 119 MMHG | OXYGEN SATURATION: 98 % | HEIGHT: 65 IN | WEIGHT: 217 LBS | HEART RATE: 81 BPM

## 2025-08-27 DIAGNOSIS — Z13.820 SCREENING FOR OSTEOPOROSIS: ICD-10-CM

## 2025-08-27 DIAGNOSIS — Z09 ENCOUNTER FOR FOLLOW-UP EXAMINATION AFTER COMPLETED TREATMENT FOR CONDITIONS OTHER THAN MALIGNANT NEOPLASM: ICD-10-CM

## 2025-08-27 DIAGNOSIS — Z87.891 STOPPED SMOKING WITH GREATER THAN 25 PACK YEAR HISTORY: ICD-10-CM

## 2025-08-27 DIAGNOSIS — Z87.891 HISTORY OF SMOKING 25-50 PACK YEARS: ICD-10-CM

## 2025-08-27 DIAGNOSIS — F17.211 CIGARETTE NICOTINE DEPENDENCE IN REMISSION: Primary | ICD-10-CM

## 2025-08-27 DIAGNOSIS — Z91.81 HISTORY OF BAD FALL: ICD-10-CM

## 2025-08-27 DIAGNOSIS — E66.01 CLASS 2 SEVERE OBESITY WITH SERIOUS COMORBIDITY AND BODY MASS INDEX (BMI) OF 36.0 TO 36.9 IN ADULT, UNSPECIFIED OBESITY TYPE: ICD-10-CM

## 2025-08-27 DIAGNOSIS — F17.211 CIGARETTE NICOTINE DEPENDENCE IN REMISSION: ICD-10-CM

## 2025-08-27 DIAGNOSIS — J96.01 ACUTE RESPIRATORY FAILURE WITH HYPOXIA: ICD-10-CM

## 2025-08-27 DIAGNOSIS — Z12.31 ENCOUNTER FOR SCREENING MAMMOGRAM FOR MALIGNANT NEOPLASM OF BREAST: ICD-10-CM

## 2025-08-27 DIAGNOSIS — I11.0 HYPERTENSIVE HEART DISEASE WITH HEART FAILURE: ICD-10-CM

## 2025-08-27 DIAGNOSIS — Z79.4 CONTROLLED TYPE 2 DIABETES MELLITUS WITH DIABETIC NEUROPATHY, WITH LONG-TERM CURRENT USE OF INSULIN: ICD-10-CM

## 2025-08-27 DIAGNOSIS — I10 ESSENTIAL HYPERTENSION WITH GOAL BLOOD PRESSURE LESS THAN 130/80: ICD-10-CM

## 2025-08-27 DIAGNOSIS — E11.40 CONTROLLED TYPE 2 DIABETES MELLITUS WITH DIABETIC NEUROPATHY, WITH LONG-TERM CURRENT USE OF INSULIN: ICD-10-CM

## 2025-08-27 DIAGNOSIS — E66.812 CLASS 2 SEVERE OBESITY WITH SERIOUS COMORBIDITY AND BODY MASS INDEX (BMI) OF 36.0 TO 36.9 IN ADULT, UNSPECIFIED OBESITY TYPE: ICD-10-CM

## 2025-08-27 PROBLEM — N18.5 CKD (CHRONIC KIDNEY DISEASE), STAGE V (MULTI): Status: RESOLVED | Noted: 2023-04-02 | Resolved: 2025-08-27

## 2025-08-27 PROCEDURE — 1160F RVW MEDS BY RX/DR IN RCRD: CPT | Performed by: FAMILY MEDICINE

## 2025-08-27 PROCEDURE — 3074F SYST BP LT 130 MM HG: CPT | Performed by: FAMILY MEDICINE

## 2025-08-27 PROCEDURE — 99212 OFFICE O/P EST SF 10 MIN: CPT

## 2025-08-27 PROCEDURE — 3051F HG A1C>EQUAL 7.0%<8.0%: CPT | Performed by: FAMILY MEDICINE

## 2025-08-27 PROCEDURE — 1159F MED LIST DOCD IN RCRD: CPT | Performed by: FAMILY MEDICINE

## 2025-08-27 PROCEDURE — 3078F DIAST BP <80 MM HG: CPT | Performed by: FAMILY MEDICINE

## 2025-08-27 PROCEDURE — 1125F AMNT PAIN NOTED PAIN PRSNT: CPT | Performed by: FAMILY MEDICINE

## 2025-08-27 PROCEDURE — 99214 OFFICE O/P EST MOD 30 MIN: CPT | Performed by: FAMILY MEDICINE

## 2025-08-27 PROCEDURE — 1036F TOBACCO NON-USER: CPT | Performed by: FAMILY MEDICINE

## 2025-08-27 PROCEDURE — 3061F NEG MICROALBUMINURIA REV: CPT | Performed by: FAMILY MEDICINE

## 2025-08-27 PROCEDURE — 3008F BODY MASS INDEX DOCD: CPT | Performed by: FAMILY MEDICINE

## 2025-08-27 PROCEDURE — 4010F ACE/ARB THERAPY RXD/TAKEN: CPT | Performed by: FAMILY MEDICINE

## 2025-08-27 ASSESSMENT — ENCOUNTER SYMPTOMS
LOSS OF SENSATION IN FEET: 0
SHORTNESS OF BREATH: 0
OCCASIONAL FEELINGS OF UNSTEADINESS: 0
DEPRESSION: 0

## 2025-08-27 ASSESSMENT — PATIENT HEALTH QUESTIONNAIRE - PHQ9
SUM OF ALL RESPONSES TO PHQ9 QUESTIONS 1 AND 2: 0
2. FEELING DOWN, DEPRESSED OR HOPELESS: NOT AT ALL
1. LITTLE INTEREST OR PLEASURE IN DOING THINGS: NOT AT ALL

## 2025-08-27 ASSESSMENT — PAIN SCALES - GENERAL: PAINLEVEL_OUTOF10: 10-WORST PAIN EVER

## 2025-12-03 ENCOUNTER — APPOINTMENT (OUTPATIENT)
Dept: ENDOCRINOLOGY | Facility: CLINIC | Age: 75
End: 2025-12-03
Payer: COMMERCIAL

## 2025-12-03 ENCOUNTER — APPOINTMENT (OUTPATIENT)
Facility: HOSPITAL | Age: 75
End: 2025-12-03
Payer: COMMERCIAL

## 2026-01-23 ENCOUNTER — APPOINTMENT (OUTPATIENT)
Dept: OPHTHALMOLOGY | Facility: CLINIC | Age: 76
End: 2026-01-23
Payer: COMMERCIAL

## 2026-01-28 ENCOUNTER — APPOINTMENT (OUTPATIENT)
Facility: HOSPITAL | Age: 76
End: 2026-01-28
Payer: COMMERCIAL

## 2026-05-01 ENCOUNTER — APPOINTMENT (OUTPATIENT)
Dept: OPHTHALMOLOGY | Facility: CLINIC | Age: 76
End: 2026-05-01
Payer: COMMERCIAL